# Patient Record
Sex: FEMALE | Race: WHITE | NOT HISPANIC OR LATINO | URBAN - METROPOLITAN AREA
[De-identification: names, ages, dates, MRNs, and addresses within clinical notes are randomized per-mention and may not be internally consistent; named-entity substitution may affect disease eponyms.]

---

## 2017-06-15 ENCOUNTER — INPATIENT HOSPITAL (AMBULATORY)
Dept: URBAN - METROPOLITAN AREA HOSPITAL 76 | Facility: HOSPITAL | Age: 47
End: 2017-06-15
Payer: OTHER GOVERNMENT

## 2017-06-15 DIAGNOSIS — K31.7 POLYP OF STOMACH AND DUODENUM: ICD-10-CM

## 2017-06-15 DIAGNOSIS — K62.5 HEMORRHAGE OF ANUS AND RECTUM: ICD-10-CM

## 2017-06-15 DIAGNOSIS — K64.0 FIRST DEGREE HEMORRHOIDS: ICD-10-CM

## 2017-06-15 DIAGNOSIS — R11.2 NAUSEA WITH VOMITING, UNSPECIFIED: ICD-10-CM

## 2017-06-15 DIAGNOSIS — R19.7 DIARRHEA, UNSPECIFIED: ICD-10-CM

## 2017-06-15 DIAGNOSIS — D12.2 BENIGN NEOPLASM OF ASCENDING COLON: ICD-10-CM

## 2017-06-15 DIAGNOSIS — K29.60 OTHER GASTRITIS WITHOUT BLEEDING: ICD-10-CM

## 2017-06-15 DIAGNOSIS — R10.31 RIGHT LOWER QUADRANT PAIN: ICD-10-CM

## 2017-06-15 PROCEDURE — 45380 COLONOSCOPY AND BIOPSY: CPT | Performed by: INTERNAL MEDICINE

## 2017-06-15 PROCEDURE — 43239 EGD BIOPSY SINGLE/MULTIPLE: CPT | Performed by: INTERNAL MEDICINE

## 2017-12-07 ENCOUNTER — HOSPITAL ENCOUNTER (OUTPATIENT)
Dept: OTHER | Facility: HOSPITAL | Age: 47
Discharge: HOME | End: 2017-12-07
Attending: ORTHOPAEDIC SURGERY | Admitting: ORTHOPAEDIC SURGERY

## 2017-12-07 LAB
ANION GAP SERPL CALC-SCNC: 15 MMOL/L (ref 10–20)
BASOPHILS # BLD AUTO: 0.1 10*3/UL (ref 0–0.2)
BASOPHILS NFR BLD AUTO: 1 % (ref 0–2)
BUN SERPL-MCNC: 31 MG/DL (ref 8–20)
BUN/CREAT SERPL: 15.5 (ref 5.4–26.2)
CALCIUM SERPL-MCNC: 9.9 MG/DL (ref 8.9–10.3)
CHLORIDE SERPL-SCNC: 109 MMOL/L (ref 101–111)
CONV CO2: 18 MMOL/L (ref 22–32)
CREAT UR-MCNC: 2 MG/DL (ref 0.4–1)
DIFFERENTIAL METHOD BLD: (no result)
EOSINOPHIL # BLD AUTO: 0.4 10*3/UL (ref 0–0.3)
EOSINOPHIL # BLD AUTO: 5 % (ref 0–3)
ERYTHROCYTE [DISTWIDTH] IN BLOOD BY AUTOMATED COUNT: 15.4 % (ref 11.5–14.5)
GLUCOSE SERPL-MCNC: 185 MG/DL (ref 65–99)
HCT VFR BLD AUTO: 37.6 % (ref 35–49)
HGB BLD-MCNC: 12.4 G/DL (ref 12–15)
LYMPHOCYTES # BLD AUTO: 2.6 10*3/UL (ref 0.8–4.8)
LYMPHOCYTES NFR BLD AUTO: 30 % (ref 18–42)
MCH RBC QN AUTO: 28.8 PG (ref 26–32)
MCHC RBC AUTO-ENTMCNC: 33 G/DL (ref 32–36)
MCV RBC AUTO: 87.5 FL (ref 80–94)
MONOCYTES # BLD AUTO: 0.7 10*3/UL (ref 0.1–1.3)
MONOCYTES NFR BLD AUTO: 8 % (ref 2–11)
NEUTROPHILS # BLD AUTO: 4.9 10*3/UL (ref 2.3–8.6)
NEUTROPHILS NFR BLD AUTO: 56 % (ref 50–75)
NRBC BLD AUTO-RTO: 0 /100{WBCS}
NRBC/RBC NFR BLD MANUAL: 0 10*3/UL
PLATELET # BLD AUTO: 151 10*3/UL (ref 150–450)
PMV BLD AUTO: 7.7 FL (ref 7.4–10.4)
POTASSIUM SERPL-SCNC: 5 MMOL/L (ref 3.6–5.1)
RBC # BLD AUTO: 4.3 10*6/UL (ref 4–5.4)
SODIUM SERPL-SCNC: 137 MMOL/L (ref 136–144)
WBC # BLD AUTO: 8.6 10*3/UL (ref 4.5–11.5)

## 2017-12-13 ENCOUNTER — OFFICE (AMBULATORY)
Dept: URBAN - METROPOLITAN AREA CLINIC 64 | Facility: CLINIC | Age: 47
End: 2017-12-13
Payer: OTHER GOVERNMENT

## 2017-12-13 VITALS
WEIGHT: 238 LBS | SYSTOLIC BLOOD PRESSURE: 109 MMHG | HEART RATE: 117 BPM | HEIGHT: 62 IN | DIASTOLIC BLOOD PRESSURE: 62 MMHG

## 2017-12-13 DIAGNOSIS — R10.84 GENERALIZED ABDOMINAL PAIN: ICD-10-CM

## 2017-12-13 DIAGNOSIS — K58.0 IRRITABLE BOWEL SYNDROME WITH DIARRHEA: ICD-10-CM

## 2017-12-13 PROCEDURE — 99214 OFFICE O/P EST MOD 30 MIN: CPT | Performed by: INTERNAL MEDICINE

## 2017-12-13 RX ORDER — COLESTIPOL HYDROCHLORIDE 1 G/1
TABLET, FILM COATED ORAL
Qty: 60 | Refills: 11 | Status: COMPLETED
Start: 2017-12-13 | End: 2019-04-23

## 2017-12-13 RX ORDER — DICYCLOMINE HYDROCHLORIDE 20 MG/1
TABLET ORAL
Qty: 120 | Refills: 12 | Status: COMPLETED
Start: 2017-12-13 | End: 2019-04-23

## 2017-12-29 ENCOUNTER — HOSPITAL ENCOUNTER (OUTPATIENT)
Dept: CARDIOLOGY | Facility: HOSPITAL | Age: 47
Discharge: HOME | End: 2017-12-29
Attending: ORTHOPAEDIC SURGERY | Admitting: ORTHOPAEDIC SURGERY

## 2018-04-06 ENCOUNTER — OFFICE (AMBULATORY)
Dept: URBAN - METROPOLITAN AREA CLINIC 64 | Facility: CLINIC | Age: 48
End: 2018-04-06
Payer: COMMERCIAL

## 2018-04-06 VITALS
SYSTOLIC BLOOD PRESSURE: 123 MMHG | WEIGHT: 228 LBS | HEART RATE: 90 BPM | DIASTOLIC BLOOD PRESSURE: 71 MMHG | HEIGHT: 62 IN

## 2018-04-06 DIAGNOSIS — K58.0 IRRITABLE BOWEL SYNDROME WITH DIARRHEA: ICD-10-CM

## 2018-04-06 DIAGNOSIS — R11.2 NAUSEA WITH VOMITING, UNSPECIFIED: ICD-10-CM

## 2018-04-06 PROCEDURE — 99212 OFFICE O/P EST SF 10 MIN: CPT | Performed by: INTERNAL MEDICINE

## 2018-11-26 ENCOUNTER — HOSPITAL ENCOUNTER (OUTPATIENT)
Dept: CARDIOLOGY | Facility: HOSPITAL | Age: 48
Discharge: HOME | End: 2018-11-26
Attending: INTERNAL MEDICINE | Admitting: INTERNAL MEDICINE

## 2018-12-11 ENCOUNTER — HOSPITAL ENCOUNTER (OUTPATIENT)
Dept: PREADMISSION TESTING | Facility: HOSPITAL | Age: 48
Discharge: HOME | End: 2018-12-11
Attending: INTERNAL MEDICINE | Admitting: INTERNAL MEDICINE

## 2018-12-11 LAB
ANION GAP SERPL CALC-SCNC: 13.7 MMOL/L (ref 10–20)
BASOPHILS # BLD AUTO: 0 10*3/UL (ref 0–0.2)
BASOPHILS NFR BLD AUTO: 1 % (ref 0–2)
BUN SERPL-MCNC: 20 MG/DL (ref 8–20)
BUN/CREAT SERPL: 13.3 (ref 5.4–26.2)
CALCIUM SERPL-MCNC: 10 MG/DL (ref 8.9–10.3)
CHLORIDE SERPL-SCNC: 104 MMOL/L (ref 101–111)
CONV CO2: 24 MMOL/L (ref 22–32)
CREAT UR-MCNC: 1.5 MG/DL (ref 0.4–1)
DIFFERENTIAL METHOD BLD: (no result)
EOSINOPHIL # BLD AUTO: 0.1 10*3/UL (ref 0–0.3)
EOSINOPHIL # BLD AUTO: 2 % (ref 0–3)
ERYTHROCYTE [DISTWIDTH] IN BLOOD BY AUTOMATED COUNT: 14.4 % (ref 11.5–14.5)
GLUCOSE SERPL-MCNC: 274 MG/DL (ref 65–99)
HCT VFR BLD AUTO: 39.3 % (ref 35–49)
HGB BLD-MCNC: 12.9 G/DL (ref 12–15)
INR PPP: 1
LYMPHOCYTES # BLD AUTO: 2.2 10*3/UL (ref 0.8–4.8)
LYMPHOCYTES NFR BLD AUTO: 34 % (ref 18–42)
MCH RBC QN AUTO: 27.5 PG (ref 26–32)
MCHC RBC AUTO-ENTMCNC: 32.9 G/DL (ref 32–36)
MCV RBC AUTO: 83.7 FL (ref 80–94)
MONOCYTES # BLD AUTO: 0.5 10*3/UL (ref 0.1–1.3)
MONOCYTES NFR BLD AUTO: 7 % (ref 2–11)
NEUTROPHILS # BLD AUTO: 3.6 10*3/UL (ref 2.3–8.6)
NEUTROPHILS NFR BLD AUTO: 56 % (ref 50–75)
NRBC BLD AUTO-RTO: 0 /100{WBCS}
NRBC/RBC NFR BLD MANUAL: 0 10*3/UL
PLATELET # BLD AUTO: 139 10*3/UL (ref 150–450)
PMV BLD AUTO: 7.7 FL (ref 7.4–10.4)
POTASSIUM SERPL-SCNC: 4.7 MMOL/L (ref 3.6–5.1)
PROTHROMBIN TIME: 9.9 SEC (ref 9.6–11.7)
RBC # BLD AUTO: 4.69 10*6/UL (ref 4–5.4)
SODIUM SERPL-SCNC: 137 MMOL/L (ref 136–144)
WBC # BLD AUTO: 6.5 10*3/UL (ref 4.5–11.5)

## 2019-04-23 ENCOUNTER — OFFICE (AMBULATORY)
Dept: URBAN - METROPOLITAN AREA CLINIC 64 | Facility: CLINIC | Age: 49
End: 2019-04-23
Payer: COMMERCIAL

## 2019-04-23 VITALS
WEIGHT: 233 LBS | DIASTOLIC BLOOD PRESSURE: 71 MMHG | HEART RATE: 81 BPM | HEIGHT: 62 IN | SYSTOLIC BLOOD PRESSURE: 135 MMHG

## 2019-04-23 DIAGNOSIS — E66.9 OBESITY, UNSPECIFIED: ICD-10-CM

## 2019-04-23 DIAGNOSIS — R07.89 OTHER CHEST PAIN: ICD-10-CM

## 2019-04-23 DIAGNOSIS — R13.10 DYSPHAGIA, UNSPECIFIED: ICD-10-CM

## 2019-04-23 PROCEDURE — 99214 OFFICE O/P EST MOD 30 MIN: CPT | Performed by: INTERNAL MEDICINE

## 2019-05-06 ENCOUNTER — OFFICE (AMBULATORY)
Dept: URBAN - METROPOLITAN AREA CLINIC 64 | Facility: CLINIC | Age: 49
End: 2019-05-06

## 2019-05-15 ENCOUNTER — HOSPITAL ENCOUNTER (OUTPATIENT)
Dept: CARDIOLOGY | Facility: HOSPITAL | Age: 49
Discharge: HOME | End: 2019-05-15
Attending: INTERNAL MEDICINE | Admitting: INTERNAL MEDICINE

## 2019-05-24 ENCOUNTER — OFFICE (AMBULATORY)
Dept: URBAN - METROPOLITAN AREA PATHOLOGY 4 | Facility: PATHOLOGY | Age: 49
End: 2019-05-24
Payer: COMMERCIAL

## 2019-05-24 ENCOUNTER — ON CAMPUS - OUTPATIENT (AMBULATORY)
Dept: URBAN - METROPOLITAN AREA HOSPITAL 2 | Facility: HOSPITAL | Age: 49
End: 2019-05-24
Payer: COMMERCIAL

## 2019-05-24 VITALS
OXYGEN SATURATION: 90 % | DIASTOLIC BLOOD PRESSURE: 59 MMHG | OXYGEN SATURATION: 97 % | SYSTOLIC BLOOD PRESSURE: 106 MMHG | HEART RATE: 87 BPM | HEART RATE: 77 BPM | RESPIRATION RATE: 20 BRPM | SYSTOLIC BLOOD PRESSURE: 116 MMHG | DIASTOLIC BLOOD PRESSURE: 61 MMHG | HEART RATE: 85 BPM | DIASTOLIC BLOOD PRESSURE: 44 MMHG | SYSTOLIC BLOOD PRESSURE: 122 MMHG | RESPIRATION RATE: 21 BRPM | DIASTOLIC BLOOD PRESSURE: 69 MMHG | DIASTOLIC BLOOD PRESSURE: 68 MMHG | DIASTOLIC BLOOD PRESSURE: 78 MMHG | SYSTOLIC BLOOD PRESSURE: 109 MMHG | HEIGHT: 62 IN | SYSTOLIC BLOOD PRESSURE: 119 MMHG | OXYGEN SATURATION: 93 % | RESPIRATION RATE: 17 BRPM | RESPIRATION RATE: 18 BRPM | RESPIRATION RATE: 16 BRPM | DIASTOLIC BLOOD PRESSURE: 67 MMHG | SYSTOLIC BLOOD PRESSURE: 112 MMHG | HEART RATE: 86 BPM | WEIGHT: 234 LBS | SYSTOLIC BLOOD PRESSURE: 90 MMHG | HEART RATE: 80 BPM | OXYGEN SATURATION: 95 % | HEART RATE: 79 BPM | TEMPERATURE: 97.7 F | HEART RATE: 98 BPM | HEART RATE: 97 BPM | OXYGEN SATURATION: 96 %

## 2019-05-24 DIAGNOSIS — R13.10 DYSPHAGIA, UNSPECIFIED: ICD-10-CM

## 2019-05-24 DIAGNOSIS — K31.7 POLYP OF STOMACH AND DUODENUM: ICD-10-CM

## 2019-05-24 DIAGNOSIS — T18.2XXA FOREIGN BODY IN STOMACH, INITIAL ENCOUNTER: ICD-10-CM

## 2019-05-24 DIAGNOSIS — R07.89 OTHER CHEST PAIN: ICD-10-CM

## 2019-05-24 LAB
GI HISTOLOGY: A. ANTRUM: (no result)
GI HISTOLOGY: PDF REPORT: (no result)

## 2019-05-24 PROCEDURE — 88305 TISSUE EXAM BY PATHOLOGIST: CPT | Performed by: INTERNAL MEDICINE

## 2019-05-24 PROCEDURE — 43450 DILATE ESOPHAGUS 1/MULT PASS: CPT | Performed by: INTERNAL MEDICINE

## 2019-05-24 PROCEDURE — 43251 EGD REMOVE LESION SNARE: CPT | Performed by: INTERNAL MEDICINE

## 2019-10-21 RX ORDER — ASPIRIN 81 MG/1
TABLET ORAL EVERY 24 HOURS
COMMUNITY
Start: 2018-10-23 | End: 2020-11-19 | Stop reason: ALTCHOICE

## 2019-10-21 RX ORDER — PROPRANOLOL HYDROCHLORIDE 60 MG/1
80 TABLET ORAL EVERY 24 HOURS
COMMUNITY
Start: 2018-10-23 | End: 2022-09-30

## 2019-10-21 RX ORDER — TOPIRAMATE 50 MG/1
TABLET, FILM COATED ORAL EVERY 12 HOURS
COMMUNITY
Start: 2018-10-23 | End: 2021-08-23 | Stop reason: ALTCHOICE

## 2019-10-21 RX ORDER — TIZANIDINE HYDROCHLORIDE 4 MG/1
4 CAPSULE, GELATIN COATED ORAL 3 TIMES DAILY PRN
COMMUNITY
Start: 2018-10-23

## 2019-10-21 RX ORDER — SERTRALINE HYDROCHLORIDE 100 MG/1
100 TABLET, FILM COATED ORAL 2 TIMES DAILY
COMMUNITY
Start: 2012-10-15

## 2019-10-21 RX ORDER — SIMVASTATIN 40 MG
80 TABLET ORAL NIGHTLY
COMMUNITY
Start: 2016-04-29

## 2019-10-21 RX ORDER — ALBUTEROL SULFATE 90 UG/1
2 AEROSOL, METERED RESPIRATORY (INHALATION) EVERY 4 HOURS PRN
COMMUNITY
Start: 2012-10-15

## 2019-10-21 RX ORDER — LISINOPRIL AND HYDROCHLOROTHIAZIDE 25; 20 MG/1; MG/1
TABLET ORAL EVERY 24 HOURS
COMMUNITY
Start: 2018-10-23 | End: 2020-10-01 | Stop reason: ALTCHOICE

## 2019-10-21 RX ORDER — BUSPIRONE HYDROCHLORIDE 10 MG/1
TABLET ORAL EVERY 12 HOURS
COMMUNITY
Start: 2018-10-23 | End: 2019-10-24

## 2019-10-24 ENCOUNTER — OFFICE VISIT (OUTPATIENT)
Dept: CARDIOLOGY | Facility: CLINIC | Age: 49
End: 2019-10-24

## 2019-10-24 VITALS
HEART RATE: 82 BPM | BODY MASS INDEX: 41.77 KG/M2 | DIASTOLIC BLOOD PRESSURE: 75 MMHG | HEIGHT: 62 IN | OXYGEN SATURATION: 97 % | WEIGHT: 227 LBS | SYSTOLIC BLOOD PRESSURE: 119 MMHG

## 2019-10-24 DIAGNOSIS — I10 ESSENTIAL HYPERTENSION: ICD-10-CM

## 2019-10-24 DIAGNOSIS — I25.10 CORONARY ARTERY DISEASE INVOLVING NATIVE CORONARY ARTERY OF NATIVE HEART WITHOUT ANGINA PECTORIS: ICD-10-CM

## 2019-10-24 DIAGNOSIS — E11.65 TYPE 2 DIABETES MELLITUS WITH HYPERGLYCEMIA, WITHOUT LONG-TERM CURRENT USE OF INSULIN (HCC): ICD-10-CM

## 2019-10-24 DIAGNOSIS — I73.9 PVD (PERIPHERAL VASCULAR DISEASE) WITH CLAUDICATION (HCC): Primary | ICD-10-CM

## 2019-10-24 DIAGNOSIS — E78.5 DYSLIPIDEMIA: ICD-10-CM

## 2019-10-24 PROCEDURE — 93000 ELECTROCARDIOGRAM COMPLETE: CPT | Performed by: INTERNAL MEDICINE

## 2019-10-24 PROCEDURE — 99214 OFFICE O/P EST MOD 30 MIN: CPT | Performed by: INTERNAL MEDICINE

## 2019-10-24 RX ORDER — PANTOPRAZOLE SODIUM 40 MG/1
40 TABLET, DELAYED RELEASE ORAL DAILY
Refills: 5 | COMMUNITY
Start: 2019-10-18 | End: 2023-01-16

## 2019-10-24 RX ORDER — HYDROCODONE BITARTRATE AND ACETAMINOPHEN 10; 325 MG/1; MG/1
1 TABLET ORAL EVERY 8 HOURS PRN
COMMUNITY

## 2019-10-24 RX ORDER — GABAPENTIN 300 MG/1
600 CAPSULE ORAL 3 TIMES DAILY
Refills: 5 | COMMUNITY
Start: 2019-08-10 | End: 2022-05-31

## 2019-10-24 RX ORDER — SUCRALFATE 1 G/1
TABLET ORAL
Refills: 0 | COMMUNITY
Start: 2019-07-22 | End: 2021-08-23 | Stop reason: ALTCHOICE

## 2019-10-24 RX ORDER — DULAGLUTIDE 1.5 MG/.5ML
INJECTION, SOLUTION SUBCUTANEOUS
Refills: 11 | COMMUNITY
Start: 2019-10-14 | End: 2021-08-23 | Stop reason: ALTCHOICE

## 2019-10-24 RX ORDER — VENLAFAXINE HYDROCHLORIDE 75 MG/1
CAPSULE, EXTENDED RELEASE ORAL
COMMUNITY
End: 2021-08-23 | Stop reason: ALTCHOICE

## 2019-10-24 RX ORDER — FENOFIBRATE 145 MG/1
145 TABLET, COATED ORAL DAILY
COMMUNITY

## 2019-10-24 NOTE — PROGRESS NOTES
Subjective:     Encounter Date:10/24/2019      Patient ID: Gissel Amaro is a 49 y.o. female.    Chief Complaint: Follow-up for hypertensive cardiovascular disease with CHF, hyperlipidemia, and diabetes  History of Present Illness See below      Past Medical History:  Past Medical History:   Diagnosis Date   • Asthma    • CAD (coronary artery disease)    • Depression    • Diabetes (CMS/HCC)    • MAX (dyspnea on exertion)    • HTN (hypertension)    • Hyperlipemia      Past Surgical History:  Past Surgical History:   Procedure Laterality Date   • CARDIAC CATHETERIZATION     • GALLBLADDER SURGERY     • HYSTERECTOMY     • KNEE SURGERY        Allergies:  Allergies   Allergen Reactions   • Penicillins Unknown (See Comments)     Home Meds:  Current Meds:     Current Outpatient Medications:   •  albuterol sulfate HFA (VENTOLIN HFA) 108 (90 Base) MCG/ACT inhaler, VENTOLIN  (90 Base) MCG/ACT AERS, Disp: , Rfl:   •  aspirin (ASPIR-LOW) 81 MG EC tablet, Daily., Disp: , Rfl:   •  fenofibrate (TRICOR) 145 MG tablet, fenofibrate nanocrystallized 145 mg tablet  Take 1 tablet every day by oral route., Disp: , Rfl:   •  gabapentin (NEURONTIN) 300 MG capsule, TK 1 C PO TID, Disp: , Rfl: 5  •  HYDROcodone-acetaminophen (NORCO)  MG per tablet, hydrocodone 10 mg-acetaminophen 325 mg tablet, Disp: , Rfl:   •  insulin lispro (HUMALOG) 100 UNIT/ML injection, HUMALOG 100 UNIT/ML SOLN, Disp: , Rfl:   •  lisinopril-hydrochlorothiazide (PRINZIDE,ZESTORETIC) 20-25 MG per tablet, Daily., Disp: , Rfl:   •  Magnesium Oxide -Mg Supplement 400 MG capsule, MAGNESIUM 400 MG CAPS, Disp: , Rfl:   •  pantoprazole (PROTONIX) 40 MG EC tablet, Take  by mouth Daily., Disp: , Rfl: 5  •  propranolol (INDERAL) 60 MG tablet, Daily., Disp: , Rfl:   •  sertraline (ZOLOFT) 100 MG tablet, SERTRALINE  MG TABS, Disp: , Rfl:   •  simvastatin (ZOCOR) 40 MG tablet, SIMVASTATIN 40 MG TABS, Disp: , Rfl:   •  TiZANidine (ZANAFLEX) 4 MG capsule,  "TIZANIDINE HCL 4 MG CAPS, Disp: , Rfl:   •  topiramate (TOPAMAX) 50 MG tablet, Every 12 (Twelve) Hours., Disp: , Rfl:   •  TRULICITY 1.5 MG/0.5ML solution pen-injector, INJ 0.5 ML SC Q WEEK, Disp: , Rfl: 11  •  venlafaxine XR (EFFEXOR-XR) 75 MG 24 hr capsule, venlafaxine ER 75 mg capsule,extended release 24 hr  Take 1 capsule every day by oral route., Disp: , Rfl:   •  sucralfate (CARAFATE) 1 g tablet, DISSOLVE 1 TABLET IN WATER AND DRINK QID UTD, Disp: , Rfl: 0  Social History:   Social History     Tobacco Use   • Smoking status: Never Smoker   • Smokeless tobacco: Never Used   Substance Use Topics   • Alcohol use: Not on file      Family History:  Family History   Problem Relation Age of Onset   • Heart disease Father         The following portions of the patient's history were reviewed and updated as appropriate: allergies, current medications, past family history, past medical history, past social history, past surgical history and problem list.    Review of Systems   Cardiovascular: Positive for palpitations. Negative for chest pain and leg swelling.   Respiratory: Negative for shortness of breath.    Neurological: Negative for dizziness and numbness.         ECG 12 Lead  Date/Time: 10/24/2019 1:47 PM  Performed by: Chris Ng MD  Authorized by: Chris Ng MD   Comparison: not compared with previous ECG   Comments: EKG done today reviewed by me shows sinus rhythm at the rate of 79 bpm with low QRS voltage in precordial leads and small inferior Q waves, no comparison EKG available               Objective:     Physical Exam  /75 (BP Location: Left arm, Patient Position: Sitting, Cuff Size: Large Adult)   Pulse 82   Ht 157.5 cm (62\")   Wt 103 kg (227 lb)   SpO2 97%   BMI 41.52 kg/m²   General:  Appears in no acute distress  Eyes: Sclera is anicteric,  conjunctiva is clear   HEENT:  No JVD. Thyroid not visibly enlarged. No mucosal pallor or cyanosis  Respiratory: " Respirations regular and unlabored at rest.  Bilaterally good breath sounds, with good air entry in all fields. No crackles, rubs or wheezes auscultated  Cardiovascular: S1,S2 Regular rate and rhythm. No murmur, rub or gallop auscultated. No pretibial pitting edema  Gastrointestinal: Abdomen soft, flat, non tender. Bowel sounds present.   Musculoskeletal:  No abnormal movements  Extremities: No digital clubbing or cyanosis  Skin: Color pink. Skin warm and dry to touch. No rashes  No xanthoma  Neuro: Alert and awake, no lateralizing deficits appreciated    Lab Review:       Assessment:         No diagnosis found.    CC:  followup for CAD, hypertensive cardiovascular disease with elevated LVEDP, hyperlipidemia, diabetes    History of Present Illness:     This is a 49-year-old with PMH of  # CAD, cath 12/12/18 small-vessel disease in distal LAD with elevated LVEDP:  LVEF of 50%  #  type 2 diabetes  #  hypertension, hyperlipidemia  #  history of DVT was on anticoagulation till 06/2012  #  history of pneumonia,  asthma,MRSA, migraines, depression  #  cholecystectomy, hysterectomy, MRSA on the back  #  right knee surgery  #  allergy/intolerance to penicillin     here for f/u.  Patient is complaining of intermittent claudication most of the time at nighttime while lying down.  Denies any chest pain shortness of breath palpitations lightheadedness dizziness loss of consciousness.  Patient reportedly had labs with PMD last month and her sugars were out of control with A1c of 12 which she is working on right now.   review of workup reveals normal EKG, negative venous Dopplers 12/29/2017, chest x-ray from 12/07/2017 revealed minimal scarring left base. last labs from 2016 in April reveal a hemoglobin A1c of 10.4 cholesterol 327 triglycerides 780 HDL low at 38    patient's arterial blood pressure is 119/75, heart rate 82, O2 sat of 97% on room air  .     ASSESSMENT:  #Claudication  #  hypertensive cardiovascular disease  with elevated LVEDP  # CAD  #  diabetes, hyperlipidemia, positive family history, obesity    PLAN:  We will check DAPHNIE  We will check electrolytes to make sure patient is not getting the cramps due to electrolyte abnormalities.  Patient said she had electrolytes done with PMD will get the results   Will continue medical management with aspirin lisinopril hydrochlorothiazide and propranolol risk benefits alternatives except   counseled on weight loss diet and exercise   Will get labs done in PMD is office   advice patient to call if  symptoms r worsening  Reviewed EKG results with patient         Plan:

## 2019-10-31 ENCOUNTER — HOSPITAL ENCOUNTER (OUTPATIENT)
Dept: CARDIOLOGY | Facility: HOSPITAL | Age: 49
Discharge: HOME | End: 2019-10-31
Admitting: INTERNAL MEDICINE

## 2019-10-31 ENCOUNTER — LAB (OUTPATIENT)
Dept: LAB | Facility: HOSPITAL | Age: 49
End: 2019-10-31

## 2019-10-31 ENCOUNTER — TELEPHONE (OUTPATIENT)
Dept: CARDIOLOGY | Facility: CLINIC | Age: 49
End: 2019-10-31

## 2019-10-31 ENCOUNTER — TRANSCRIBE ORDERS (OUTPATIENT)
Dept: ADMINISTRATIVE | Facility: HOSPITAL | Age: 49
End: 2019-10-31

## 2019-10-31 DIAGNOSIS — I10 ESSENTIAL HYPERTENSION: ICD-10-CM

## 2019-10-31 DIAGNOSIS — I73.9 PVD (PERIPHERAL VASCULAR DISEASE) WITH CLAUDICATION (HCC): ICD-10-CM

## 2019-10-31 DIAGNOSIS — B35.1 NAIL FUNGAL INFECTION: ICD-10-CM

## 2019-10-31 DIAGNOSIS — B35.1 NAIL FUNGAL INFECTION: Primary | ICD-10-CM

## 2019-10-31 DIAGNOSIS — E78.5 DYSLIPIDEMIA: ICD-10-CM

## 2019-10-31 DIAGNOSIS — I25.10 CORONARY ARTERY DISEASE INVOLVING NATIVE CORONARY ARTERY OF NATIVE HEART WITHOUT ANGINA PECTORIS: ICD-10-CM

## 2019-10-31 DIAGNOSIS — E11.65 TYPE 2 DIABETES MELLITUS WITH HYPERGLYCEMIA, WITHOUT LONG-TERM CURRENT USE OF INSULIN (HCC): ICD-10-CM

## 2019-10-31 LAB
BH CV LOWER ARTERIAL LEFT ABI RATIO: 1.24
BH CV LOWER ARTERIAL LEFT DORSALIS PEDIS SYS MAX: 105 MMHG
BH CV LOWER ARTERIAL LEFT GREAT TOE SYS MAX: 90 MMHG
BH CV LOWER ARTERIAL LEFT POST TIBIAL SYS MAX: 109 MMHG
BH CV LOWER ARTERIAL LEFT TBI RATIO: 1.02
BH CV LOWER ARTERIAL RIGHT ABI RATIO: 1.19
BH CV LOWER ARTERIAL RIGHT DORSALIS PEDIS SYS MAX: 105 MMHG
BH CV LOWER ARTERIAL RIGHT GREAT TOE SYS MAX: 95 MMHG
BH CV LOWER ARTERIAL RIGHT POST TIBIAL SYS MAX: 105 MMHG
BH CV LOWER ARTERIAL RIGHT TBI RATIO: 1.08
UPPER ARTERIAL LEFT ARM BRACHIAL SYS MAX: 88 MMHG
UPPER ARTERIAL RIGHT ARM BRACHIAL SYS MAX: 80 MMHG

## 2019-10-31 PROCEDURE — 93922 UPR/L XTREMITY ART 2 LEVELS: CPT

## 2019-12-23 ENCOUNTER — OFFICE (AMBULATORY)
Dept: URBAN - METROPOLITAN AREA CLINIC 64 | Facility: CLINIC | Age: 49
End: 2019-12-23
Payer: OTHER GOVERNMENT

## 2019-12-23 VITALS
HEIGHT: 62 IN | WEIGHT: 224 LBS | DIASTOLIC BLOOD PRESSURE: 81 MMHG | SYSTOLIC BLOOD PRESSURE: 132 MMHG | HEART RATE: 87 BPM

## 2019-12-23 DIAGNOSIS — I10 ESSENTIAL (PRIMARY) HYPERTENSION: ICD-10-CM

## 2019-12-23 DIAGNOSIS — Z90.49 ACQUIRED ABSENCE OF OTHER SPECIFIED PARTS OF DIGESTIVE TRACT: ICD-10-CM

## 2019-12-23 DIAGNOSIS — R11.2 NAUSEA WITH VOMITING, UNSPECIFIED: ICD-10-CM

## 2019-12-23 DIAGNOSIS — K85.90 ACUTE PANCREATITIS WITHOUT NECROSIS OR INFECTION, UNSPECIFIE: ICD-10-CM

## 2019-12-23 PROCEDURE — 99214 OFFICE O/P EST MOD 30 MIN: CPT | Performed by: NURSE PRACTITIONER

## 2020-02-04 ENCOUNTER — ON CAMPUS - OUTPATIENT (AMBULATORY)
Dept: URBAN - METROPOLITAN AREA HOSPITAL 77 | Facility: HOSPITAL | Age: 50
End: 2020-02-04
Payer: COMMERCIAL

## 2020-02-04 DIAGNOSIS — K29.60 OTHER GASTRITIS WITHOUT BLEEDING: ICD-10-CM

## 2020-02-04 DIAGNOSIS — T18.2XXA FOREIGN BODY IN STOMACH, INITIAL ENCOUNTER: ICD-10-CM

## 2020-02-04 DIAGNOSIS — K44.9 DIAPHRAGMATIC HERNIA WITHOUT OBSTRUCTION OR GANGRENE: ICD-10-CM

## 2020-02-04 DIAGNOSIS — K85.90 ACUTE PANCREATITIS WITHOUT NECROSIS OR INFECTION, UNSPECIFIE: ICD-10-CM

## 2020-02-04 PROCEDURE — 43238 EGD US FINE NEEDLE BX/ASPIR: CPT | Performed by: INTERNAL MEDICINE

## 2020-02-13 ENCOUNTER — OFFICE (AMBULATORY)
Dept: URBAN - METROPOLITAN AREA CLINIC 64 | Facility: CLINIC | Age: 50
End: 2020-02-13

## 2020-02-13 VITALS
DIASTOLIC BLOOD PRESSURE: 74 MMHG | HEART RATE: 70 BPM | SYSTOLIC BLOOD PRESSURE: 128 MMHG | WEIGHT: 234 LBS | HEIGHT: 62 IN

## 2020-02-13 DIAGNOSIS — K21.9 GASTRO-ESOPHAGEAL REFLUX DISEASE WITHOUT ESOPHAGITIS: ICD-10-CM

## 2020-02-13 DIAGNOSIS — E66.9 OBESITY, UNSPECIFIED: ICD-10-CM

## 2020-02-13 DIAGNOSIS — R10.13 EPIGASTRIC PAIN: ICD-10-CM

## 2020-02-13 DIAGNOSIS — E11.9 TYPE 2 DIABETES MELLITUS WITHOUT COMPLICATIONS: ICD-10-CM

## 2020-02-13 DIAGNOSIS — K85.90 ACUTE PANCREATITIS WITHOUT NECROSIS OR INFECTION, UNSPECIFIE: ICD-10-CM

## 2020-02-13 PROCEDURE — 99214 OFFICE O/P EST MOD 30 MIN: CPT | Performed by: INTERNAL MEDICINE

## 2020-02-13 RX ORDER — METOCLOPRAMIDE 10 MG/1
TABLET ORAL
Qty: 60 | Refills: 1 | Status: COMPLETED
Start: 2020-02-04 | End: 2020-12-03

## 2020-06-10 ENCOUNTER — OFFICE (AMBULATORY)
Dept: URBAN - METROPOLITAN AREA CLINIC 64 | Facility: CLINIC | Age: 50
End: 2020-06-10
Payer: COMMERCIAL

## 2020-06-10 VITALS
DIASTOLIC BLOOD PRESSURE: 84 MMHG | HEART RATE: 97 BPM | SYSTOLIC BLOOD PRESSURE: 154 MMHG | HEIGHT: 62 IN | WEIGHT: 246 LBS

## 2020-06-10 DIAGNOSIS — R13.10 DYSPHAGIA, UNSPECIFIED: ICD-10-CM

## 2020-06-10 DIAGNOSIS — R10.13 EPIGASTRIC PAIN: ICD-10-CM

## 2020-06-10 DIAGNOSIS — R11.2 NAUSEA WITH VOMITING, UNSPECIFIED: ICD-10-CM

## 2020-06-10 DIAGNOSIS — Z86.010 PERSONAL HISTORY OF COLONIC POLYPS: ICD-10-CM

## 2020-06-10 DIAGNOSIS — K86.1 OTHER CHRONIC PANCREATITIS: ICD-10-CM

## 2020-06-10 PROCEDURE — 99214 OFFICE O/P EST MOD 30 MIN: CPT | Performed by: NURSE PRACTITIONER

## 2020-07-01 ENCOUNTER — ON CAMPUS - OUTPATIENT (AMBULATORY)
Dept: URBAN - METROPOLITAN AREA HOSPITAL 77 | Facility: HOSPITAL | Age: 50
End: 2020-07-01
Payer: COMMERCIAL

## 2020-07-01 DIAGNOSIS — R10.13 EPIGASTRIC PAIN: ICD-10-CM

## 2020-07-01 DIAGNOSIS — K31.7 POLYP OF STOMACH AND DUODENUM: ICD-10-CM

## 2020-07-01 DIAGNOSIS — R11.2 NAUSEA WITH VOMITING, UNSPECIFIED: ICD-10-CM

## 2020-07-01 DIAGNOSIS — R13.10 DYSPHAGIA, UNSPECIFIED: ICD-10-CM

## 2020-07-01 PROCEDURE — 43450 DILATE ESOPHAGUS 1/MULT PASS: CPT | Performed by: INTERNAL MEDICINE

## 2020-07-01 PROCEDURE — 43239 EGD BIOPSY SINGLE/MULTIPLE: CPT | Performed by: INTERNAL MEDICINE

## 2020-09-08 ENCOUNTER — OUTSIDE FACILITY SERVICE (OUTPATIENT)
Dept: CARDIOLOGY | Facility: CLINIC | Age: 50
End: 2020-09-08

## 2020-09-08 PROCEDURE — 99222 1ST HOSP IP/OBS MODERATE 55: CPT | Performed by: INTERNAL MEDICINE

## 2020-09-09 PROCEDURE — 99232 SBSQ HOSP IP/OBS MODERATE 35: CPT | Performed by: INTERNAL MEDICINE

## 2020-09-10 ENCOUNTER — OUTSIDE FACILITY SERVICE (OUTPATIENT)
Dept: CARDIOLOGY | Facility: CLINIC | Age: 50
End: 2020-09-10

## 2020-09-10 PROCEDURE — 99232 SBSQ HOSP IP/OBS MODERATE 35: CPT | Performed by: INTERNAL MEDICINE

## 2020-10-01 ENCOUNTER — OFFICE VISIT (OUTPATIENT)
Dept: CARDIOLOGY | Facility: CLINIC | Age: 50
End: 2020-10-01

## 2020-10-01 VITALS
OXYGEN SATURATION: 96 % | WEIGHT: 252 LBS | HEART RATE: 97 BPM | BODY MASS INDEX: 46.09 KG/M2 | SYSTOLIC BLOOD PRESSURE: 144 MMHG | DIASTOLIC BLOOD PRESSURE: 82 MMHG

## 2020-10-01 DIAGNOSIS — I50.30 DIASTOLIC CONGESTIVE HEART FAILURE, UNSPECIFIED HF CHRONICITY (HCC): ICD-10-CM

## 2020-10-01 DIAGNOSIS — I25.10 CHRONIC CORONARY ARTERY DISEASE: ICD-10-CM

## 2020-10-01 DIAGNOSIS — E66.01 CLASS 3 SEVERE OBESITY DUE TO EXCESS CALORIES WITH SERIOUS COMORBIDITY AND BODY MASS INDEX (BMI) OF 45.0 TO 49.9 IN ADULT (HCC): ICD-10-CM

## 2020-10-01 DIAGNOSIS — E78.2 MIXED HYPERLIPIDEMIA: ICD-10-CM

## 2020-10-01 DIAGNOSIS — G47.33 OBSTRUCTIVE SLEEP APNEA SYNDROME: ICD-10-CM

## 2020-10-01 DIAGNOSIS — I10 BENIGN HYPERTENSION: Primary | ICD-10-CM

## 2020-10-01 DIAGNOSIS — R60.0 LOCALIZED EDEMA: ICD-10-CM

## 2020-10-01 PROBLEM — R60.9 EDEMA: Status: ACTIVE | Noted: 2019-05-02

## 2020-10-01 PROCEDURE — 99214 OFFICE O/P EST MOD 30 MIN: CPT | Performed by: NURSE PRACTITIONER

## 2020-10-01 RX ORDER — POTASSIUM CHLORIDE 750 MG/1
10 CAPSULE, EXTENDED RELEASE ORAL DAILY PRN
COMMUNITY
End: 2022-11-21

## 2020-10-01 RX ORDER — INSULIN HUMAN 500 [IU]/ML
120 INJECTION, SOLUTION SUBCUTANEOUS
COMMUNITY
End: 2022-10-20 | Stop reason: HOSPADM

## 2020-10-01 RX ORDER — FUROSEMIDE 20 MG/1
40 TABLET ORAL DAILY
COMMUNITY
End: 2022-09-30

## 2020-10-01 RX ORDER — ROPINIROLE 2 MG/1
2 TABLET, FILM COATED ORAL NIGHTLY
COMMUNITY

## 2020-10-08 ENCOUNTER — TELEPHONE (OUTPATIENT)
Dept: CARDIOLOGY | Facility: CLINIC | Age: 50
End: 2020-10-08

## 2020-11-19 ENCOUNTER — OFFICE VISIT (OUTPATIENT)
Dept: CARDIOLOGY | Facility: CLINIC | Age: 50
End: 2020-11-19

## 2020-11-19 VITALS
HEIGHT: 62 IN | SYSTOLIC BLOOD PRESSURE: 114 MMHG | HEART RATE: 77 BPM | WEIGHT: 245 LBS | BODY MASS INDEX: 45.08 KG/M2 | DIASTOLIC BLOOD PRESSURE: 74 MMHG

## 2020-11-19 DIAGNOSIS — E78.2 MIXED HYPERLIPIDEMIA: ICD-10-CM

## 2020-11-19 DIAGNOSIS — I25.10 CHRONIC CORONARY ARTERY DISEASE: ICD-10-CM

## 2020-11-19 DIAGNOSIS — E11.9 TYPE 2 DIABETES MELLITUS WITHOUT COMPLICATION, WITHOUT LONG-TERM CURRENT USE OF INSULIN (HCC): ICD-10-CM

## 2020-11-19 DIAGNOSIS — R60.0 LOCALIZED EDEMA: ICD-10-CM

## 2020-11-19 DIAGNOSIS — G47.33 OBSTRUCTIVE SLEEP APNEA SYNDROME: ICD-10-CM

## 2020-11-19 DIAGNOSIS — I10 BENIGN HYPERTENSION: Primary | ICD-10-CM

## 2020-11-19 PROCEDURE — 99442 PR PHYS/QHP TELEPHONE EVALUATION 11-20 MIN: CPT | Performed by: NURSE PRACTITIONER

## 2020-11-19 NOTE — PROGRESS NOTES
"  Encounter Date:  11/19/2020    Patient ID:   Gissel Amaro is a 50 y.o. female.    Reason For Followup:  Small vessel coronary artery disease  Mild LV dysfunction  Hypertension with hypertensive cardiovascular disease    Brief Clinical History:  Dear Dr. Cronin, John Tomlin MD (Inactive)    I had the pleasure of performing a telephone visit with Gissel Amaro today. As you are well aware, this is a 50 y.o. female with known history of small vessel coronary artery disease per heart catheterization performed 12/12/2018 in which she was found to have small vessel disease in the distal LAD with elevated LVEDP.  Left ventricular ejection fraction low normal at 50%.  She has additional history that includes diabetes mellitus 2, hypertension with hypertensive cardiovascular disease, dyslipidemia, history of lower extremity DVT on anticoagulation prior, history of heart failure with preserved ejection fraction.  History of asthma and pneumonia.  The patient was admitted to [facility] recently for 5 days for shortness of breath.  2D echocardiogram was performed that showed normal LV systolic function with EF 60-65%.  Trace TR/AR.  Normal pulmonary artery pressure.  Lisinopril was discontinued secondary to kidney disease.  Recent ABIs normal, chest x-ray at 9/12/2020 with atelectasis.  VQ scan 9/8/2020 with no evidence of PE.       Interval History:   Patient reports she did contract COVID-19 but fortunately was able to remain at home.  She still feels somewhat tired and weak from that illness.  She does check her pressures at home which she states are running 120 systolic and 70-80 diastolic.  Her respiratory status is \"okay\".  She denies any lower extremity edema.  She is monitoring her fluids much more closely with improvement in edema.          Assessment & Plan    Impressions:  Coronary artery disease-nonocclusive  History of heart failure with preserved ejection fraction  Hypertension with " "hypertensive cardiovascular disease  Dyslipidemia  History of lower extremity DVT  Morbid obesity  Trivial valvular heart disease  Recent COVID-19 illness    Recommendations:  Continue current medical regimen  Continue to monitor fluid/sodium intake  Follow-up in 3 months or sooner if needed    Vitals:  Vitals:    11/19/20 1450   BP: 114/74   Pulse: 77   Weight: 111 kg (245 lb)   Height: 157.5 cm (62\")       Allergies:  Allergies   Allergen Reactions   • Penicillins Unknown (See Comments)       Medication Review:     Current Outpatient Medications:   •  albuterol sulfate HFA (VENTOLIN HFA) 108 (90 Base) MCG/ACT inhaler, VENTOLIN  (90 Base) MCG/ACT AERS, Disp: , Rfl:   •  Cariprazine HCl (Vraylar) 1.5 MG capsule capsule, Take 1.5 mg by mouth Daily., Disp: , Rfl:   •  fenofibrate (TRICOR) 145 MG tablet, fenofibrate nanocrystallized 145 mg tablet  Take 1 tablet every day by oral route., Disp: , Rfl:   •  furosemide (LASIX) 20 MG tablet, Take 20 mg by mouth Daily., Disp: , Rfl:   •  gabapentin (NEURONTIN) 300 MG capsule, TK 1 C PO TID, Disp: , Rfl: 5  •  HYDROcodone-acetaminophen (NORCO)  MG per tablet, hydrocodone 10 mg-acetaminophen 325 mg tablet, Disp: , Rfl:   •  insulin lispro (HUMALOG) 100 UNIT/ML injection, HUMALOG 100 UNIT/ML SOLN, Disp: , Rfl:   •  insulin regular (HUMULIN R) 500 UNIT/ML CONCENTRATED injection, Inject 150 Units under the skin into the appropriate area as directed 2 (two) times a day., Disp: , Rfl:   •  Magnesium Oxide -Mg Supplement 400 MG capsule, MAGNESIUM 400 MG CAPS, Disp: , Rfl:   •  pantoprazole (PROTONIX) 40 MG EC tablet, Take  by mouth Daily., Disp: , Rfl: 5  •  potassium chloride (MICRO-K) 10 MEQ CR capsule, Take 10 mEq by mouth Daily., Disp: , Rfl:   •  propranolol (INDERAL) 60 MG tablet, Daily., Disp: , Rfl:   •  rOPINIRole (REQUIP) 2 MG tablet, Take 2 mg by mouth Every Night., Disp: , Rfl:   •  sertraline (ZOLOFT) 100 MG tablet, SERTRALINE  MG TABS, Disp: , Rfl: "   •  simvastatin (ZOCOR) 40 MG tablet, SIMVASTATIN 40 MG TABS, Disp: , Rfl:   •  sucralfate (CARAFATE) 1 g tablet, DISSOLVE 1 TABLET IN WATER AND DRINK QID UTD, Disp: , Rfl: 0  •  TiZANidine (ZANAFLEX) 4 MG capsule, TIZANIDINE HCL 4 MG CAPS, Disp: , Rfl:   •  topiramate (TOPAMAX) 50 MG tablet, Every 12 (Twelve) Hours., Disp: , Rfl:   •  TRULICITY 1.5 MG/0.5ML solution pen-injector, INJ 0.5 ML SC Q WEEK, Disp: , Rfl: 11  •  venlafaxine XR (EFFEXOR-XR) 75 MG 24 hr capsule, venlafaxine ER 75 mg capsule,extended release 24 hr  Take 1 capsule every day by oral route., Disp: , Rfl:     Family History:  Family History   Problem Relation Age of Onset   • Heart disease Father        Past Medical History:  Past Medical History:   Diagnosis Date   • Asthma    • CAD (coronary artery disease)    • Depression    • Diabetes (CMS/HCC)    • MAX (dyspnea on exertion)    • HTN (hypertension)    • Hyperlipemia        Past surgical History:  Past Surgical History:   Procedure Laterality Date   • CARDIAC CATHETERIZATION     • GALLBLADDER SURGERY     • HYSTERECTOMY     • KNEE SURGERY         Social History:  Social History     Socioeconomic History   • Marital status:      Spouse name: Not on file   • Number of children: Not on file   • Years of education: Not on file   • Highest education level: Not on file   Tobacco Use   • Smoking status: Never Smoker   • Smokeless tobacco: Never Used       Review of Systems:  The following systems were reviewed as they relate to the cardiovascular system: Constitutional, Eyes, ENT, Cardiovascular, Respiratory, Gastrointestinal, Integumentary, Neurological, Psychiatric, Hematologic, Endocrine, Musculoskeletal, and Genitourinary. The pertinent cardiovascular findings are reported above with all other cardiovascular points within those systems being negative.    Diagnostic Study Review:     Current Electrocardiogram:  Procedures      NOTE: The following portions of the patient's history were  "reviewed and updated this visit as appropriate: allergies, current medications, past family history, past medical history, past social history, past surgical history and problem list.    A total of 12 minutes of medical discussion occurred during this encounter.      Novel Coronavirus (COVID-19) Disclaimer:     A proclamation declaring a national emergency concerning the Novel Coronavirus Disease (COVID-19) Outbreak was issued on March 13, 2020 at the direction of the .    This virtual visit was performed with the patient's consent in lieu of the patient's regularly scheduled appointment in order to provide the patient with the opportunity to maintain contact with their healthcare provider while also adhering to social distancing guidelines set forth by the CDC to reduce exposure to the Novel Coronavirus (COVID-19).  Any vital signs obtained during this visit were provided by the patient.    EMR Dragon/Transcription:   \"Dictated utilizing Dragon dictation\".   "

## 2020-12-03 ENCOUNTER — OFFICE (AMBULATORY)
Dept: URBAN - METROPOLITAN AREA CLINIC 64 | Facility: CLINIC | Age: 50
End: 2020-12-03
Payer: OTHER GOVERNMENT

## 2020-12-03 VITALS
HEART RATE: 81 BPM | HEIGHT: 62 IN | DIASTOLIC BLOOD PRESSURE: 73 MMHG | SYSTOLIC BLOOD PRESSURE: 129 MMHG | WEIGHT: 246 LBS

## 2020-12-03 DIAGNOSIS — R10.13 EPIGASTRIC PAIN: ICD-10-CM

## 2020-12-03 PROCEDURE — 99213 OFFICE O/P EST LOW 20 MIN: CPT | Performed by: NURSE PRACTITIONER

## 2020-12-03 RX ORDER — AMITRIPTYLINE HYDROCHLORIDE 50 MG/1
TABLET, FILM COATED ORAL
Qty: 90 | Refills: 3 | Status: ACTIVE

## 2021-02-17 ENCOUNTER — OFFICE (AMBULATORY)
Dept: URBAN - METROPOLITAN AREA CLINIC 64 | Facility: CLINIC | Age: 51
End: 2021-02-17

## 2021-02-17 VITALS
SYSTOLIC BLOOD PRESSURE: 128 MMHG | WEIGHT: 259 LBS | HEIGHT: 62 IN | HEART RATE: 92 BPM | DIASTOLIC BLOOD PRESSURE: 74 MMHG

## 2021-02-17 DIAGNOSIS — K21.9 GASTRO-ESOPHAGEAL REFLUX DISEASE WITHOUT ESOPHAGITIS: ICD-10-CM

## 2021-02-17 DIAGNOSIS — R13.10 DYSPHAGIA, UNSPECIFIED: ICD-10-CM

## 2021-02-17 DIAGNOSIS — E66.9 OBESITY, UNSPECIFIED: ICD-10-CM

## 2021-02-17 DIAGNOSIS — K59.00 CONSTIPATION, UNSPECIFIED: ICD-10-CM

## 2021-02-17 DIAGNOSIS — K31.7 POLYP OF STOMACH AND DUODENUM: ICD-10-CM

## 2021-02-17 DIAGNOSIS — R10.13 EPIGASTRIC PAIN: ICD-10-CM

## 2021-02-17 PROCEDURE — 99214 OFFICE O/P EST MOD 30 MIN: CPT | Performed by: INTERNAL MEDICINE

## 2021-02-22 ENCOUNTER — OFFICE VISIT (OUTPATIENT)
Dept: CARDIOLOGY | Facility: CLINIC | Age: 51
End: 2021-02-22

## 2021-02-22 VITALS
WEIGHT: 259 LBS | HEART RATE: 86 BPM | BODY MASS INDEX: 47.37 KG/M2 | OXYGEN SATURATION: 98 % | DIASTOLIC BLOOD PRESSURE: 78 MMHG | SYSTOLIC BLOOD PRESSURE: 115 MMHG

## 2021-02-22 DIAGNOSIS — E78.2 MIXED HYPERLIPIDEMIA: ICD-10-CM

## 2021-02-22 DIAGNOSIS — G47.33 OBSTRUCTIVE SLEEP APNEA SYNDROME: ICD-10-CM

## 2021-02-22 DIAGNOSIS — I50.30 DIASTOLIC CONGESTIVE HEART FAILURE, UNSPECIFIED HF CHRONICITY (HCC): ICD-10-CM

## 2021-02-22 DIAGNOSIS — I25.10 CHRONIC CORONARY ARTERY DISEASE: Primary | ICD-10-CM

## 2021-02-22 DIAGNOSIS — E11.9 TYPE 2 DIABETES MELLITUS WITHOUT COMPLICATION, WITHOUT LONG-TERM CURRENT USE OF INSULIN (HCC): ICD-10-CM

## 2021-02-22 DIAGNOSIS — I10 BENIGN HYPERTENSION: ICD-10-CM

## 2021-02-22 PROCEDURE — 93000 ELECTROCARDIOGRAM COMPLETE: CPT | Performed by: NURSE PRACTITIONER

## 2021-02-22 PROCEDURE — 99213 OFFICE O/P EST LOW 20 MIN: CPT | Performed by: NURSE PRACTITIONER

## 2021-02-22 RX ORDER — AMITRIPTYLINE HYDROCHLORIDE 25 MG/1
25 TABLET, FILM COATED ORAL NIGHTLY
COMMUNITY

## 2021-02-22 NOTE — PROGRESS NOTES
[facility] CARDIOLOGY      REASON FOR FOLLOW-UP:  Coronary artery disease-small vessel  Hypertension with hypertensive cardiovascular disease  History of heart failure with preserved EF    You have chosen to receive care through a telephone visit. Do you consent to use a telephone visit for your medical care today? Yes    This visit has been rescheduled as a phone visit to comply with patient safety concerns in accordance with CDC recommendations. Total time of discussion was 15 minutes.        Chief Complaint   Patient presents with   • Coronary Artery Disease     3 month f/u   • Hypertension         Dear Dr. Cronin,    History of Present Illness     I had the pleasure of performing a telephone visit with Gissel Amaro today. As you are well aware, this is a 50 y.o. female with known history of small vessel coronary artery disease per heart catheterization performed 12/12/2018 in which she was found to have small vessel disease in the distal LAD with elevated LVEDP.  Left ventricular ejection fraction low normal at 50%.  She has additional history that includes diabetes mellitus 2, hypertension with hypertensive cardiovascular disease, dyslipidemia, history of lower extremity DVT on anticoagulation prior, history of heart failure with preserved ejection fraction.  History of asthma and pneumonia.  The patient was admitted to [facility] for 5 days for shortness of breath.  2D echocardiogram was performed that showed normal LV systolic function with EF 60-65%.  Trace TR/AR.  Normal pulmonary artery pressure.  Lisinopril was discontinued secondary to kidney disease.  Recent ABIs normal, chest x-ray at 9/12/2020 with atelectasis.  VQ scan 9/8/2020 with no evidence of PE.     She presents today in follow-up for the above-mentioned diagnoses.    Today, patient reports that she feels well.  She denies any complaints of chest pains, pressure, tightness or palpitations.   She denies any shortness of breath at rest, but does have some dyspnea with exertion.  She denies any dizziness or lightheadedness.  She has no lower extremity edema today.  Patient reports having labs December 2020.  We will request copy of that report.  EKG in the office today shows normal sinus rhythm.  Blood pressure under excellent control 115/78      Assessment:  Coronary artery disease-nonocclusive  History of heart failure with preserved ejection fraction  Hypertension with hypertensive cardiovascular disease  Dyslipidemia  History of lower extremity DVT  Morbid obesity  Trivial valvular heart disease  Recent COVID-19 illness    Plan:  Continue current medical plan of care  Lipids per your office  Follow-up in 6 months or sooner if needed.        The following portions of the patient's history were reviewed and updated as appropriate: allergies, current medications, past family history, past medical history, past social history, past surgical history and problem list.    REVIEW OF SYSTEMS:    Review of Systems   Constitution: Positive for malaise/fatigue.   Cardiovascular: Positive for dyspnea on exertion.   All other systems reviewed and are negative.      Vitals:    02/22/21 1500   BP: 115/78   Pulse: 86   SpO2: 98%         PHYSICAL EXAM:    General: Well-developed, morbidly obese 50-year-old  female with BMI 47.37 who is alert, cooperative, no distress, appears stated age  Head:  Normocephalic, atraumatic, mucous membranes moist  Eyes:  Conjunctiva/corneas clear, EOM's intact     Neck:  Supple,  no JVD or bruit     Lungs: Clear to auscultation bilaterally, no wheezes rhonchi rales are noted  Chest wall: No tenderness  Musculoskeletal:   Ambulates freely without assistance  Heart::  Regular rate and rhythm, S1 and S2 normal, no murmur, rub or gallop  Abdomen: Soft, non-tender, nondistended, bowel sounds active, no abdominal bruit  Extremities: No cyanosis, clubbing, or edema   Pulses: 2+ and  symmetric all extremities  Skin:  No rashes or lesions  Neuro/psych: A&O x3. CN II through XII are grossly intact with appropriate affect        Past Medical History:   Diagnosis Date   • Asthma    • CAD (coronary artery disease)    • Depression    • Diabetes (CMS/HCC)    • MAX (dyspnea on exertion)    • HTN (hypertension)    • Hyperlipemia        Past Surgical History:   Procedure Laterality Date   • CARDIAC CATHETERIZATION     • GALLBLADDER SURGERY     • HYSTERECTOMY     • KNEE SURGERY           Current Outpatient Medications:   •  albuterol sulfate HFA (VENTOLIN HFA) 108 (90 Base) MCG/ACT inhaler, VENTOLIN  (90 Base) MCG/ACT AERS, Disp: , Rfl:   •  amitriptyline (ELAVIL) 25 MG tablet, Take 25 mg by mouth Every Night., Disp: , Rfl:   •  Cariprazine HCl (Vraylar) 1.5 MG capsule capsule, Take 1.5 mg by mouth Daily., Disp: , Rfl:   •  fenofibrate (TRICOR) 145 MG tablet, fenofibrate nanocrystallized 145 mg tablet  Take 1 tablet every day by oral route., Disp: , Rfl:   •  furosemide (LASIX) 20 MG tablet, Take 20 mg by mouth Daily., Disp: , Rfl:   •  gabapentin (NEURONTIN) 300 MG capsule, TK 1 C PO TID, Disp: , Rfl: 5  •  HYDROcodone-acetaminophen (NORCO)  MG per tablet, hydrocodone 10 mg-acetaminophen 325 mg tablet, Disp: , Rfl:   •  insulin lispro (HUMALOG) 100 UNIT/ML injection, HUMALOG 100 UNIT/ML SOLN, Disp: , Rfl:   •  insulin regular (HUMULIN R) 500 UNIT/ML CONCENTRATED injection, Inject 150 Units under the skin into the appropriate area as directed 2 (two) times a day., Disp: , Rfl:   •  Magnesium Oxide -Mg Supplement 400 MG capsule, MAGNESIUM 400 MG CAPS, Disp: , Rfl:   •  pantoprazole (PROTONIX) 40 MG EC tablet, Take  by mouth Daily., Disp: , Rfl: 5  •  potassium chloride (MICRO-K) 10 MEQ CR capsule, Take 10 mEq by mouth Daily., Disp: , Rfl:   •  propranolol (INDERAL) 60 MG tablet, Daily., Disp: , Rfl:   •  rOPINIRole (REQUIP) 2 MG tablet, Take 2 mg by mouth Every Night., Disp: , Rfl:   •   "sertraline (ZOLOFT) 100 MG tablet, SERTRALINE  MG TABS, Disp: , Rfl:   •  simvastatin (ZOCOR) 40 MG tablet, SIMVASTATIN 40 MG TABS, Disp: , Rfl:   •  sucralfate (CARAFATE) 1 g tablet, DISSOLVE 1 TABLET IN WATER AND DRINK QID UTD, Disp: , Rfl: 0  •  TiZANidine (ZANAFLEX) 4 MG capsule, TIZANIDINE HCL 4 MG CAPS, Disp: , Rfl:   •  topiramate (TOPAMAX) 50 MG tablet, Every 12 (Twelve) Hours., Disp: , Rfl:   •  TRULICITY 1.5 MG/0.5ML solution pen-injector, INJ 0.5 ML SC Q WEEK, Disp: , Rfl: 11  •  venlafaxine XR (EFFEXOR-XR) 75 MG 24 hr capsule, venlafaxine ER 75 mg capsule,extended release 24 hr  Take 1 capsule every day by oral route., Disp: , Rfl:     Allergies   Allergen Reactions   • Penicillins Unknown (See Comments)       Family History   Problem Relation Age of Onset   • Heart disease Father        Social History     Tobacco Use   • Smoking status: Never Smoker   • Smokeless tobacco: Never Used   Substance Use Topics   • Alcohol use: Not on file           Current Electrocardiogram:    ECG 12 Lead    Date/Time: 2/22/2021 3:35 PM  Performed by: Fidelina Cronin APRN  Authorized by: Fidelina Cronin APRN   Comparison: not compared with previous ECG   Rhythm: sinus rhythm  BPM: 83                  EMR Dragon/Transcription:   \"Dictated utilizing Dragon dictation\".     Disclaimer: Please note that areas of this note were completed with computer voice recognition software.  Quite often unanticipated grammatical, syntax, homophones, and other interpretive errors are inadvertently transcribed by the computer software. Please excuse any errors that have escaped final proofreading    "

## 2021-02-25 ENCOUNTER — ON CAMPUS - OUTPATIENT (AMBULATORY)
Dept: URBAN - METROPOLITAN AREA HOSPITAL 77 | Facility: HOSPITAL | Age: 51
End: 2021-02-25

## 2021-02-25 DIAGNOSIS — K31.7 POLYP OF STOMACH AND DUODENUM: ICD-10-CM

## 2021-02-25 DIAGNOSIS — R13.10 DYSPHAGIA, UNSPECIFIED: ICD-10-CM

## 2021-02-25 PROCEDURE — 43450 DILATE ESOPHAGUS 1/MULT PASS: CPT | Performed by: INTERNAL MEDICINE

## 2021-02-25 PROCEDURE — 43251 EGD REMOVE LESION SNARE: CPT | Performed by: INTERNAL MEDICINE

## 2021-05-14 ENCOUNTER — OFFICE (AMBULATORY)
Dept: URBAN - METROPOLITAN AREA CLINIC 64 | Facility: CLINIC | Age: 51
End: 2021-05-14

## 2021-05-19 ENCOUNTER — OFFICE (AMBULATORY)
Dept: URBAN - METROPOLITAN AREA CLINIC 64 | Facility: CLINIC | Age: 51
End: 2021-05-19

## 2021-05-19 VITALS
SYSTOLIC BLOOD PRESSURE: 129 MMHG | HEART RATE: 92 BPM | HEIGHT: 62 IN | WEIGHT: 259 LBS | DIASTOLIC BLOOD PRESSURE: 75 MMHG

## 2021-05-19 DIAGNOSIS — R13.10 DYSPHAGIA, UNSPECIFIED: ICD-10-CM

## 2021-05-19 DIAGNOSIS — R07.89 OTHER CHEST PAIN: ICD-10-CM

## 2021-05-19 DIAGNOSIS — R10.13 EPIGASTRIC PAIN: ICD-10-CM

## 2021-05-19 PROCEDURE — 99212 OFFICE O/P EST SF 10 MIN: CPT | Performed by: NURSE PRACTITIONER

## 2021-05-26 ENCOUNTER — ON CAMPUS - OUTPATIENT (AMBULATORY)
Dept: URBAN - METROPOLITAN AREA HOSPITAL 77 | Facility: HOSPITAL | Age: 51
End: 2021-05-26

## 2021-05-26 DIAGNOSIS — K31.7 POLYP OF STOMACH AND DUODENUM: ICD-10-CM

## 2021-05-26 DIAGNOSIS — K29.50 UNSPECIFIED CHRONIC GASTRITIS WITHOUT BLEEDING: ICD-10-CM

## 2021-05-26 DIAGNOSIS — R13.10 DYSPHAGIA, UNSPECIFIED: ICD-10-CM

## 2021-05-26 PROCEDURE — 43450 DILATE ESOPHAGUS 1/MULT PASS: CPT | Performed by: INTERNAL MEDICINE

## 2021-05-26 PROCEDURE — 43239 EGD BIOPSY SINGLE/MULTIPLE: CPT | Performed by: INTERNAL MEDICINE

## 2021-07-15 ENCOUNTER — TRANSCRIBE ORDERS (OUTPATIENT)
Dept: ADMINISTRATIVE | Facility: HOSPITAL | Age: 51
End: 2021-07-15

## 2021-07-15 ENCOUNTER — HOSPITAL ENCOUNTER (OUTPATIENT)
Dept: GENERAL RADIOLOGY | Facility: HOSPITAL | Age: 51
Discharge: HOME | End: 2021-07-15

## 2021-07-15 DIAGNOSIS — Z02.71 ENCOUNTER FOR DISABILITY DETERMINATION: ICD-10-CM

## 2021-07-15 DIAGNOSIS — Z02.71 ENCOUNTER FOR DISABILITY DETERMINATION: Primary | ICD-10-CM

## 2021-07-15 PROCEDURE — 73030 X-RAY EXAM OF SHOULDER: CPT

## 2021-07-15 PROCEDURE — 73560 X-RAY EXAM OF KNEE 1 OR 2: CPT

## 2021-07-15 PROCEDURE — 72100 X-RAY EXAM L-S SPINE 2/3 VWS: CPT

## 2021-08-23 ENCOUNTER — OFFICE VISIT (OUTPATIENT)
Dept: CARDIOLOGY | Facility: CLINIC | Age: 51
End: 2021-08-23

## 2021-08-23 VITALS
DIASTOLIC BLOOD PRESSURE: 90 MMHG | SYSTOLIC BLOOD PRESSURE: 153 MMHG | WEIGHT: 270 LBS | OXYGEN SATURATION: 96 % | HEART RATE: 85 BPM | BODY MASS INDEX: 49.38 KG/M2

## 2021-08-23 DIAGNOSIS — E78.2 MIXED HYPERLIPIDEMIA: ICD-10-CM

## 2021-08-23 DIAGNOSIS — R60.0 LOCALIZED EDEMA: ICD-10-CM

## 2021-08-23 DIAGNOSIS — I25.10 CHRONIC CORONARY ARTERY DISEASE: Primary | ICD-10-CM

## 2021-08-23 DIAGNOSIS — I10 BENIGN HYPERTENSION: ICD-10-CM

## 2021-08-23 DIAGNOSIS — E66.01 CLASS 3 SEVERE OBESITY DUE TO EXCESS CALORIES WITH SERIOUS COMORBIDITY AND BODY MASS INDEX (BMI) OF 45.0 TO 49.9 IN ADULT (HCC): ICD-10-CM

## 2021-08-23 PROCEDURE — 99213 OFFICE O/P EST LOW 20 MIN: CPT | Performed by: NURSE PRACTITIONER

## 2021-08-23 PROCEDURE — 93000 ELECTROCARDIOGRAM COMPLETE: CPT | Performed by: NURSE PRACTITIONER

## 2021-08-23 RX ORDER — ERGOCALCIFEROL 1.25 MG/1
50000 CAPSULE ORAL WEEKLY
COMMUNITY

## 2021-08-23 NOTE — PROGRESS NOTES
[facility] CARDIOLOGY      REASON FOR FOLLOW-UP:  Small vessel coronary artery disease  Mild LV dysfunction  Hypertension with hypertensive cardiovascular disease          Chief Complaint   Patient presents with   • Hypertension     f/u    • Hyperlipidemia         Dear Sony Wong MD        History of Present Illness     I had the pleasure of performing a telephone visit with Gissel Pandyaron today. As you are well aware, this is a 51y.o. female with known history of coronary artery disease per heart catheterization performed 12/12/2018 in which she was found to have small vessel disease in the distal LAD with elevated LVEDP.  Left ventricular ejection fraction low normal at 50%.  She has additional history that includes diabetes mellitus 2, hypertension with hypertensive cardiovascular disease, dyslipidemia, history of lower extremity DVT on anticoagulation prior, history of heart failure with preserved ejection fraction.  History of asthma and pneumonia.  The patient was admitted to [facility] recently for 5 days for shortness of breath.  2D echocardiogram was performed that showed normal LV systolic function with EF 60-65%.  Trace TR/AR.  Normal pulmonary artery pressure.  Lisinopril was discontinued secondary to kidney disease.  Recent ABIs normal, chest x-ray at 9/12/2020 with atelectasis.  VQ scan 9/8/2020 with no evidence of PE.     She presents today in follow-up for these diagnoses.    Today, patient reports no symptoms of chest pain, pressure, tightness or palpitations.  She denies any shortness of breath.  Patient stated she has experienced increase edema of lower extremities and 30 pound weight gain since January of this year.  She denies orthopnea, PND or dizziness.  December 2020 A1c 8.8, triglycerides 406, .    ASSESSMENT:  Coronary artery disease-nonocclusive  History of heart failure with preserved ejection fraction  Hypertension with  hypertensive cardiovascular disease  Dyslipidemia  Chronic kidney disease  History of lower extremity DVT  Morbid obesity  Trivial valvular heart disease  Recent COVID-19 illness    PLAN:  Patient denies increased fluid intake or salt consumption.  Patient may take extra 40 mg Lasix daily tomorrow.  She has follow-up with her nephrologist and endocrinologist this week.  Patient encouraged to monitor fluid and salt intake.  Follow-up our office 6 months or sooner if needed      The following portions of the patient's history were reviewed and updated as appropriate: allergies, current medications, past family history, past medical history, past social history, past surgical history and problem list.    REVIEW OF SYSTEMS:    Review of Systems   Constitutional: Positive for weight gain.   Cardiovascular: Positive for leg swelling.   All other systems reviewed and are negative.      Vitals:    08/23/21 1320   BP: 153/90   Pulse: 85   SpO2: 96%         PHYSICAL EXAM:    General: Alert, cooperative, no distress, appears stated age  Head:  Normocephalic, atraumatic, mucous membranes moist  Eyes:  Conjunctiva/corneas clear, EOM's intact     Neck:  Supple,  no JVD or bruit     Lungs: Clear to auscultation bilaterally, no wheezes rhonchi rales are noted  Chest wall: No tenderness  Musculoskeletal:   Ambulates freely without assistance  Heart::  Regular rate and rhythm, S1 and S2 normal, no murmur, rub or gallop  Abdomen: Soft, non-tender, nondistended, bowel sounds active, no abdominal bruit  Extremities: No cyanosis, clubbing, or edema   Pulses: 2+ and symmetric all extremities  Skin:  No rashes or lesions  Neuro/psych: A&O x3. CN II through XII are grossly intact with appropriate affect        Past Medical History:   Diagnosis Date   • Asthma    • CAD (coronary artery disease)    • Depression    • Diabetes (CMS/HCC)    • MAX (dyspnea on exertion)    • HTN (hypertension)    • Hyperlipemia        Past Surgical History:    Procedure Laterality Date   • CARDIAC CATHETERIZATION     • GALLBLADDER SURGERY     • HYSTERECTOMY     • KNEE SURGERY           Current Outpatient Medications:   •  albuterol sulfate HFA (VENTOLIN HFA) 108 (90 Base) MCG/ACT inhaler, VENTOLIN  (90 Base) MCG/ACT AERS, Disp: , Rfl:   •  amitriptyline (ELAVIL) 25 MG tablet, Take 25 mg by mouth Every Night., Disp: , Rfl:   •  Cariprazine HCl (Vraylar) 1.5 MG capsule capsule, Take 1.5 mg by mouth Daily., Disp: , Rfl:   •  fenofibrate (TRICOR) 145 MG tablet, fenofibrate nanocrystallized 145 mg tablet  Take 1 tablet every day by oral route., Disp: , Rfl:   •  furosemide (LASIX) 20 MG tablet, Take 40 mg by mouth Daily., Disp: , Rfl:   •  gabapentin (NEURONTIN) 300 MG capsule, TK 1 C PO TID, Disp: , Rfl: 5  •  HYDROcodone-acetaminophen (NORCO)  MG per tablet, hydrocodone 10 mg-acetaminophen 325 mg tablet, Disp: , Rfl:   •  insulin lispro (HUMALOG) 100 UNIT/ML injection, HUMALOG 100 UNIT/ML SOLN, Disp: , Rfl:   •  insulin regular (HUMULIN R) 500 UNIT/ML CONCENTRATED injection, Inject 150 Units under the skin into the appropriate area as directed 2 (two) times a day., Disp: , Rfl:   •  pantoprazole (PROTONIX) 40 MG EC tablet, Take  by mouth Daily., Disp: , Rfl: 5  •  potassium chloride (MICRO-K) 10 MEQ CR capsule, Take 10 mEq by mouth Daily., Disp: , Rfl:   •  propranolol (INDERAL) 60 MG tablet, 80 mg Daily., Disp: , Rfl:   •  rOPINIRole (REQUIP) 2 MG tablet, Take 2 mg by mouth Every Night., Disp: , Rfl:   •  sertraline (ZOLOFT) 100 MG tablet, SERTRALINE  MG TABS, Disp: , Rfl:   •  simvastatin (ZOCOR) 40 MG tablet, SIMVASTATIN 40 MG TABS, Disp: , Rfl:   •  sucralfate (CARAFATE) 1 g tablet, DISSOLVE 1 TABLET IN WATER AND DRINK QID UTD, Disp: , Rfl: 0  •  TiZANidine (ZANAFLEX) 4 MG capsule, TIZANIDINE HCL 4 MG CAPS, Disp: , Rfl:   •  vitamin D (ERGOCALCIFEROL) 1.25 MG (85431 UT) capsule capsule, Take 50,000 Units by mouth 1 (One) Time Per Week., Disp: , Rfl:  "  •  Magnesium Oxide -Mg Supplement 400 MG capsule, MAGNESIUM 400 MG CAPS, Disp: , Rfl:   •  topiramate (TOPAMAX) 50 MG tablet, Every 12 (Twelve) Hours., Disp: , Rfl:   •  TRULICITY 1.5 MG/0.5ML solution pen-injector, INJ 0.5 ML SC Q WEEK, Disp: , Rfl: 11  •  venlafaxine XR (EFFEXOR-XR) 75 MG 24 hr capsule, venlafaxine ER 75 mg capsule,extended release 24 hr  Take 1 capsule every day by oral route., Disp: , Rfl:     Allergies   Allergen Reactions   • Penicillins Unknown (See Comments)       Family History   Problem Relation Age of Onset   • Heart disease Father        Social History     Tobacco Use   • Smoking status: Never Smoker   • Smokeless tobacco: Never Used   Substance Use Topics   • Alcohol use: Not on file           Current Electrocardiogram:    ECG 12 Lead    Date/Time: 8/23/2021 4:01 PM  Performed by: Fidelina Cronin APRN  Authorized by: Fidelina Cronin APRN   Comparison: not compared with previous ECG   Rhythm: sinus rhythm  BPM: 85  Other findings: non-specific ST-T wave changes                EMR Dragon/Transcription:   \"Dictated utilizing Dragon dictation\".       "

## 2021-10-13 ENCOUNTER — OFFICE (AMBULATORY)
Dept: URBAN - METROPOLITAN AREA CLINIC 64 | Facility: CLINIC | Age: 51
End: 2021-10-13
Payer: OTHER GOVERNMENT

## 2021-10-13 VITALS
SYSTOLIC BLOOD PRESSURE: 186 MMHG | HEART RATE: 97 BPM | DIASTOLIC BLOOD PRESSURE: 93 MMHG | HEIGHT: 62 IN | WEIGHT: 275 LBS

## 2021-10-13 DIAGNOSIS — R13.10 DYSPHAGIA, UNSPECIFIED: ICD-10-CM

## 2021-10-13 PROCEDURE — 99214 OFFICE O/P EST MOD 30 MIN: CPT | Performed by: NURSE PRACTITIONER

## 2021-10-13 RX ORDER — AMITRIPTYLINE HYDROCHLORIDE 50 MG/1
TABLET, FILM COATED ORAL
Qty: 90 | Refills: 3 | Status: ACTIVE

## 2021-11-17 ENCOUNTER — ON CAMPUS - OUTPATIENT (AMBULATORY)
Dept: URBAN - METROPOLITAN AREA HOSPITAL 77 | Facility: HOSPITAL | Age: 51
End: 2021-11-17
Payer: OTHER GOVERNMENT

## 2021-11-17 DIAGNOSIS — K31.7 POLYP OF STOMACH AND DUODENUM: ICD-10-CM

## 2021-11-17 DIAGNOSIS — R13.10 DYSPHAGIA, UNSPECIFIED: ICD-10-CM

## 2021-11-17 DIAGNOSIS — K29.50 UNSPECIFIED CHRONIC GASTRITIS WITHOUT BLEEDING: ICD-10-CM

## 2021-11-17 PROCEDURE — 43450 DILATE ESOPHAGUS 1/MULT PASS: CPT | Performed by: INTERNAL MEDICINE

## 2021-11-17 PROCEDURE — 43239 EGD BIOPSY SINGLE/MULTIPLE: CPT | Performed by: INTERNAL MEDICINE

## 2021-11-29 ENCOUNTER — OFFICE VISIT (OUTPATIENT)
Dept: CARDIOLOGY | Facility: CLINIC | Age: 51
End: 2021-11-29

## 2021-11-29 VITALS
WEIGHT: 283 LBS | OXYGEN SATURATION: 94 % | HEIGHT: 62 IN | BODY MASS INDEX: 52.08 KG/M2 | SYSTOLIC BLOOD PRESSURE: 150 MMHG | DIASTOLIC BLOOD PRESSURE: 90 MMHG | HEART RATE: 88 BPM

## 2021-11-29 DIAGNOSIS — I25.10 CHRONIC CORONARY ARTERY DISEASE: ICD-10-CM

## 2021-11-29 DIAGNOSIS — I10 BENIGN HYPERTENSION: Primary | ICD-10-CM

## 2021-11-29 DIAGNOSIS — G47.33 OBSTRUCTIVE SLEEP APNEA SYNDROME: ICD-10-CM

## 2021-11-29 DIAGNOSIS — E78.2 MIXED HYPERLIPIDEMIA: ICD-10-CM

## 2021-11-29 PROCEDURE — 99213 OFFICE O/P EST LOW 20 MIN: CPT | Performed by: NURSE PRACTITIONER

## 2021-11-29 NOTE — PROGRESS NOTES
[facility] CARDIOLOGY      REASON FOR FOLLOW-UP:  Small vessel coronary artery disease  Mild LV dysfunction  Hypertension with hypertensive cardiovascular disease          Chief Complaint   Patient presents with   • Coronary Artery Disease     3 mo f/u    • Hypertension   • Hyperlipidemia         Dear Sony Wong MD        History of Present Illness   It was my pleasure to see Gissel in the office today.  She is a 51 y.o. female with known history of coronary artery disease per heart catheterization performed 12/12/2018 in which she was found to have small vessel disease in the distal LAD with elevated LVEDP.  Left ventricular ejection fraction low normal at 50%.  She has additional history that includes diabetes mellitus 2, hypertension with hypertensive cardiovascular disease, dyslipidemia, history of lower extremity DVT on anticoagulation prior, history of heart failure with preserved ejection fraction.  2D echo performed 9/9/2020 with normal LV systolic function with EF 60-65%.  Trace TR/AR.  Normal pulmonary artery pressure.  She presents today in follow-up for the above-mentioned diagnoses.    Today, the patient reports that she feels well from a CV standpoint.  She denies any chest pain, pressure, tightness or palpitations.  She reports no shortness of breath at rest but does have dyspnea with exertion that is not out of character for her.  Her lower extremity edema has improved significantly.  She denies orthopnea or PND.  No dizziness, lightheadedness, near syncopal or syncopal episodes.    Patient reports having labs recently at [place].  We will try to obtain copies of those.  She is taking Lasix as needed with supplemental potassium.  Her only complaint today is back pain from recent diagnosis of cervical stenosis.      ASSESSMENT:  Coronary artery disease-nonocclusive  History of heart failure with preserved ejection fraction  Hypertension with hypertensive  cardiovascular disease  Dyslipidemia  Chronic kidney disease  History of lower extremity DVT  Morbid obesity with BMI 51.76  Trivial valvular heart disease        PLAN:  Continue current CV plan of care    Follow-up in 6 months or sooner if needed        The following portions of the patient's history were reviewed and updated as appropriate: allergies, current medications, past family history, past medical history, past social history, past surgical history and problem list.    REVIEW OF SYSTEMS:    Review of Systems   Musculoskeletal: Positive for back pain.   All other systems reviewed and are negative.      Vitals:    11/29/21 1342   BP: 150/90   Pulse:    SpO2:          PHYSICAL EXAM:    General: Alert, cooperative, no distress, appears stated age  Head:  Normocephalic, atraumatic, mucous membranes moist  Eyes:  Conjunctiva/corneas clear, EOM's intact     Neck:  Supple,  no JVD or bruit     Lungs: Clear to auscultation bilaterally, no wheezes rhonchi rales are noted  Chest wall: No tenderness  Musculoskeletal:   Ambulates freely without assistance  Heart::  Regular rate and rhythm, S1 and S2 normal, no murmur, rub or gallop  Abdomen: Soft, non-tender, nondistended, bowel sounds active, no abdominal bruit  Extremities: No cyanosis, clubbing, or edema   Pulses: 2+ and symmetric all extremities  Skin:  No rashes or lesions  Neuro/psych: A&O x3. CN II through XII are grossly intact with appropriate affect        Past Medical History:   Diagnosis Date   • Asthma    • CAD (coronary artery disease)    • Depression    • Diabetes (HCC)    • MAX (dyspnea on exertion)    • HTN (hypertension)    • Hyperlipemia        Past Surgical History:   Procedure Laterality Date   • CARDIAC CATHETERIZATION     • GALLBLADDER SURGERY     • HYSTERECTOMY     • KNEE SURGERY           Current Outpatient Medications:   •  albuterol sulfate HFA (VENTOLIN HFA) 108 (90 Base) MCG/ACT inhaler, VENTOLIN  (90 Base) MCG/ACT AERS, Disp: , Rfl:  "  •  amitriptyline (ELAVIL) 25 MG tablet, Take 25 mg by mouth Every Night., Disp: , Rfl:   •  Cariprazine HCl (Vraylar) 1.5 MG capsule capsule, Take 1.5 mg by mouth Daily., Disp: , Rfl:   •  fenofibrate (TRICOR) 145 MG tablet, fenofibrate nanocrystallized 145 mg tablet  Take 1 tablet every day by oral route., Disp: , Rfl:   •  furosemide (LASIX) 20 MG tablet, Take 20 mg by mouth Daily., Disp: , Rfl:   •  gabapentin (NEURONTIN) 300 MG capsule, TK 1 C PO TID, Disp: , Rfl: 5  •  HYDROcodone-acetaminophen (NORCO)  MG per tablet, hydrocodone 10 mg-acetaminophen 325 mg tablet, Disp: , Rfl:   •  insulin regular (HUMULIN R) 500 UNIT/ML CONCENTRATED injection, Inject 150 Units under the skin into the appropriate area as directed 2 (two) times a day., Disp: , Rfl:   •  pantoprazole (PROTONIX) 40 MG EC tablet, Take  by mouth Daily., Disp: , Rfl: 5  •  potassium chloride (MICRO-K) 10 MEQ CR capsule, Take 10 mEq by mouth Daily., Disp: , Rfl:   •  propranolol (INDERAL) 60 MG tablet, 80 mg Daily., Disp: , Rfl:   •  rOPINIRole (REQUIP) 2 MG tablet, Take 2 mg by mouth Every Night., Disp: , Rfl:   •  sertraline (ZOLOFT) 100 MG tablet, SERTRALINE  MG TABS, Disp: , Rfl:   •  simvastatin (ZOCOR) 40 MG tablet, SIMVASTATIN 40 MG TABS, Disp: , Rfl:   •  TiZANidine (ZANAFLEX) 4 MG capsule, TIZANIDINE HCL 4 MG CAPS, Disp: , Rfl:   •  vitamin D (ERGOCALCIFEROL) 1.25 MG (18193 UT) capsule capsule, Take 50,000 Units by mouth 1 (One) Time Per Week., Disp: , Rfl:     Allergies   Allergen Reactions   • Penicillins Unknown (See Comments)       Family History   Problem Relation Age of Onset   • Heart disease Father        Social History     Tobacco Use   • Smoking status: Never Smoker   • Smokeless tobacco: Never Used   Substance Use Topics   • Alcohol use: Not on file           Current Electrocardiogram:  Procedures        EMR Dragon/Transcription:   \"Dictated utilizing Dragon dictation\".       "

## 2022-01-12 ENCOUNTER — OFFICE (AMBULATORY)
Dept: URBAN - METROPOLITAN AREA CLINIC 64 | Facility: CLINIC | Age: 52
End: 2022-01-12
Payer: COMMERCIAL

## 2022-01-12 VITALS
DIASTOLIC BLOOD PRESSURE: 75 MMHG | WEIGHT: 278 LBS | SYSTOLIC BLOOD PRESSURE: 153 MMHG | HEIGHT: 62 IN | HEART RATE: 100 BPM

## 2022-01-12 DIAGNOSIS — R10.13 EPIGASTRIC PAIN: ICD-10-CM

## 2022-01-12 DIAGNOSIS — R13.10 DYSPHAGIA, UNSPECIFIED: ICD-10-CM

## 2022-01-12 PROCEDURE — 99213 OFFICE O/P EST LOW 20 MIN: CPT | Performed by: NURSE PRACTITIONER

## 2022-01-12 RX ORDER — DICYCLOMINE HYDROCHLORIDE 20 MG/1
80 TABLET ORAL
Qty: 120 | Refills: 12 | Status: COMPLETED
Start: 2022-01-12 | End: 2022-10-31

## 2022-04-26 ENCOUNTER — OFFICE VISIT (OUTPATIENT)
Dept: PSYCHIATRY | Facility: CLINIC | Age: 52
End: 2022-04-26

## 2022-04-26 ENCOUNTER — CONSULT (OUTPATIENT)
Dept: BARIATRICS/WEIGHT MGMT | Facility: CLINIC | Age: 52
End: 2022-04-26

## 2022-04-26 ENCOUNTER — OFFICE VISIT (OUTPATIENT)
Dept: BARIATRICS/WEIGHT MGMT | Facility: CLINIC | Age: 52
End: 2022-04-26

## 2022-04-26 VITALS
DIASTOLIC BLOOD PRESSURE: 80 MMHG | HEIGHT: 62 IN | RESPIRATION RATE: 16 BRPM | BODY MASS INDEX: 50.16 KG/M2 | HEART RATE: 80 BPM | WEIGHT: 272.6 LBS | SYSTOLIC BLOOD PRESSURE: 144 MMHG | OXYGEN SATURATION: 94 %

## 2022-04-26 DIAGNOSIS — E66.01 MORBID OBESITY: Primary | Chronic | ICD-10-CM

## 2022-04-26 DIAGNOSIS — E66.01 OBESITY, CLASS III, BMI 40-49.9 (MORBID OBESITY): Primary | ICD-10-CM

## 2022-04-26 DIAGNOSIS — Z01.818 PRE-OPERATIVE EXAMINATION: ICD-10-CM

## 2022-04-26 PROCEDURE — 99205 OFFICE O/P NEW HI 60 MIN: CPT | Performed by: NURSE PRACTITIONER

## 2022-04-26 PROCEDURE — 90791 PSYCH DIAGNOSTIC EVALUATION: CPT | Performed by: PSYCHIATRY & NEUROLOGY

## 2022-04-26 RX ORDER — DAPAGLIFLOZIN 10 MG/1
10 TABLET, FILM COATED ORAL NIGHTLY
COMMUNITY

## 2022-04-26 RX ORDER — ONDANSETRON HYDROCHLORIDE 8 MG/1
8 TABLET, FILM COATED ORAL EVERY 8 HOURS PRN
COMMUNITY

## 2022-04-26 RX ORDER — TEMAZEPAM 30 MG/1
30 CAPSULE ORAL NIGHTLY PRN
COMMUNITY
End: 2022-09-30

## 2022-04-26 RX ORDER — SUMATRIPTAN 100 MG/1
100 TABLET, FILM COATED ORAL
COMMUNITY

## 2022-04-26 RX ORDER — LOSARTAN POTASSIUM 25 MG/1
100 TABLET ORAL DAILY
COMMUNITY

## 2022-04-26 RX ORDER — INSULIN LISPRO 100 [IU]/ML
INJECTION, SOLUTION INTRAVENOUS; SUBCUTANEOUS AS NEEDED
COMMUNITY
End: 2022-09-30

## 2022-04-26 RX ORDER — DICYCLOMINE HCL 20 MG
20 TABLET ORAL 3 TIMES DAILY
COMMUNITY
End: 2022-09-30

## 2022-04-26 NOTE — PROGRESS NOTES
[facility] SURGERY  [address] [place] [address]  [address] [place] [address]  Dept: [phone]  4/26/2022      Gissel Amaro.  [id number]  [id number]  1970  female      Chief Complaint of weight gain; unable to maintain weight loss    History of Present Illness:   Gissel is a 51 y.o. female who presents today for evaluation, education and consultation regarding weight loss surgery. The patient is interested in the sleeve gastrectomy.      Diet History:See dietician/RN/MA documentation for complete history of weight and diet.     Bariatric Surgery Evaluation: The patient is being seen for an initial visit for bariatric surgery evaluation.       Patient Active Problem List   Diagnosis   • Benign hypertension   • Small vessel coronary artery disease   • Diabetes mellitus (HCC)   • Edema   • Mixed hyperlipidemia   • CHF (congestive heart failure) (HCC)   • Morbid obesity (HCC)   • Obstructive sleep apnea syndrome   • BMI 45.0-49.9, adult (HCC)   • Pre-operative examination   CHF -  Clara Cronin NP  ( with Dr. White office)   Sleep apnea - PCP 2 years ago - home - Dr. Wong office   On a CPAP- sleep apnea     Past Medical History:   Diagnosis Date   • Anxiety    • Asthma    • CAD (coronary artery disease)    • Depression    • Diabetes (HCC)    • MAX (dyspnea on exertion)    • Fibromyalgia    • GERD (gastroesophageal reflux disease)    • HTN (hypertension)    • Hyperlipemia    • IBS (irritable bowel syndrome)    • Plantar fasciitis    Blood clot 2012 after surgery     Past Surgical History:   Procedure Laterality Date   • BREAST SURGERY Bilateral     2015, 2020   • CARDIAC CATHETERIZATION     • COLONOSCOPY  2017   • ENDOSCOPY  2021   • GALLBLADDER SURGERY  2010   • HYSTERECTOMY  2013   • KNEE SURGERY Right 2017   • LAPAROSCOPIC TUBAL LIGATION  2000   • TONSILLECTOMY  1976   EGD in November - [facility]  polyp removal in stomach   4 foot surgeries tarsal tunnel     Allergies   Allergen Reactions   • Penicillins Unknown (See Comments)   • Trulicity [Dulaglutide] Other (See Comments)     Caused pancreatitis         Current Outpatient Medications:   •  albuterol sulfate  (90 Base) MCG/ACT inhaler, VENTOLIN  (90 Base) MCG/ACT AERS, Disp: , Rfl:   •  amitriptyline (ELAVIL) 25 MG tablet, Take 25 mg by mouth Every Night., Disp: , Rfl:   •  Cariprazine HCl (VRAYLAR) 1.5 MG capsule capsule, Take 1.5 mg by mouth Daily., Disp: , Rfl:   •  Dapagliflozin Propanediol (Farxiga) 10 MG tablet, Take 10 mg by mouth Daily., Disp: , Rfl:   •  dicyclomine (BENTYL) 20 MG tablet, Take 20 mg by mouth 3 (Three) Times a Day., Disp: , Rfl:   •  fenofibrate (TRICOR) 145 MG tablet, fenofibrate nanocrystallized 145 mg tablet  Take 1 tablet every day by oral route., Disp: , Rfl:   •  Fluticasone-Umeclidin-Vilant (Trelegy Ellipta) 100-62.5-25 MCG/INH inhaler, Trelegy Ellipta 100 mcg-62.5 mcg-25 mcg powder for inhalation  INHALE 1 PUFF BY MOUTH EVERY DAY, Disp: , Rfl:   •  furosemide (LASIX) 20 MG tablet, Take 40 mg by mouth Daily., Disp: , Rfl:   •  gabapentin (NEURONTIN) 300 MG capsule, Take 600 mg by mouth 3 (Three) Times a Day., Disp: , Rfl: 5  •  HYDROcodone-acetaminophen (NORCO)  MG per tablet, Take 1 tablet by mouth Every 8 (Eight) Hours As Needed., Disp: , Rfl:   •  Insulin Lispro (Admelog) 100 UNIT/ML injection, Inject  under the skin into the appropriate area as directed As Needed for High Blood Sugar. Sliding scale - Maximum 20 units daily, Disp: , Rfl:   •  insulin regular (HUMULIN R) 500 UNIT/ML CONCENTRATED injection, Inject 150 Units under the skin into the appropriate area as directed 2 (two) times a day. 150 units every am, 70 units every noon, 110 units qhs, Disp: , Rfl:   •  losartan (COZAAR) 25 MG tablet, Take 25 mg by mouth Daily., Disp: , Rfl:   •  ondansetron (ZOFRAN) 8 MG tablet, Take 8 mg by mouth Every 8  "(Eight) Hours As Needed for Nausea or Vomiting., Disp: , Rfl:   •  pantoprazole (PROTONIX) 40 MG EC tablet, Take  by mouth Daily., Disp: , Rfl: 5  •  potassium chloride (MICRO-K) 10 MEQ CR capsule, Take 10 mEq by mouth Daily., Disp: , Rfl:   •  propranolol (INDERAL) 60 MG tablet, 80 mg Daily., Disp: , Rfl:   •  rOPINIRole (REQUIP) 2 MG tablet, Take 2 mg by mouth Every Night., Disp: , Rfl:   •  sertraline (ZOLOFT) 100 MG tablet, Take 100 mg by mouth 2 (Two) Times a Day., Disp: , Rfl:   •  simvastatin (ZOCOR) 40 MG tablet, Take 80 mg by mouth Every Night., Disp: , Rfl:   •  SUMAtriptan (IMITREX) 100 MG tablet, Take 100 mg by mouth Every 2 (Two) Hours As Needed for Migraine. Take one tablet at onset of headache. May repeat dose one time in 2 hours if headache not relieved., Disp: , Rfl:   •  temazepam (RESTORIL) 30 MG capsule, Take 30 mg by mouth At Night As Needed for Sleep., Disp: , Rfl:   •  TiZANidine (ZANAFLEX) 4 MG capsule, Take 4 mg by mouth 3 (Three) Times a Day As Needed., Disp: , Rfl:   •  vitamin D (ERGOCALCIFEROL) 1.25 MG (32654 UT) capsule capsule, Take 50,000 Units by mouth 1 (One) Time Per Week., Disp: , Rfl:   •  glucose blood test strip, Accu-Chek Guide test strips, Disp: , Rfl:   •  Insulin Lispro, 0.5 Unit Dial, (HUMALOG) 100 UNIT/ML solution pen-injector, Admelog SoloStar U-100 Insulin lispro 100 unit/mL subcutaneous pen  INJECT PER SLIDING SCALE WITH A MAXIMUM OF 20 UNITS PER DAY, Disp: , Rfl:   •  Insulin Pen Needle 32G X 6 MM misc, BD Ultra-Fine Mini Pen Needle 31 gauge x 3/16\"  USE TWICE DAILY FOR INSULIN AS DIRECTED BY YOUR DOCTOR, Disp: , Rfl:     Social History     Socioeconomic History   • Marital status:    Tobacco Use   • Smoking status: Never Smoker   • Smokeless tobacco: Never Used   Vaping Use   • Vaping Use: Never used   Substance and Sexual Activity   • Alcohol use: Never   • Drug use: Never   • Sexual activity: Defer       Family History   Problem Relation Age of Onset   • " Cancer Mother 60        Breast   • COPD Father 64   • Heart disease Father    • Obesity Maternal Grandmother          Review of Systems:  Review of Systems   Constitutional:        Insomnia, fatigue   HENT:        Wears contacts/ glasses, dentures, allergies, hearing problems   Respiratory:        Asthma, CPAP, PE, sleep apnea, on CPAP, snoring   Cardiovascular:        CHF, HLD, HTN   Gastrointestinal:        GERD, IBS   Endocrine:        DMII,    Genitourinary:        Renal insufficiency    Musculoskeletal:        Plantar fasciitis, fibromyalgia, neck pain, back pain   Skin: Negative.    Neurological:        Restless leg syndrome   Hematological: Negative.    Psychiatric/Behavioral:        Depression and anxiety, currently taking medications for psychiatric problems or for depression        Physical Exam:  Vital Signs:  Weight: 124 kg (272 lb 9.6 oz)   Body mass index is 50.67 kg/m².      Heart Rate: 80   BP: 144/80     Physical Exam  Constitutional:       Appearance: Normal appearance. She is obese.   Cardiovascular:      Pulses: Normal pulses.   Pulmonary:      Effort: Pulmonary effort is normal.      Breath sounds: Normal breath sounds.   Abdominal:      General: Abdomen is flat. Bowel sounds are normal.      Palpations: Abdomen is soft.   Skin:     General: Skin is warm and dry.   Neurological:      General: No focal deficit present.      Mental Status: She is alert and oriented to person, place, and time.   Psychiatric:         Mood and Affect: Mood normal.         Behavior: Behavior normal.         Thought Content: Thought content normal.         Judgment: Judgment normal.            Assessment:         Gissel Amaro is a 51 y.o. year old female with medically complicated severe obesity. Weight: 124 kg (272 lb 9.6 oz), Body mass index is 50.67 kg/m². and weight related problems.    I explained in detail the procedures that we are performing.  All of those procedures can be performed laparoscopically but  there is a chance to convert to open if any technical challenges or complications do occur.  Bariatric surgery is not cosmetic surgery but rather a tool to help a patient make a life-long commitment lifestyle changes including diet, exercise, behavior changes, and taking supplemental vitamins and minerals.    Due to the patient's BMI and co-morbidities they are at a high risk for surgery and will obtain the following:  The patient has been advised that a letter of medical support and a history and physical must be obtained from her primary care physician. A psychological evaluation will be arranged for this patient. CBC, CMP, FLP, TSH and HgbA1C will be drawn. Gissel Pandyaron will obtain a pre-operative CXR and EKG. Gissel Pandyaron will obtain clearance from a cardiologist prior to surgery. Pt will also need pulmonary clearances due to insurance.     Gissel [facility] [place] will be set up for a pre-operative diagnostic esophagogastroduodenoscopy with biopsy for evaluation. The risks and benefits of the procedure were discussed with the patient in detail and all questions were answered.  Possibility of perforation, bleeding, aspiration, anoxic brain injury, respiratory and/or cardiac arrest and death were discussed.   She received handouts regarding, all questions were answered and informed consent was obtained.     The risks, benefits, alternatives, and potential complications of all of the procedures were explained in detail including, but not limited to death, anesthesia and medication adverse effect/DVT, pulmonary embolism, trocar site/incisional hernia, wound infection, abdominal infection, bleeding, failure to lose weight or gain weight and change in body image, metabolic complications with calcium, thiamine, vitamin B12, folate, iron, and anemia.    The patient was advised to start a high protein, low fat and low carbohydrate diet. The patient was given individualized information by our dietician along with general group  information and handouts.       The patient was encouraged to start routine exercise including but not limited to 150 minutes per week. The patient received a resistance band along with a handout of exercises.     The consultation plan was reviewed with the patient.    The patient understands the surgical procedures and the different surgical options that are available.  She understands the lifestyle changes that would be required after surgery and has agreed to participate in a pre-operative and postoperative weight management program.  She also expressed understanding of possible risks, had several questions answered and desires to proceed.    I think she is a good candidate for this surgery, and is interested in a sleeve gastrectomy.    Encounter Diagnosis   Name Primary?   • Obesity, Class III, BMI 40-49.9 (morbid obesity) (HCC) Yes       Plan:    Patient will have evaluations and follow up with bariatric dieticians and a psychologist before undergoing a multidisciplinary review of her candidacy.  We also discussed the weight loss requirement and rationale, and other program requirements.    Pt will need cardiac and pulmonary clearances prior to bariatric surgery. Pt underwent and EGD in Nov 2021 [facility] with GSI due to dysphagia. Will obtain op note for Dr. Marinelli to review. She had a sleep study ( home around 2 years ago). Plan to follow up in 1 month for next SWL visit.     Total time spent with pt 60 minutes of which 45 minutes were spent on education.       Carol Porter, APRN  4/26/2022

## 2022-04-26 NOTE — PROGRESS NOTES
Chief Complaint   Patient presents with   • Obesity   • Nutrition Counseling       The patient is here for first month of supervised weight loss visit.     24 hour recall:  Breakfast: eggs with cheese  Lunch: skips  Supper: chicken/green beans/potatoes  Snacks: doesn't snack  Beverages: water     PA: walking 15-20 minutes daily    Vitals:        BP:  144/80   Pulse:  80      Patient Active Problem List   Diagnosis   • Obesity          Body mass index is  kg/m².  Vitals       Documented weight        Weight:  272 lb        Discussion/Plan:  Obesity/Morbid Obesity: I reviewed the appropriate dietary choices with the patient and encouraged the necessary changes. Recommended at least 60 grams of protein per day, around 33 grams of fats and less than 100 grams of carbohydrates. Reviewed calorie intake if patient wanted to calorie count and/or had BMR. Instructed patient to drink 64 ounces of water per day and exercise a minimum of 150 minutes per week including both cardio and strength training.  Discussed with pt also eating and drinking separately stopping 30 minutes before meals and 45 minutes after meals. Discussed the option of keeping a food journal which will help patient become more aware of the nutritional value of foods so they are more prepared after surgery.     The patient was given written materials from our office for education.     I answered all of the patients questions regarding dietary changes, exercise or surgical options.     PES: Food and nutrition related knowledge deficit RT uncertainty how to apply nutrition information AEB needed education for guidelines for bariatric surgery.       The total time spent face to face was 15 minutes with 15 minutes spent counseling.           GOALS:   Eat something high in protien 3 x daily      Eat/drink seperately with 1 meal per day

## 2022-04-26 NOTE — PROGRESS NOTES
Subjective   Gissel Amaro is a 51 y.o.y.o. female who presents today for psych eval for bariatric procedure     Chief Complaint:    Pre OP Evaluation     History of Present Illness:   The pt has a hx of depression since she was 14 yo , currently  on meds, mood is stable now, depression is rated as 4/10    Anxiety is manageable now     No hx of  Eating d/o, no binge eating,   Night eating - sometimes snacking        The pt suffered from excessive weight for the last 15 years , she was on steroids       This pt had appropriate reasons for seeking bariatric surgery including health issues   The pt also hopes to increase activity level with significant weight loss     The pt reported multiple weight loss attempts including  Atkins, slim fast  , calorie counts, no diet pills     The most successful attempt was losing 15 lbs and all past weight loss attempts have only provided temporary relief   The pt denied difficulties perceiving weight loss in the past     Healthy eating habits include 2 full  meals per day, eggs , yogurt, chicken, lean protein  , vegetables       Maladaptive eating habits include  occasional fast food ( but tried to make heathy choices ) , snacking when tired, or stressed     Currently 270 lbs ,      highest weight  279   lbs      BMI  49.4      The pt wants to get sleeve     The following portions of the patient's history were reviewed and updated as appropriate: allergies, current medications, past family history, past medical history, past social history, past surgical history and problem list.    Past Medical History:   Diagnosis Date   • Anxiety    • Asthma    • CAD (coronary artery disease)    • Depression    • Diabetes (HCC)    • MAX (dyspnea on exertion)    • Fibromyalgia    • GERD (gastroesophageal reflux disease)    • HTN (hypertension)    • Hyperlipemia    • IBS (irritable bowel syndrome)    • Plantar fasciitis          Social History     Socioeconomic History   • Marital status:     Tobacco Use   • Smoking status: Never Smoker   • Smokeless tobacco: Never Used   Vaping Use   • Vaping Use: Never used   Substance and Sexual Activity   • Alcohol use: Never   • Drug use: Never   • Sexual activity: Defer   the pt lives with maira and her daughter , who are supportive   Her maira is also overweight   No hx of abuse   Retired     Family History   Problem Relation Age of Onset   • Cancer Mother 60        Breast   • COPD Father 64   • Heart disease Father    • Obesity Maternal Grandmother        Past Surgical History:   Procedure Laterality Date   • BREAST SURGERY Bilateral     2015, 2020   • CARDIAC CATHETERIZATION     • COLONOSCOPY  2017   • ENDOSCOPY  2021   • GALLBLADDER SURGERY  2010   • HYSTERECTOMY  2013   • KNEE SURGERY Right 2017   • LAPAROSCOPIC TUBAL LIGATION  2000   • TONSILLECTOMY  1976       Patient Active Problem List   Diagnosis   • Benign hypertension   • Small vessel coronary artery disease   • Diabetes mellitus (HCC)   • Edema   • Mixed hyperlipidemia   • CHF (congestive heart failure) (HCC)   • Morbid obesity (HCC)   • Obstructive sleep apnea syndrome   • BMI 45.0-49.9, adult (HCC)   • Pre-operative examination         Allergies   Allergen Reactions   • Penicillins Unknown (See Comments)   • Trulicity [Dulaglutide] Other (See Comments)     Caused pancreatitis         Current Outpatient Medications:   •  albuterol sulfate HFA (VENTOLIN HFA) 108 (90 Base) MCG/ACT inhaler, VENTOLIN  (90 Base) MCG/ACT AERS, Disp: , Rfl:   •  amitriptyline (ELAVIL) 25 MG tablet, Take 25 mg by mouth Every Night., Disp: , Rfl:   •  Cariprazine HCl (Vraylar) 1.5 MG capsule capsule, Take 1.5 mg by mouth Daily., Disp: , Rfl:   •  fenofibrate (TRICOR) 145 MG tablet, fenofibrate nanocrystallized 145 mg tablet  Take 1 tablet every day by oral route., Disp: , Rfl:   •  furosemide (LASIX) 20 MG tablet, Take 20 mg by mouth Daily., Disp: , Rfl:   •  gabapentin (NEURONTIN) 300 MG capsule, TK 1 C PO TID,  Disp: , Rfl: 5  •  HYDROcodone-acetaminophen (NORCO)  MG per tablet, hydrocodone 10 mg-acetaminophen 325 mg tablet, Disp: , Rfl:   •  insulin regular (HUMULIN R) 500 UNIT/ML CONCENTRATED injection, Inject 150 Units under the skin into the appropriate area as directed 2 (two) times a day., Disp: , Rfl:   •  pantoprazole (PROTONIX) 40 MG EC tablet, Take  by mouth Daily., Disp: , Rfl: 5  •  potassium chloride (MICRO-K) 10 MEQ CR capsule, Take 10 mEq by mouth Daily., Disp: , Rfl:   •  propranolol (INDERAL) 60 MG tablet, 80 mg Daily., Disp: , Rfl:   •  rOPINIRole (REQUIP) 2 MG tablet, Take 2 mg by mouth Every Night., Disp: , Rfl:   •  sertraline (ZOLOFT) 100 MG tablet, SERTRALINE  MG TABS, Disp: , Rfl:   •  simvastatin (ZOCOR) 40 MG tablet, SIMVASTATIN 40 MG TABS, Disp: , Rfl:   •  TiZANidine (ZANAFLEX) 4 MG capsule, TIZANIDINE HCL 4 MG CAPS, Disp: , Rfl:   •  vitamin D (ERGOCALCIFEROL) 1.25 MG (45715 UT) capsule capsule, Take 50,000 Units by mouth 1 (One) Time Per Week., Disp: , Rfl:     PAST PSYCHIATRIC HISTORY  No inpt , no SI/SA     PAST OUTPATIENT TREATMENT  Diagnosis treated:  Depression , treated by PCP     Treatment Type:  meds   Prior Psychiatric Medications:  Sertraline and vraylar   Support Groups:  None   Sequelae Of Mental Disorder:  Emotional distress      Psychological ROS: positive for - anxiety  negative for - hallucinations, hostility, irritability, memory difficulties, mood swings, obsessive thoughts, physical abuse, sexual abuse, sleep disturbances or suicidal ideation     Mental Status Exam:    Hygiene:   good  Cooperation:  Cooperative  Eye Contact:  Good  Psychomotor Behavior:  Appropriate  Affect:  Full range and Appropriate  Hopelessness: Denies  Speech:  Normal  Goal directed and Linear  Thought Content:  Mood congruent  Suicidal:  None  Homicidal:  None  Hallucinations:  None  Delusion:  None  Memory:  Intact  Orientation:  Person, Place, Time and Situation  Reliability:   good  Insight:  Good  Judgement:  Good  Impulse Control:  Fair        Never smoker      Diagnoses and all orders for this visit:    1. Morbid obesity (HCC) (Primary)    2. BMI 45.0-49.9, adult (HCC)    3. Pre-operative examination         Diagnosis Plan   1. Morbid obesity (HCC)     2. BMI 45.0-49.9, adult (HCC)     3. Pre-operative examination           TREATMENT PLAN/GOALS:   No contraindications for bariatric procedure   No hx of schizophrenia. No schizoaffective d/o. No hx of SAs  Depression is Stable on sertraline and vraylar     Continue supportive psychotherapy efforts and medications as indicated. Treatment and medication options discussed during today's visit. Patient ackowledged and verbally consented to continue with current treatment plan and was educated on the importance of compliance with treatment and follow-up appointments.    MEDICATION ISSUES: meds were not prescribed during this visit     No f/u planned   PHQ-9 Depression Screening  Little interest or pleasure in doing things? 2-->more than half the days   Feeling down, depressed, or hopeless? 1-->several days   Trouble falling or staying asleep, or sleeping too much? 0-->not at all   Feeling tired or having little energy? 1-->several days   Poor appetite or overeating? 1-->several days   Feeling bad about yourself - or that you are a failure or have let yourself or your family down? 1-->several days   Trouble concentrating on things, such as reading the newspaper or watching television? 0-->not at all   Moving or speaking so slowly that other people could have noticed? Or the opposite - being so fidgety or restless that you have been moving around a lot more than usual? 0-->not at all   Thoughts that you would be better off dead, or of hurting yourself in some way? 0-->not at all   PHQ-9 Total Score 6   If you checked off any problems, how difficult have these problems made it for you to do your work, take care of things at home, or get along  with other people? somewhat difficult     TANIKA 7 scored 6          This document has been electronically signed by Angelica Jefferson MD  04/26/2022

## 2022-04-27 ENCOUNTER — PREP FOR SURGERY (OUTPATIENT)
Dept: OTHER | Facility: HOSPITAL | Age: 52
End: 2022-04-27

## 2022-04-28 ENCOUNTER — PATIENT ROUNDING (BHMG ONLY) (OUTPATIENT)
Dept: BARIATRICS/WEIGHT MGMT | Facility: CLINIC | Age: 52
End: 2022-04-28

## 2022-04-28 NOTE — PROGRESS NOTES
April 28, 2022    Hello, may I speak with Gissel Amaro?    My name isAMY    I am  with [facility]  [address] [place] [address].    Before we get started may I verify your date of birth? 1970    I am calling to officially welcome you to our practice and ask about your recent visit. Is this a good time to talk? yes    Tell me about your visit with us. What things went well?  Everything went very well. I have been reading the binder for references. Its great!~        We're always looking for ways to make our patients' experiences even better. Do you have recommendations on ways we may improve?  no    Overall were you satisfied with your first visit to our practice? yes       I appreciate you taking the time to speak with me today. Is there anything else I can do for you? no      Thank you, and have a great day.

## 2022-04-29 ENCOUNTER — OFFICE (AMBULATORY)
Dept: URBAN - METROPOLITAN AREA CLINIC 64 | Facility: CLINIC | Age: 52
End: 2022-04-29

## 2022-04-29 VITALS
WEIGHT: 275 LBS | HEIGHT: 62 IN | HEART RATE: 93 BPM | SYSTOLIC BLOOD PRESSURE: 145 MMHG | DIASTOLIC BLOOD PRESSURE: 94 MMHG

## 2022-04-29 DIAGNOSIS — R10.84 GENERALIZED ABDOMINAL PAIN: ICD-10-CM

## 2022-04-29 PROCEDURE — 99213 OFFICE O/P EST LOW 20 MIN: CPT | Performed by: NURSE PRACTITIONER

## 2022-05-18 ENCOUNTER — OFFICE VISIT (OUTPATIENT)
Dept: BARIATRICS/WEIGHT MGMT | Facility: CLINIC | Age: 52
End: 2022-05-18

## 2022-05-18 VITALS
OXYGEN SATURATION: 97 % | DIASTOLIC BLOOD PRESSURE: 81 MMHG | HEART RATE: 79 BPM | BODY MASS INDEX: 49.5 KG/M2 | RESPIRATION RATE: 16 BRPM | HEIGHT: 62 IN | WEIGHT: 269 LBS | SYSTOLIC BLOOD PRESSURE: 178 MMHG

## 2022-05-18 DIAGNOSIS — Z71.3 NUTRITIONAL COUNSELING: ICD-10-CM

## 2022-05-18 DIAGNOSIS — E66.01 OBESITY, CLASS III, BMI 40-49.9 (MORBID OBESITY): Primary | ICD-10-CM

## 2022-05-18 PROCEDURE — 99213 OFFICE O/P EST LOW 20 MIN: CPT | Performed by: NURSE PRACTITIONER

## 2022-05-18 RX ORDER — GABAPENTIN 600 MG/1
600 TABLET ORAL 3 TIMES DAILY PRN
COMMUNITY
Start: 2022-05-10

## 2022-05-18 RX ORDER — PROPRANOLOL HYDROCHLORIDE 80 MG/1
80 TABLET ORAL DAILY
COMMUNITY

## 2022-05-18 NOTE — PROGRESS NOTES
MGK BAR SURG [facility] GROUP BARIATRIC SURGERY  [address] [place] [address]  [address] [place] [address]  Dept: [phone]  5/18/2022      Gissel Amaro.  [id number]  [id number]  1970  female      Chief Complaint   Patient presents with   • Nutrition Counseling     SWL #2/6       The patient is here for month 2 of their pre-operative physician supervised diet. She had a loss of 3 lbs. The patient states that she is following the recommendations given by our office and dietician including a high lean protein, low carb and low fat diet. We recommended adequate fruits and vegetable intake along with limited portion sizes. Patient is working on eliminating fast foods, fried foods, sweets and soda. Gissel Amaro has been increasing her daily water intake. She has been exercising: just got 1st steroid shot in back, getting a bowflex machine.    Patient states they have made positive changes including no carbonation,   The patient admits to be struggling with eating something high protein 3 times a day,     Breakfast: none usually   Lunch: chicken , veggies   Dinner: similar to lunch , baked veggies   Snacks: none usually, peanut butter or juice at night   Drinks: no carbonation, juice, soda 1 time a week   Exercise: just got 1st steroid shot in back, just got bowflex machine     Past goals:  Eat something high in protien 3 x daily- not met       Eat/drink seperately with 1 meal per day- partially met       Review of Systems   Constitutional: Negative.    Respiratory: Negative.    Cardiovascular: Negative.    Gastrointestinal: Negative.    Musculoskeletal: Positive for back pain.     Vitals:    05/18/22 1417   BP: 178/81   Pulse: 79   Resp: 16   SpO2: 97%     Patient Active Problem List   Diagnosis   • Benign hypertension   • Small vessel coronary artery disease   • Diabetes mellitus (HCC)   • Edema   • Mixed hyperlipidemia   • CHF (congestive heart  failure) (Formerly [facility])   • Morbid obesity (HCC)   • Obstructive sleep apnea syndrome   • BMI 45.0-49.9, adult (Formerly [facility])   • Pre-operative examination     Body mass index is 50 kg/m².    The following portions of the patient's history were reviewed and updated as appropriate: active problem list, medication list, allergies, social history, notes from last encounter    Physical Exam  Constitutional:       Appearance: Normal appearance. She is obese.   Cardiovascular:      Rate and Rhythm: Normal rate and regular rhythm.   Pulmonary:      Effort: Pulmonary effort is normal.      Breath sounds: Normal breath sounds.   Abdominal:      General: Abdomen is flat. Bowel sounds are normal.      Palpations: Abdomen is soft.   Skin:     General: Skin is warm and dry.   Neurological:      General: No focal deficit present.      Mental Status: She is alert and oriented to person, place, and time.   Psychiatric:         Mood and Affect: Mood normal.         Behavior: Behavior normal.         Thought Content: Thought content normal.         Judgment: Judgment normal.         Discussion/Plan:    New goals:  Eating and drinking separately with 1 meal a day  Try protein shake for breakfast, before bed to help with hypoglycemia during night  Food logs     Obesity/Morbid Obesity: Currently the patient's weight is decreased. There are no medications prescribed.Treatment plan includes prescribed diet, prescribed exercise regimen and behavior modification.    I reviewed the appropriate dietary choices with the patient and encouraged the necessary changes. Recommended at least 70 grams of protein per day, around 35 grams of fats and less than 100 grams of carbohydrates. Reviewed calorie intake if patient wanted to calorie count and/or had BMR. Instructed patient to drink half of body weight in ounces per day and exercise a minimum of 150 minutes per week including both cardio and strength training. Discussed the option of keeping a food journal which  will help patient become more aware of the nutritional value of foods so they are more prepared after surgery.    The patient was given written materials from our office for education.   I answered all of the patients questions regarding dietary changes, exercise or surgical options.  The patient will follow up in 1 month. The total time spent face to face was 20 minutes with 15 minutes spent counseling.    Carol Porter [facility]

## 2022-05-19 ENCOUNTER — TELEPHONE (OUTPATIENT)
Dept: BARIATRICS/WEIGHT MGMT | Facility: CLINIC | Age: 52
End: 2022-05-19

## 2022-05-19 DIAGNOSIS — R13.10 DYSPHAGIA, UNSPECIFIED TYPE: Primary | ICD-10-CM

## 2022-05-23 ENCOUNTER — TRANSCRIBE ORDERS (OUTPATIENT)
Dept: ADMINISTRATIVE | Facility: HOSPITAL | Age: 52
End: 2022-05-23

## 2022-05-23 DIAGNOSIS — R06.02 SHORTNESS OF BREATH: Primary | ICD-10-CM

## 2022-05-23 DIAGNOSIS — Z01.818 OTHER SPECIFIED PRE-OPERATIVE EXAMINATION: ICD-10-CM

## 2022-05-24 ENCOUNTER — HOSPITAL ENCOUNTER (OUTPATIENT)
Dept: GENERAL RADIOLOGY | Facility: HOSPITAL | Age: 52
Discharge: HOME | End: 2022-05-24

## 2022-05-24 ENCOUNTER — LAB (OUTPATIENT)
Dept: LAB | Facility: HOSPITAL | Age: 52
End: 2022-05-24

## 2022-05-24 DIAGNOSIS — R13.10 DYSPHAGIA, UNSPECIFIED TYPE: ICD-10-CM

## 2022-05-24 DIAGNOSIS — E66.01 OBESITY, CLASS III, BMI 40-49.9 (MORBID OBESITY): ICD-10-CM

## 2022-05-24 LAB
25(OH)D3 SERPL-MCNC: 30 NG/ML (ref 30–100)
ALBUMIN SERPL-MCNC: 4.1 G/DL (ref 3.5–5.2)
ALBUMIN/GLOB SERPL: 1.3 G/DL
ALP SERPL-CCNC: 77 U/L (ref 39–117)
ALT SERPL W P-5'-P-CCNC: 16 U/L (ref 1–33)
AMPHET+METHAMPHET UR QL: NEGATIVE
ANION GAP SERPL CALCULATED.3IONS-SCNC: 10.2 MMOL/L (ref 5–15)
ARTERIAL PATENCY WRIST A: POSITIVE
AST SERPL-CCNC: 14 U/L (ref 1–32)
ATMOSPHERIC PRESS: NORMAL MM[HG]
BARBITURATES UR QL SCN: NEGATIVE
BASE EXCESS BLDA CALC-SCNC: 0.5 MMOL/L (ref 0–3)
BASOPHILS # BLD AUTO: 0.05 10*3/MM3 (ref 0–0.2)
BASOPHILS NFR BLD AUTO: 0.7 % (ref 0–1.5)
BDY SITE: NORMAL
BENZODIAZ UR QL SCN: POSITIVE
BILIRUB SERPL-MCNC: 0.5 MG/DL (ref 0–1.2)
BUN SERPL-MCNC: 29 MG/DL (ref 6–20)
BUN/CREAT SERPL: 14.1 (ref 7–25)
CALCIUM SPEC-SCNC: 9.7 MG/DL (ref 8.6–10.5)
CANNABINOIDS SERPL QL: NEGATIVE
CHLORIDE SERPL-SCNC: 106 MMOL/L (ref 98–107)
CHOLEST SERPL-MCNC: 233 MG/DL (ref 0–200)
CO2 BLDA-SCNC: 27.6 MMOL/L (ref 22–29)
CO2 SERPL-SCNC: 24.8 MMOL/L (ref 22–29)
COCAINE UR QL: NEGATIVE
CREAT SERPL-MCNC: 2.05 MG/DL (ref 0.57–1)
DEPRECATED RDW RBC AUTO: 46.9 FL (ref 37–54)
EGFRCR SERPLBLD CKD-EPI 2021: 28.7 ML/MIN/1.73
EOSINOPHIL # BLD AUTO: 0.04 10*3/MM3 (ref 0–0.4)
EOSINOPHIL NFR BLD AUTO: 0.6 % (ref 0.3–6.2)
ERYTHROCYTE [DISTWIDTH] IN BLOOD BY AUTOMATED COUNT: 14.6 % (ref 12.3–15.4)
GLOBULIN UR ELPH-MCNC: 3.1 GM/DL
GLUCOSE SERPL-MCNC: 160 MG/DL (ref 65–99)
HBA1C MFR BLD: 7.2 % (ref 3.5–5.6)
HCO3 BLDA-SCNC: 26.2 MMOL/L (ref 21–28)
HCT VFR BLD AUTO: 44.1 % (ref 34–46.6)
HDLC SERPL-MCNC: 50 MG/DL (ref 40–60)
HEMODILUTION: NO
HGB BLD-MCNC: 13.8 G/DL (ref 12–15.9)
IMM GRANULOCYTES # BLD AUTO: 0.05 10*3/MM3 (ref 0–0.05)
IMM GRANULOCYTES NFR BLD AUTO: 0.7 % (ref 0–0.5)
INHALED O2 CONCENTRATION: 21 %
LDLC SERPL CALC-MCNC: 135 MG/DL (ref 0–100)
LDLC/HDLC SERPL: 2.58 {RATIO}
LYMPHOCYTES # BLD AUTO: 2.8 10*3/MM3 (ref 0.7–3.1)
LYMPHOCYTES NFR BLD AUTO: 39.9 % (ref 19.6–45.3)
MCH RBC QN AUTO: 27.3 PG (ref 26.6–33)
MCHC RBC AUTO-ENTMCNC: 31.3 G/DL (ref 31.5–35.7)
MCV RBC AUTO: 87.2 FL (ref 79–97)
METHADONE UR QL SCN: NEGATIVE
MODALITY: NORMAL
MONOCYTES # BLD AUTO: 0.6 10*3/MM3 (ref 0.1–0.9)
MONOCYTES NFR BLD AUTO: 8.5 % (ref 5–12)
NEUTROPHILS NFR BLD AUTO: 3.48 10*3/MM3 (ref 1.7–7)
NEUTROPHILS NFR BLD AUTO: 49.6 % (ref 42.7–76)
NRBC BLD AUTO-RTO: 0 /100 WBC (ref 0–0.2)
OPIATES UR QL: NEGATIVE
OXYCODONE UR QL SCN: NEGATIVE
PCO2 BLDA: 45.1 MM HG (ref 35–48)
PH BLDA: 7.37 PH UNITS (ref 7.35–7.45)
PLATELET # BLD AUTO: 146 10*3/MM3 (ref 140–450)
PMV BLD AUTO: 10.1 FL (ref 6–12)
PO2 BLDA: 83.2 MM HG (ref 83–108)
POTASSIUM SERPL-SCNC: 4.6 MMOL/L (ref 3.5–5.2)
PROT SERPL-MCNC: 7.2 G/DL (ref 6–8.5)
RBC # BLD AUTO: 5.06 10*6/MM3 (ref 3.77–5.28)
SAO2 % BLDCOA: 95.8 % (ref 94–98)
SODIUM SERPL-SCNC: 141 MMOL/L (ref 136–145)
TRIGL SERPL-MCNC: 271 MG/DL (ref 0–150)
TSH SERPL DL<=0.05 MIU/L-ACNC: 1.96 UIU/ML (ref 0.27–4.2)
VLDLC SERPL-MCNC: 48 MG/DL (ref 5–40)
WBC NRBC COR # BLD: 7.02 10*3/MM3 (ref 3.4–10.8)

## 2022-05-24 PROCEDURE — 80307 DRUG TEST PRSMV CHEM ANLYZR: CPT

## 2022-05-24 PROCEDURE — 84425 ASSAY OF VITAMIN B-1: CPT

## 2022-05-24 PROCEDURE — 80305 DRUG TEST PRSMV DIR OPT OBS: CPT

## 2022-05-24 PROCEDURE — 36415 COLL VENOUS BLD VENIPUNCTURE: CPT

## 2022-05-24 PROCEDURE — 82803 BLOOD GASES ANY COMBINATION: CPT

## 2022-05-24 PROCEDURE — 36600 WITHDRAWAL OF ARTERIAL BLOOD: CPT

## 2022-05-24 PROCEDURE — 82306 VITAMIN D 25 HYDROXY: CPT

## 2022-05-24 PROCEDURE — 74240 X-RAY XM UPR GI TRC 1CNTRST: CPT

## 2022-05-24 PROCEDURE — 80061 LIPID PANEL: CPT

## 2022-05-24 PROCEDURE — 83036 HEMOGLOBIN GLYCOSYLATED A1C: CPT

## 2022-05-24 PROCEDURE — 80050 GENERAL HEALTH PANEL: CPT

## 2022-05-25 LAB
COTININE UR QL SCN: NEGATIVE NG/ML
Lab: NORMAL

## 2022-05-31 ENCOUNTER — OFFICE VISIT (OUTPATIENT)
Dept: CARDIOLOGY | Facility: CLINIC | Age: 52
End: 2022-05-31

## 2022-05-31 VITALS
OXYGEN SATURATION: 96 % | SYSTOLIC BLOOD PRESSURE: 151 MMHG | BODY MASS INDEX: 50.6 KG/M2 | HEIGHT: 61 IN | WEIGHT: 268 LBS | HEART RATE: 80 BPM | DIASTOLIC BLOOD PRESSURE: 92 MMHG

## 2022-05-31 DIAGNOSIS — I25.10 SMALL VESSEL CORONARY ARTERY DISEASE: Primary | ICD-10-CM

## 2022-05-31 DIAGNOSIS — E78.2 MIXED HYPERLIPIDEMIA: ICD-10-CM

## 2022-05-31 PROCEDURE — 93000 ELECTROCARDIOGRAM COMPLETE: CPT | Performed by: NURSE PRACTITIONER

## 2022-05-31 PROCEDURE — 99213 OFFICE O/P EST LOW 20 MIN: CPT | Performed by: NURSE PRACTITIONER

## 2022-05-31 RX ORDER — ASPIRIN 81 MG/1
81 TABLET ORAL DAILY
COMMUNITY
Start: 2022-05-31 | End: 2022-10-20 | Stop reason: HOSPADM

## 2022-06-01 LAB — VIT B1 BLD-SCNC: 198.9 NMOL/L (ref 66.5–200)

## 2022-06-15 ENCOUNTER — ON CAMPUS - OUTPATIENT (AMBULATORY)
Dept: URBAN - METROPOLITAN AREA HOSPITAL 77 | Facility: HOSPITAL | Age: 52
End: 2022-06-15

## 2022-06-15 DIAGNOSIS — Z86.010 PERSONAL HISTORY OF COLONIC POLYPS: ICD-10-CM

## 2022-06-15 DIAGNOSIS — D12.3 BENIGN NEOPLASM OF TRANSVERSE COLON: ICD-10-CM

## 2022-06-15 PROCEDURE — 45385 COLONOSCOPY W/LESION REMOVAL: CPT | Performed by: INTERNAL MEDICINE

## 2022-06-17 ENCOUNTER — OFFICE VISIT (OUTPATIENT)
Dept: BARIATRICS/WEIGHT MGMT | Facility: CLINIC | Age: 52
End: 2022-06-17

## 2022-06-17 VITALS
HEIGHT: 61 IN | OXYGEN SATURATION: 95 % | RESPIRATION RATE: 16 BRPM | HEART RATE: 62 BPM | BODY MASS INDEX: 51.05 KG/M2 | WEIGHT: 270.4 LBS

## 2022-06-17 DIAGNOSIS — E66.01 CLASS 3 OBESITY: Primary | ICD-10-CM

## 2022-06-17 DIAGNOSIS — Z71.3 NUTRITIONAL COUNSELING: ICD-10-CM

## 2022-06-17 PROCEDURE — 99214 OFFICE O/P EST MOD 30 MIN: CPT | Performed by: NURSE PRACTITIONER

## 2022-06-17 RX ORDER — BUSPIRONE HYDROCHLORIDE 5 MG/1
5 TABLET ORAL DAILY
COMMUNITY
End: 2022-09-30

## 2022-06-17 NOTE — PROGRESS NOTES
[facility] BAR SURG [facility] BARIATRIC SURGERY  [address] [place] [address]  [address] [place] [address]  Dept: [phone]  6/17/2022      Gissel Amaro.  [id number]  [id number]  1970  female      SWL # 3    The patient is here for month 3 of their pre-operative physician supervised diet. She had a gain of 1 lbs. The patient states that she is following the recommendations given by our office and dietician including a high lean protein, low carb and low fat diet. We recommended adequate fruits and vegetable intake along with limited portion sizes. Patient is working on eliminating fast foods, fried foods, sweets and soda. Gissel Amaro has been increasing her daily water intake. She has been exercising: exercise bike 10 minutes a day.    Patient states they have made positive changes including increased protein, protein shake  The patient admits to be struggling with none       Breakfast: protein shake, strawberries   Lunch: chicken , chicken salad,   Dinner: grilled veggie tray with chicken or tuna , eating out prn - steak, baked potato and salad   Snacks: none usually   Drinks: protein shake, water, propel, no carbonation   Exercise: exercise bike 10 minutes a day       Past goals:  Eating and drinking separately with 1 meal a day - met   Try protein shake for breakfast, before bed to help with hypoglycemia during night- met for breakfast   Food logs - met     Review of Systems   Constitutional: Positive for fatigue.   Respiratory: Negative.    Cardiovascular: Negative.    Gastrointestinal: Negative.    Musculoskeletal: Positive for arthralgias, back pain and myalgias.     Vitals:    06/17/22 1335   Pulse: 62   Resp: 16   SpO2: 95%     Patient Active Problem List   Diagnosis   • Benign hypertension   • Small vessel coronary artery disease   • Diabetes mellitus (HCC)   • Edema   • Mixed hyperlipidemia   • CHF (congestive heart failure) (HCC)   •  Morbid obesity (HCC)   • Obstructive sleep apnea syndrome   • BMI 45.0-49.9, adult (HCC)   • Pre-operative examination     Body mass index is 51.09 kg/m².    The following portions of the patient's history were reviewed and updated as appropriate: active problem list, medication list, allergies, social history, notes from last encounter    Physical Exam  Constitutional:       Appearance: Normal appearance. She is obese.   Cardiovascular:      Pulses: Normal pulses.   Pulmonary:      Effort: Pulmonary effort is normal.   Abdominal:      General: Abdomen is flat. Bowel sounds are normal.      Palpations: Abdomen is soft.   Skin:     General: Skin is warm and dry.   Neurological:      General: No focal deficit present.      Mental Status: She is alert and oriented to person, place, and time.   Psychiatric:         Mood and Affect: Mood normal.         Behavior: Behavior normal.         Thought Content: Thought content normal.         Judgment: Judgment normal.         Discussion/Plan:    New goals:  Exercise bike 10 minutes a day  Food logs    Obesity/Morbid Obesity: Currently the patient's weight is increased. There are no medications prescribed.Treatment plan includes prescribed diet, prescribed exercise regimen and behavior modification.    I reviewed the appropriate dietary choices with the patient and encouraged the necessary changes. Recommended at least 70 grams of protein per day, around 35 grams of fats and less than 100 grams of carbohydrates. Reviewed calorie intake if patient wanted to calorie count and/or had BMR. Instructed patient to drink half of body weight in ounces per day and exercise a minimum of 150 minutes per week including both cardio and strength training. Discussed the option of keeping a food journal which will help patient become more aware of the nutritional value of foods so they are more prepared after surgery.    The patient was given written materials from our office for education.   I  answered all of the patients questions regarding dietary changes, exercise or surgical options.  The patient will follow up in 1 month. The total time spent face to face was 30 minutes with 25 minutes spent counseling.    KAYLAH Parry  [facility]

## 2022-06-20 ENCOUNTER — APPOINTMENT (OUTPATIENT)
Dept: LAB | Facility: HOSPITAL | Age: 52
End: 2022-06-20

## 2022-06-22 ENCOUNTER — APPOINTMENT (OUTPATIENT)
Dept: RESPIRATORY THERAPY | Facility: HOSPITAL | Age: 52
End: 2022-06-22

## 2022-07-14 ENCOUNTER — OFFICE VISIT (OUTPATIENT)
Dept: BARIATRICS/WEIGHT MGMT | Facility: CLINIC | Age: 52
End: 2022-07-14

## 2022-07-14 VITALS
RESPIRATION RATE: 18 BRPM | DIASTOLIC BLOOD PRESSURE: 69 MMHG | OXYGEN SATURATION: 96 % | BODY MASS INDEX: 51.43 KG/M2 | WEIGHT: 272.4 LBS | HEART RATE: 69 BPM | HEIGHT: 61 IN | SYSTOLIC BLOOD PRESSURE: 136 MMHG

## 2022-07-14 DIAGNOSIS — E66.01 CLASS 3 OBESITY: Primary | ICD-10-CM

## 2022-07-14 DIAGNOSIS — Z71.3 NUTRITIONAL COUNSELING: ICD-10-CM

## 2022-07-14 PROCEDURE — 99213 OFFICE O/P EST LOW 20 MIN: CPT | Performed by: NURSE PRACTITIONER

## 2022-07-14 RX ORDER — FUROSEMIDE 40 MG/1
40 TABLET ORAL DAILY PRN
COMMUNITY
Start: 2022-06-23 | End: 2022-10-20 | Stop reason: HOSPADM

## 2022-07-14 RX ORDER — BUSPIRONE HYDROCHLORIDE 15 MG/1
15 TABLET ORAL AS NEEDED
COMMUNITY
Start: 2022-07-07

## 2022-07-14 NOTE — PROGRESS NOTES
[facility]K BAR SURG [facility] GROUP BARIATRIC SURGERY  [address] [place] [address]  [address] [place] [address]  Dept: [phone]  7/14/2022      Gissel Amaro.  [id number]  [id number]  1970  female      Chief Complaint   Patient presents with   • Nutrition Counseling     SWL #4/6       The patient is here for month 4 of their pre-operative physician supervised diet. She had a gain of 2 lbs. The patient states that she is following the recommendations given by our office and dietician including a high lean protein, low carb and low fat diet. We recommended adequate fruits and vegetable intake along with limited portion sizes. Patient is working on eliminating fast foods, fried foods, sweets and soda. Gissel Amaro has been increasing her daily water intake. She has been exercising: walking, swimming.    Patient states they have made positive changes including increased protein  The patient admits to be struggling with none usually     Breakfast: protein shake   Lunch: chicken or tuna,   Dinner: varies- chicken / fish , veggies , with starch or potatoes , chicken and dumplings   Snacks: none usually   Drinks: protein shake, water, propel water , no carbonation, lemonade, un sweet tea   Exercise: swimming , walking     Past goals:   Exercise bike 10 minutes a day- met used pool instead   Food logs- met     Review of Systems   Constitutional: Positive for fatigue.   Respiratory: Negative.    Cardiovascular: Negative.    Gastrointestinal: Negative.    Musculoskeletal: Positive for back pain.     Vitals:    07/14/22 1317   BP: 136/69   Pulse: 69   Resp: 18   SpO2: 96%     Patient Active Problem List   Diagnosis   • Benign hypertension   • Small vessel coronary artery disease   • Diabetes mellitus (HCC)   • Edema   • Mixed hyperlipidemia   • CHF (congestive heart failure) (HCC)   • Morbid obesity (HCC)   • Obstructive sleep apnea syndrome   • BMI 45.0-49.9,  adult (HCC)   • Pre-operative examination     Body mass index is 51.47 kg/m².    The following portions of the patient's history were reviewed and updated as appropriate: active problem list, medication list, allergies, social history, notes from last encounter    Physical Exam  Constitutional:       Appearance: Normal appearance. She is obese.   Cardiovascular:      Pulses: Normal pulses.   Pulmonary:      Effort: Pulmonary effort is normal.   Abdominal:      General: Abdomen is flat.      Palpations: Abdomen is soft.   Skin:     General: Skin is warm and dry.   Neurological:      General: No focal deficit present.      Mental Status: She is alert and oriented to person, place, and time.   Psychiatric:         Mood and Affect: Mood normal.         Behavior: Behavior normal.         Thought Content: Thought content normal.         Judgment: Judgment normal.         Discussion/Plan:    New goals:   Food logs   Eat protein first     Obesity/Morbid Obesity: Currently the patient's weight is increased. There are no medications prescribed.Treatment plan includes prescribed diet, prescribed exercise regimen and behavior modification.    I reviewed the appropriate dietary choices with the patient and encouraged the necessary changes. Recommended at least 70 grams of protein per day, around 35 grams of fats and less than 100 grams of carbohydrates. Reviewed calorie intake if patient wanted to calorie count and/or had BMR. Instructed patient to drink half of body weight in ounces per day and exercise a minimum of 150 minutes per week including both cardio and strength training. Discussed the option of keeping a food journal which will help patient become more aware of the nutritional value of foods so they are more prepared after surgery.    The patient was given written materials from our office for education.   I answered all of the patients questions regarding dietary changes, exercise or surgical options.  The patient  will follow up in 1 month. The total time spent face to face was 20 minutes with 15 minutes spent counseling.    Carol Porter APRBONITA  [facility] bariatrics

## 2022-07-20 ENCOUNTER — LAB (OUTPATIENT)
Dept: LAB | Facility: HOSPITAL | Age: 52
End: 2022-07-20

## 2022-07-20 DIAGNOSIS — Z01.818 OTHER SPECIFIED PRE-OPERATIVE EXAMINATION: ICD-10-CM

## 2022-07-20 LAB — SARS-COV-2 ORF1AB RESP QL NAA+PROBE: NOT DETECTED

## 2022-07-20 PROCEDURE — U0004 COV-19 TEST NON-CDC HGH THRU: HCPCS

## 2022-07-20 PROCEDURE — C9803 HOPD COVID-19 SPEC COLLECT: HCPCS

## 2022-07-21 ENCOUNTER — OFFICE (AMBULATORY)
Dept: URBAN - METROPOLITAN AREA CLINIC 64 | Facility: CLINIC | Age: 52
End: 2022-07-21
Payer: COMMERCIAL

## 2022-07-21 VITALS
RESPIRATION RATE: 18 BRPM | HEIGHT: 62 IN | WEIGHT: 277 LBS | HEART RATE: 112 BPM | DIASTOLIC BLOOD PRESSURE: 90 MMHG | SYSTOLIC BLOOD PRESSURE: 155 MMHG

## 2022-07-21 DIAGNOSIS — R13.10 DYSPHAGIA, UNSPECIFIED: ICD-10-CM

## 2022-07-21 PROCEDURE — 99214 OFFICE O/P EST MOD 30 MIN: CPT | Performed by: NURSE PRACTITIONER

## 2022-07-22 ENCOUNTER — HOSPITAL ENCOUNTER (OUTPATIENT)
Dept: RESPIRATORY THERAPY | Facility: HOSPITAL | Age: 52
Discharge: HOME | End: 2022-07-22
Admitting: INTERNAL MEDICINE

## 2022-07-22 DIAGNOSIS — R06.02 SHORTNESS OF BREATH: ICD-10-CM

## 2022-07-22 PROCEDURE — 94726 PLETHYSMOGRAPHY LUNG VOLUMES: CPT

## 2022-07-22 PROCEDURE — 94060 EVALUATION OF WHEEZING: CPT

## 2022-07-22 PROCEDURE — 94729 DIFFUSING CAPACITY: CPT

## 2022-07-22 RX ORDER — ALBUTEROL SULFATE 90 UG/1
2 AEROSOL, METERED RESPIRATORY (INHALATION) ONCE
Status: COMPLETED | OUTPATIENT
Start: 2022-07-22 | End: 2022-07-22

## 2022-07-22 RX ADMIN — ALBUTEROL SULFATE 2 PUFF: 108 INHALANT RESPIRATORY (INHALATION) at 13:23

## 2022-08-08 ENCOUNTER — OFFICE VISIT (OUTPATIENT)
Dept: BARIATRICS/WEIGHT MGMT | Facility: CLINIC | Age: 52
End: 2022-08-08

## 2022-08-08 VITALS
HEIGHT: 61 IN | OXYGEN SATURATION: 90 % | SYSTOLIC BLOOD PRESSURE: 160 MMHG | DIASTOLIC BLOOD PRESSURE: 68 MMHG | RESPIRATION RATE: 16 BRPM | BODY MASS INDEX: 51.96 KG/M2 | HEART RATE: 106 BPM | WEIGHT: 275.2 LBS

## 2022-08-08 DIAGNOSIS — E66.01 CLASS 3 OBESITY: Primary | ICD-10-CM

## 2022-08-08 DIAGNOSIS — Z71.3 NUTRITIONAL COUNSELING: ICD-10-CM

## 2022-08-08 PROCEDURE — 99213 OFFICE O/P EST LOW 20 MIN: CPT | Performed by: NURSE PRACTITIONER

## 2022-08-08 RX ORDER — DILTIAZEM HYDROCHLORIDE 120 MG/1
120 TABLET, EXTENDED RELEASE ORAL DAILY
COMMUNITY

## 2022-08-08 NOTE — PROGRESS NOTES
[facility]K BAR SURG [facility] BARIATRIC SURGERY  [address] [place] [address]  [address] [place] [address]  Dept: [phone]  8/8/2022      Gissel Amaro.  [id number]  [id number]  1970  female      SWL #5     The patient is here for month 5 of their pre-operative physician supervised diet. She had a gain of 3 lbs. The patient states that she is following the recommendations given by our office and dietician including a high lean protein, low carb and low fat diet. We recommended adequate fruits and vegetable intake along with limited portion sizes. Patient is working on eliminating fast foods, fried foods, sweets and soda. Gissel Amaro has been increasing her daily water intake. She has been exercising: stationary bike 1 time a day, 20 minutes at a time.    Patient states they have made positive changes including increased protein , no carbonation , increased exercise   The patient admits to be struggling with none     Sept 7th EGD with Dr. Rivers  - [facility]       Breakfast: daughter moved back home and now fixing breakfast, duncan, eggs   Lunch: chicken and veggies   Dinner: similar to lunch, with rice or potato   Snacks: none usually  Drinks: water, lemonade, propel water, no carbonation   Exercise: stationary bike - 1 time a day, 20 minutes at a time     Past goals:   Food logs -  Met   Eat protein first - met          Review of Systems   Constitutional: Positive for fatigue.   Respiratory: Positive for shortness of breath.    Cardiovascular: Negative.    Gastrointestinal: Negative.    Musculoskeletal: Positive for arthralgias, back pain and myalgias.     Vitals:    08/08/22 1029   BP: 160/68   Pulse: 106   Resp: 16   SpO2: 90%     Patient Active Problem List   Diagnosis   • Benign hypertension   • Small vessel coronary artery disease   • Diabetes mellitus (HCC)   • Edema   • Mixed hyperlipidemia   • CHF (congestive heart failure) (HCC)   • Morbid  obesity (HCC)   • Obstructive sleep apnea syndrome   • BMI 45.0-49.9, adult (HCC)   • Pre-operative examination     Body mass index is 52 kg/m².    The following portions of the patient's history were reviewed and updated as appropriate: active problem list, medication list, allergies, social history, notes from last encounter    Physical Exam  Constitutional:       Appearance: Normal appearance. She is obese.   Cardiovascular:      Pulses: Normal pulses.   Pulmonary:      Effort: Pulmonary effort is normal.      Breath sounds: Normal breath sounds.   Abdominal:      General: Abdomen is flat. Bowel sounds are normal.      Palpations: Abdomen is soft.   Skin:     General: Skin is warm and dry.   Neurological:      General: No focal deficit present.      Mental Status: She is alert and oriented to person, place, and time.   Psychiatric:         Mood and Affect: Mood normal.         Behavior: Behavior normal.         Thought Content: Thought content normal.         Judgment: Judgment normal.         Discussion/Plan:    New goals:   Using stationary bike 1 time a day for 30 minutes   Food logs   EGD on 9/7      Obesity/Morbid Obesity: Currently the patient's weight is increased. There are no medications prescribed.Treatment plan includes prescribed diet, prescribed exercise regimen and behavior modification.    I reviewed the appropriate dietary choices with the patient and encouraged the necessary changes. Recommended at least 70 grams of protein per day, around 35 grams of fats and less than 100 grams of carbohydrates. Reviewed calorie intake if patient wanted to calorie count and/or had BMR. Instructed patient to drink half of body weight in ounces per day and exercise a minimum of 150 minutes per week including both cardio and strength training. Discussed the option of keeping a food journal which will help patient become more aware of the nutritional value of foods so they are more prepared after surgery.    The  patient was given written materials from our office for education.   I answered all of the patients questions regarding dietary changes, exercise or surgical options.  The patient will follow up in 1 month. The total time spent face to face was 20 minutes with 15 minutes spent counseling.    KAYLAH Parry  [facility]

## 2022-09-01 ENCOUNTER — OFFICE VISIT (OUTPATIENT)
Dept: BARIATRICS/WEIGHT MGMT | Facility: CLINIC | Age: 52
End: 2022-09-01

## 2022-09-01 VITALS
RESPIRATION RATE: 16 BRPM | SYSTOLIC BLOOD PRESSURE: 129 MMHG | DIASTOLIC BLOOD PRESSURE: 77 MMHG | HEART RATE: 71 BPM | WEIGHT: 276.6 LBS | HEIGHT: 61 IN | OXYGEN SATURATION: 97 % | BODY MASS INDEX: 52.22 KG/M2

## 2022-09-01 DIAGNOSIS — E66.01 CLASS 3 OBESITY: Primary | ICD-10-CM

## 2022-09-01 DIAGNOSIS — Z71.3 NUTRITIONAL COUNSELING: ICD-10-CM

## 2022-09-01 PROCEDURE — 99213 OFFICE O/P EST LOW 20 MIN: CPT | Performed by: NURSE PRACTITIONER

## 2022-09-01 RX ORDER — FLASH GLUCOSE SENSOR
KIT MISCELLANEOUS
COMMUNITY
Start: 2022-07-15

## 2022-09-01 RX ORDER — DOXEPIN HYDROCHLORIDE 25 MG/1
25 CAPSULE ORAL NIGHTLY
COMMUNITY

## 2022-09-01 NOTE — PROGRESS NOTES
[facility]K BAR SURG [facility] BARIATRIC SURGERY  [address] [place] [address]  [address] [place] [address]  Dept: [phone]  9/1/2022      Gissel Amaro.  [id number]  [id number]  1970  female      Chief Complaint   Patient presents with   • Nutrition Counseling     SWL #6/6       The patient is here for month 6 of their pre-operative physician supervised diet. She had a gain of 1 lbs. The patient states that she is following the recommendations given by our office and dietician including a high lean protein, low carb and low fat diet. We recommended adequate fruits and vegetable intake along with limited portion sizes. Patient is working on eliminating fast foods, fried foods, sweets and soda. Gissel Amaro has been increasing her daily water intake. She has been exercising: walking a lot, going to [facility], [place] and walking.    Patient states they have made positive changes including increased protein, food logs  The patient admits to be struggling with none    Breakfast: duncan and egg and oatmeal, protein shake   Lunch: fish, tuna, peanut or ham sandwich, turkey,   Dinner: steak and potato salad, roast beef sandwich, ham salad and potatoes and green beans, fish and fries   Snacks: popcorn prn   Drinks: protein shake, water and lemonade, no carbonation   Exercise: walking a lot, going on vacation to [facility] [place]    Past goals:  Using stationary bike 1 time a day for 30 minutes- met    Food logs - met   EGD on 9/7    Review of Systems   Constitutional: Positive for activity change.   Respiratory: Negative.    Cardiovascular: Negative.    Gastrointestinal: Negative.    Musculoskeletal: Positive for arthralgias, back pain and myalgias.     Vitals:    09/01/22 1357   BP: 129/77   Pulse: 71   Resp: 16   SpO2: 97%     Patient Active Problem List   Diagnosis   • Benign hypertension   • Small vessel coronary artery disease   • Diabetes mellitus (HCC)    • Edema   • Mixed hyperlipidemia   • CHF (congestive heart failure) (Former[facility])   • Morbid obesity (HCC)   • Obstructive sleep apnea syndrome   • BMI 45.0-49.9, adult ([facility])   • Pre-operative examination     Body mass index is 52.26 kg/m².    The following portions of the patient's history were reviewed and updated as appropriate: active problem list, medication list, allergies, social history, notes from last encounter    Physical Exam  Constitutional:       Appearance: Normal appearance. She is obese.   Cardiovascular:      Pulses: Normal pulses.   Pulmonary:      Effort: Pulmonary effort is normal.   Abdominal:      General: Abdomen is flat.      Palpations: Abdomen is soft.   Skin:     General: Skin is warm and dry.   Neurological:      General: No focal deficit present.      Mental Status: She is alert and oriented to person, place, and time.   Psychiatric:         Mood and Affect: Mood normal.         Behavior: Behavior normal.         Thought Content: Thought content normal.         Judgment: Judgment normal.         Discussion/Plan:  Obesity/Morbid Obesity: Currently the patient's weight is stable. There are no medications prescribed.Treatment plan includes prescribed diet, prescribed exercise regimen and behavior modification.    I reviewed the appropriate dietary choices with the patient and encouraged the necessary changes. Recommended at least 70 grams of protein per day, around 35 grams of fats and less than 100 grams of carbohydrates. Reviewed calorie intake if patient wanted to calorie count and/or had BMR. Instructed patient to drink half of body weight in ounces per day and exercise a minimum of 150 minutes per week including both cardio and strength training. Discussed the option of keeping a food journal which will help patient become more aware of the nutritional value of foods so they are more prepared after surgery.    The patient was given written materials from our office for education.   I  answered all of the patients questions regarding dietary changes, exercise or surgical options.  The patient will follow up for pre op. The total time spent face to face was 20 minutes with 15 minutes spent counseling.    Pt is done with SWL. Plan to send off for insurance approval now and follow up for pre op.     KAYLAH Parry  [facility]ariat[facility]

## 2022-09-02 ENCOUNTER — PREP FOR SURGERY (OUTPATIENT)
Dept: OTHER | Facility: HOSPITAL | Age: 52
End: 2022-09-02

## 2022-09-02 PROBLEM — E66.813 OBESITY, CLASS III, BMI 40-49.9 (MORBID OBESITY): Status: ACTIVE | Noted: 2022-09-02

## 2022-09-02 PROBLEM — E66.01 OBESITY, CLASS III, BMI 40-49.9 (MORBID OBESITY) (HCC): Status: ACTIVE | Noted: 2022-09-02

## 2022-09-02 RX ORDER — METOCLOPRAMIDE HYDROCHLORIDE 5 MG/ML
10 INJECTION INTRAMUSCULAR; INTRAVENOUS ONCE
Status: CANCELLED | OUTPATIENT
Start: 2022-09-02 | End: 2022-09-02

## 2022-09-02 RX ORDER — PANTOPRAZOLE SODIUM 40 MG/10ML
40 INJECTION, POWDER, LYOPHILIZED, FOR SOLUTION INTRAVENOUS ONCE
Status: CANCELLED | OUTPATIENT
Start: 2022-09-02 | End: 2022-09-02

## 2022-09-02 RX ORDER — CHLORHEXIDINE GLUCONATE 0.12 MG/ML
15 RINSE ORAL SEE ADMIN INSTRUCTIONS
Status: CANCELLED | OUTPATIENT
Start: 2022-09-02

## 2022-09-02 RX ORDER — SODIUM CHLORIDE 0.9 % (FLUSH) 0.9 %
3 SYRINGE (ML) INJECTION EVERY 12 HOURS SCHEDULED
Status: CANCELLED | OUTPATIENT
Start: 2022-09-02

## 2022-09-02 RX ORDER — SODIUM CHLORIDE 0.9 % (FLUSH) 0.9 %
3-10 SYRINGE (ML) INJECTION AS NEEDED
Status: CANCELLED | OUTPATIENT
Start: 2022-09-02

## 2022-09-02 RX ORDER — SODIUM CHLORIDE, SODIUM LACTATE, POTASSIUM CHLORIDE, CALCIUM CHLORIDE 600; 310; 30; 20 MG/100ML; MG/100ML; MG/100ML; MG/100ML
100 INJECTION, SOLUTION INTRAVENOUS CONTINUOUS
Status: CANCELLED | OUTPATIENT
Start: 2022-09-02

## 2022-09-02 RX ORDER — CEFAZOLIN SODIUM IN 0.9 % NACL 3 G/100 ML
3 INTRAVENOUS SOLUTION, PIGGYBACK (ML) INTRAVENOUS
Status: CANCELLED | OUTPATIENT
Start: 2022-09-02

## 2022-09-02 RX ORDER — SCOLOPAMINE TRANSDERMAL SYSTEM 1 MG/1
1 PATCH, EXTENDED RELEASE TRANSDERMAL ONCE
Status: CANCELLED | OUTPATIENT
Start: 2022-09-02 | End: 2022-09-02

## 2022-09-07 ENCOUNTER — ON CAMPUS - OUTPATIENT (AMBULATORY)
Dept: URBAN - METROPOLITAN AREA HOSPITAL 77 | Facility: HOSPITAL | Age: 52
End: 2022-09-07

## 2022-09-07 DIAGNOSIS — R13.10 DYSPHAGIA, UNSPECIFIED: ICD-10-CM

## 2022-09-07 DIAGNOSIS — K31.7 POLYP OF STOMACH AND DUODENUM: ICD-10-CM

## 2022-09-07 PROCEDURE — 43450 DILATE ESOPHAGUS 1/MULT PASS: CPT | Performed by: INTERNAL MEDICINE

## 2022-09-07 PROCEDURE — 43251 EGD REMOVE LESION SNARE: CPT | Performed by: INTERNAL MEDICINE

## 2022-09-22 ENCOUNTER — TELEPHONE (OUTPATIENT)
Dept: BARIATRICS/WEIGHT MGMT | Facility: CLINIC | Age: 52
End: 2022-09-22

## 2022-09-30 ENCOUNTER — CONSULT (OUTPATIENT)
Dept: BARIATRICS/WEIGHT MGMT | Facility: CLINIC | Age: 52
End: 2022-09-30

## 2022-09-30 VITALS
DIASTOLIC BLOOD PRESSURE: 87 MMHG | HEIGHT: 61 IN | OXYGEN SATURATION: 98 % | HEART RATE: 96 BPM | BODY MASS INDEX: 53.09 KG/M2 | WEIGHT: 281.2 LBS | SYSTOLIC BLOOD PRESSURE: 145 MMHG

## 2022-09-30 PROCEDURE — 99215 OFFICE O/P EST HI 40 MIN: CPT | Performed by: SURGERY

## 2022-09-30 NOTE — PROGRESS NOTES
Bariatric Consult:    Chief Complaint: Morbid Obesity  Referred by Sony Wong MD    Gissel Amaro is here today for consult on Morbid Obesity    History of Present Illness:     Gissel Amaro is a 52 y.o. female with morbid obesity with co-morbidities including  has a past medical history of Anxiety, Asthma, CAD (coronary artery disease), Depression, Diabetes (HCC), MAX (dyspnea on exertion), Fibromyalgia, GERD (gastroesophageal reflux disease), HTN (hypertension), Hyperlipemia, IBS (irritable bowel syndrome), and Plantar fasciitis.  who presents for surgical consultation for the above procedure. Gissel has completed the initial intake visit and has been examined by our nurse practitioner, dietician, psychologist and underwent the extensive educational teaching process under the guidance of our bariatric coordinator and myself. Gissel also has seen the educational video JAMAR on the surgical procedure if available. Gissel attended today more educational teaching from our bariatric coordinator and myself. Gissel has had an extensive medical workup including a visit with their primary care physician, EKG, chest radiograph, blood work, EGD or UGI and possibly further testing. These have been reviewed by me and discussed with the patient. Gissel is now ready to proceed with surgery. Gissel presently denies nausea, vomiting, fever, chills, chest pain, shortness of air, melena, hematochezia, hemetemesis, dysuria, frequency, hematuria, jaundice or abdominal pain.     Wt Readings from Last 10 Encounters:   09/30/22 128 kg (281 lb 3.2 oz)   09/01/22 125 kg (276 lb 9.6 oz)   08/08/22 125 kg (275 lb 3.2 oz)   07/14/22 124 kg (272 lb 6.4 oz)   06/17/22 123 kg (270 lb 6.4 oz)   05/31/22 122 kg (268 lb)   05/18/22 122 kg (269 lb)   04/26/22 124 kg (272 lb 9.6 oz)   11/29/21 128 kg (283 lb)   08/23/21 122 kg (270 lb)       The  pre-op EGD shows stricture of esophagus, was stretched to 60 FR, having esophageal manometry  soon.     Past Medical History:   Diagnosis Date   • Anxiety    • Asthma    • CAD (coronary artery disease)    • Depression    • Diabetes (HCC)    • MAX (dyspnea on exertion)    • Fibromyalgia    • GERD (gastroesophageal reflux disease)    • HTN (hypertension)    • Hyperlipemia    • IBS (irritable bowel syndrome)    • Plantar fasciitis        No diagnosis found.    Past Surgical History:   Procedure Laterality Date   • BREAST SURGERY Bilateral     2015, 2020   • CARDIAC CATHETERIZATION     • COLONOSCOPY  2017   • COLONOSCOPY W/ BIOPSIES AND POLYPECTOMY  06/15/2022    5 polyps removed   • ENDOSCOPY  2021   • GALLBLADDER SURGERY  2010   • HYSTERECTOMY  2013   • KNEE SURGERY Right 2017   • LAPAROSCOPIC TUBAL LIGATION  2000   • TONSILLECTOMY  1976       Patient Active Problem List   Diagnosis   • Benign hypertension   • Small vessel coronary artery disease   • Diabetes mellitus (HCC)   • Edema   • Mixed hyperlipidemia   • CHF (congestive heart failure) ([facility])   • Morbid obesity ([facility])   • Obstructive sleep apnea syndrome   • BMI 45.0-49.9, adult ([facility])   • Pre-operative examination   • Obesity, Class III, BMI 40-49.9 (morbid obesity) ([facility])       Allergies   Allergen Reactions   • Penicillins Unknown (See Comments)   • Trulicity [Dulaglutide] Other (See Comments)     Caused pancreatitis         Current Outpatient Medications:   •  albuterol sulfate  (90 Base) MCG/ACT inhaler, VENTOLIN  (90 Base) MCG/ACT AERS, Disp: , Rfl:   •  amitriptyline (ELAVIL) 25 MG tablet, Take 25 mg by mouth Every Night., Disp: , Rfl:   •  busPIRone (BUSPAR) 15 MG tablet, 15 mg As Needed., Disp: , Rfl:   •  Cariprazine HCl (VRAYLAR) 1.5 MG capsule capsule, Take 1.5 mg by mouth Daily., Disp: , Rfl:   •  Dapagliflozin Propanediol (Farxiga) 10 MG tablet, Take 10 mg by mouth Daily., Disp: , Rfl:   •  dilTIAZem LA (Cardizem LA) 120 MG 24 hr tablet, Take 120 mg by mouth Daily., Disp: , Rfl:   •  doxepin (SINEquan) 25 MG capsule, doxepin 25  mg capsule  TAKE 1 CAPSULE BY MOUTH each day AT BEDTIME, Disp: , Rfl:   •  fenofibrate (TRICOR) 145 MG tablet, fenofibrate nanocrystallized 145 mg tablet  Take 1 tablet every day by oral route., Disp: , Rfl:   •  Fluticasone-Umeclidin-Vilant (Trelegy Ellipta) 100-62.5-25 MCG/INH inhaler, Trelegy Ellipta 100 mcg-62.5 mcg-25 mcg powder for inhalation  INHALE 1 PUFF BY MOUTH EVERY DAY, Disp: , Rfl:   •  furosemide (LASIX) 40 MG tablet, Take 40 mg by mouth Daily., Disp: , Rfl:   •  gabapentin (NEURONTIN) 600 MG tablet, Take 600 mg by mouth 3 (Three) Times a Day., Disp: , Rfl:   •  HYDROcodone-acetaminophen (NORCO)  MG per tablet, Take 1 tablet by mouth Every 8 (Eight) Hours As Needed., Disp: , Rfl:   •  Insulin Lispro, 0.5 Unit Dial, (HUMALOG) 100 UNIT/ML solution pen-injector, 20 Units. PRN - Based on sliding scale, Disp: , Rfl:   •  insulin regular (HUMULIN R) 500 UNIT/ML CONCENTRATED injection, Inject 150 Units under the skin into the appropriate area as directed. 150 units every am, 90 units qhs, Disp: , Rfl:   •  losartan (COZAAR) 25 MG tablet, Take 100 mg by mouth Daily., Disp: , Rfl:   •  ondansetron (ZOFRAN) 8 MG tablet, Take 8 mg by mouth Every 8 (Eight) Hours As Needed for Nausea or Vomiting., Disp: , Rfl:   •  potassium chloride (MICRO-K) 10 MEQ CR capsule, Take 10 mEq by mouth Daily., Disp: , Rfl:   •  propranolol (INDERAL) 80 MG tablet, Take 80 mg by mouth Daily., Disp: , Rfl:   •  rOPINIRole (REQUIP) 2 MG tablet, Take 2 mg by mouth Every Night., Disp: , Rfl:   •  sertraline (ZOLOFT) 100 MG tablet, Take 100 mg by mouth 2 (Two) Times a Day., Disp: , Rfl:   •  simvastatin (ZOCOR) 40 MG tablet, Take 80 mg by mouth Every Night., Disp: , Rfl:   •  SUMAtriptan (IMITREX) 100 MG tablet, Take 100 mg by mouth Every 2 (Two) Hours As Needed for Migraine. Take one tablet at onset of headache. May repeat dose one time in 2 hours if headache not relieved., Disp: , Rfl:   •  TiZANidine (ZANAFLEX) 4 MG capsule, Take 4  "mg by mouth 3 (Three) Times a Day As Needed., Disp: , Rfl:   •  vitamin D (ERGOCALCIFEROL) 1.25 MG (95541 UT) capsule capsule, Take 50,000 Units by mouth 1 (One) Time Per Week., Disp: , Rfl:   •  aspirin (aspirin) 81 MG EC tablet, Take 1 tablet by mouth Daily., Disp: , Rfl:   •  busPIRone (BUSPAR) 5 MG tablet, Take 5 mg by mouth Daily., Disp: , Rfl:   •  Continuous Blood Gluc Sensor (FreeStyle Sushma 14 Day Sensor) misc, replace 1 sensor every 14 days, Disp: , Rfl:   •  dicyclomine (BENTYL) 20 MG tablet, Take 20 mg by mouth 3 (Three) Times a Day., Disp: , Rfl:   •  furosemide (LASIX) 20 MG tablet, Take 40 mg by mouth Daily., Disp: , Rfl:   •  glucose blood test strip, Accu-Chek Guide test strips, Disp: , Rfl:   •  Insulin Lispro (humaLOG) 100 UNIT/ML injection, Inject  under the skin into the appropriate area as directed As Needed for High Blood Sugar. Sliding scale - Maximum 20 units daily, Disp: , Rfl:   •  Insulin Pen Needle 32G X 6 MM misc, BD Ultra-Fine Mini Pen Needle 31 gauge x 3/16\"  USE TWICE DAILY FOR INSULIN AS DIRECTED BY YOUR DOCTOR, Disp: , Rfl:   •  pantoprazole (PROTONIX) 40 MG EC tablet, Take  by mouth Daily., Disp: , Rfl: 5  •  propranolol (INDERAL) 60 MG tablet, 80 mg Daily., Disp: , Rfl:   •  temazepam (RESTORIL) 30 MG capsule, Take 30 mg by mouth At Night As Needed for Sleep., Disp: , Rfl:     Current Facility-Administered Medications:   •  Evolocumab (REPATHA) injection 140 mg, 140 mg, Subcutaneous, Q14 Days, Fidelina Cronin, KAYLAH    Social History     Socioeconomic History   • Marital status:    Tobacco Use   • Smoking status: Never Smoker   • Smokeless tobacco: Never Used   Vaping Use   • Vaping Use: Never used   Substance and Sexual Activity   • Alcohol use: Never   • Drug use: Never   • Sexual activity: Defer       Family History   Problem Relation Age of Onset   • Cancer Mother 60        Breast   • COPD Father 64   • Heart disease Father    • Obesity Maternal Grandmother  "       Review of Systems:  Review of Systems    Review of Systems   Constitutional: Negative.    HENT: Negative.    Eyes: Negative.    Respiratory: Negative.    Cardiovascular: Negative.    Gastrointestinal: Negative.    Endocrine: Negative.    Genitourinary: Negative.    Musculoskeletal: Negative.    Skin: Negative.    Allergic/Immunologic: Negative.    Neurological: Negative.    Hematological: Negative.    Psychiatric/Behavioral: Negative.      Physical Exam:  Body mass index is 53.13 kg/m².   Vital Signs:  Weight: 128 kg (281 lb 3.2 oz)   Body mass index is 53.13 kg/m².      Heart Rate: 96   BP: 145/87     Awake and alert  Normal mental status  Normal pulmonary effort  Abdomen appropriate tenderness  Incisions no erythema  Extremities no tenderness or swelling      Assessment:    Gissel Amaro is a 52 y.o. year old female with medically complicated severe obesity with a BMI of Body mass index is 53.13 kg/m². and multiple co-morbidities listed in the encounter diagnosis.    I think she is an appropriate candidate for this surgery, and is ready to proceed.      The patient has returned to the office for a surgical consultation and has requested to proceed with a laparoscopic gastric sleeve.  I have had the opportunity to obtain a history, examine the patient and review the patient's chart.    The patient understands that surgery is a tool and that weight loss is not guaranteed but only seen in the context of appropriate use, regular follow up, exercise and making appropriate food choices.     I personally discussed the potential complications of the laparoscopic gastric sleeve with this patient.  The patient is well aware of potential complications of the surgery that include but not limited to bleeding, infections, deep vein thrombosis, pulmonary embolism, pulmonary complications such as pneumonia, cardiac event, hernias, small bowel obstruction, damage to the spleen or other organs, bowel injury, disfiguring  scars, failure to lose weight, need for additional surgery, conversion to an open procedure and death.  The patient is also aware of complications which apply in particular to the gastric sleeve and can include but not limited to the leakage of gastric contents at the staple line, the development of an intra-abdominal abscess, gastroesophageal reflux disease, Guthrie's esophagus, ulcers, vitamin/mineral deficiencies, strictures, and the possibility of converting this procedure to a Arlette-en-Y gastric bypass. The patient also understands the possibility of requiring an acid reducer medication for the rest of their life.    The risks, benefits, potential complications and alternative therapies were discussed at great length as outlined in our extensive consent forms, online consent and educational teaching processes.    The patient has confirmed the participation in the programs extensive educational activities.    All questions and concerns were answered to patient's satisfaction.  The patient now wishes to proceed with surgery.    Patient has [default value] the pre-operative insertion of an IVC filter.     The patient has [default value] a postoperative course of anitcoagulant therapy.        Plan/Discussion/Summary:        I instructed patient to start on a H2 blocker or proton pump inhibitor if not already on one of these medications.    I explained in detail the procedures that we perform.  All of these procedures have a chance to convert to open if any technical challenges or complications do occur.  Bariatric surgery is not cosmetic surgery but rather a tool to help a patient make a life-long commitment lifestyle change including diet, exercise, behavior changes, and taking supplemental vitamins and minerals.    Problems after surgery may require more operations to correct them.    The risks, benefits, alternatives, and potential complications of all of the procedures were explained in detail including, but not  limited to death, anesthesia and medication adverse effect, deep venous thrombosis, pulmonary embolism, trocar site/incisional hernia, wound infection, abdominal infection, bleeding, failure to lose weight, gain weight, a change in body image, metabolic complications with vitamin deficiences and anemia.    Weight loss expectations were discussed with the patient in detail. The weight loss operations most commonly performed are the sleeve gastrectomy and the Arlette-en-Y gastric bypass. These operations result in weight losses up to approximately 25-35% of initial body weight 12 to 24 months after surgery with the gastric bypass usually the higher percent of weight loss but depends on patient using the tool.    For the gastric bypass and loop duodenal switch (ANDRES-S) the risks include but not limited to the following early complications:  Anastomotic leak/peritonitis, Arlette/Alimentary/biliopancreatic limb obstruction, severe & minor wound infection/seroma, and nausea/vomiting.  Late complications can include but are not limited to malnutrition, vitamin deficiencies, frequent loose stools,  stomal stenosis, marginal ulcer, bowel obstruction, intussusception, internal, and incisional hernia.    Regarding the gastric sleeve, there is less long-term outcome data and higher risk of dysphagia and reflux compared to a gastric bypass, as well as risk of internal visceral/organ injury, splenectomy, bleeding, infection, leak (which could require further intervention possible conversion to Arlette-en-Y gastric bypass), stenosis and possibility of regaining weight.    Gissel was counseled regarding diagnostic results, instructions for management, risk factor reductions, prognosis, patient and family education, impressions, risks and benefits of treatment options and importance of compliance with treatment. Total face to face time of the encounter was over 45 minutes and over 30 minutes was spent counseling.     Gera Barrientos   As part  of this patient's treatment plan I am prescribing controlled substances. The patient has been made aware of appropriate use of such medications, including potential risk of somnolence, limited ability to drive and /or work safely, and potential for dependence or overdose. It has also been made clear that these medications are for use by this patient only, without concomitant use of alcohol or other substances unless prescribed.    Gissel has completed prescribing agreement detailing terms of continued prescribing of controlled substances, including monitoring GLORIA reports, urine drug screening, and pill counts if necessary. Gissel is aware that inappropriate use will result in cessation of prescribing such medications.    GLORIA report has been reviewed      History and physical exam exhibit continued safe and appropriate use of controlled substances.      Gissel understands the surgical procedures and the different surgical options that are available.  She understands the lifestyle changes that are required after surgery and has agreed to follow the guidelines outlined in the weight management program.  She also expressed understanding of the risks involved and had all of female questions answered and desires to proceed.      Gloria Marinelli MD  9/30/2022  Answers for HPI/ROS submitted by the patient on 9/23/2022  Please describe your symptoms.: None  Have you had these symptoms before?: No  How long have you been having these symptoms?: 1-4 days  What is the primary reason for your visit?: Other

## 2022-10-06 ENCOUNTER — LAB (OUTPATIENT)
Dept: LAB | Facility: HOSPITAL | Age: 52
End: 2022-10-06

## 2022-10-06 ENCOUNTER — HOSPITAL ENCOUNTER (OUTPATIENT)
Dept: CARDIOLOGY | Facility: HOSPITAL | Age: 52
Discharge: HOME | End: 2022-10-06

## 2022-10-06 LAB
ABO GROUP BLD: NORMAL
ANION GAP SERPL CALCULATED.3IONS-SCNC: 13.8 MMOL/L (ref 5–15)
BLD GP AB SCN SERPL QL: NEGATIVE
BUN SERPL-MCNC: 40 MG/DL (ref 6–20)
BUN/CREAT SERPL: 19.8 (ref 7–25)
CALCIUM SPEC-SCNC: 10.2 MG/DL (ref 8.6–10.5)
CHLORIDE SERPL-SCNC: 101 MMOL/L (ref 98–107)
CO2 SERPL-SCNC: 23.2 MMOL/L (ref 22–29)
CREAT SERPL-MCNC: 2.02 MG/DL (ref 0.57–1)
DEPRECATED RDW RBC AUTO: 41.7 FL (ref 37–54)
EGFRCR SERPLBLD CKD-EPI 2021: 29 ML/MIN/1.73
ERYTHROCYTE [DISTWIDTH] IN BLOOD BY AUTOMATED COUNT: 13.8 % (ref 12.3–15.4)
GLUCOSE SERPL-MCNC: 191 MG/DL (ref 65–99)
HCT VFR BLD AUTO: 42.2 % (ref 34–46.6)
HGB BLD-MCNC: 14.1 G/DL (ref 12–15.9)
MCH RBC QN AUTO: 28 PG (ref 26.6–33)
MCHC RBC AUTO-ENTMCNC: 33.4 G/DL (ref 31.5–35.7)
MCV RBC AUTO: 83.9 FL (ref 79–97)
PLATELET # BLD AUTO: 136 10*3/MM3 (ref 140–450)
PMV BLD AUTO: 9.7 FL (ref 6–12)
POTASSIUM SERPL-SCNC: 4.4 MMOL/L (ref 3.5–5.2)
QT INTERVAL: 371 MS
RBC # BLD AUTO: 5.03 10*6/MM3 (ref 3.77–5.28)
RH BLD: POSITIVE
SODIUM SERPL-SCNC: 138 MMOL/L (ref 136–145)
T&S EXPIRATION DATE: NORMAL
WBC NRBC COR # BLD: 5.86 10*3/MM3 (ref 3.4–10.8)

## 2022-10-06 PROCEDURE — 80048 BASIC METABOLIC PNL TOTAL CA: CPT

## 2022-10-06 PROCEDURE — 86900 BLOOD TYPING SEROLOGIC ABO: CPT

## 2022-10-06 PROCEDURE — 93010 ELECTROCARDIOGRAM REPORT: CPT | Performed by: INTERNAL MEDICINE

## 2022-10-06 PROCEDURE — 86901 BLOOD TYPING SEROLOGIC RH(D): CPT

## 2022-10-06 PROCEDURE — 85027 COMPLETE CBC AUTOMATED: CPT

## 2022-10-06 PROCEDURE — 86850 RBC ANTIBODY SCREEN: CPT

## 2022-10-06 PROCEDURE — 36415 COLL VENOUS BLD VENIPUNCTURE: CPT

## 2022-10-06 PROCEDURE — 93005 ELECTROCARDIOGRAM TRACING: CPT | Performed by: SURGERY

## 2022-10-07 ENCOUNTER — TELEPHONE (OUTPATIENT)
Dept: BARIATRICS/WEIGHT MGMT | Facility: CLINIC | Age: 52
End: 2022-10-07

## 2022-10-07 NOTE — TELEPHONE ENCOUNTER
Jennifer with [facility] called to let us know that Gissel had an appointment with them for the esophageal manometry and she was unable to tolerate the placement of the catheter, so they were unable to complete the testing.

## 2022-10-18 ENCOUNTER — ANESTHESIA EVENT (OUTPATIENT)
Dept: PERIOP | Facility: HOSPITAL | Age: 52
End: 2022-10-18

## 2022-10-19 ENCOUNTER — ANESTHESIA (OUTPATIENT)
Dept: PERIOP | Facility: HOSPITAL | Age: 52
End: 2022-10-19

## 2022-10-19 ENCOUNTER — HOSPITAL ENCOUNTER (INPATIENT)
Facility: HOSPITAL | Age: 52
LOS: 1 days | Discharge: HOME | End: 2022-10-20
Attending: SURGERY | Admitting: SURGERY

## 2022-10-19 DIAGNOSIS — E66.01 OBESITY, CLASS III, BMI 40-49.9 (MORBID OBESITY): Primary | ICD-10-CM

## 2022-10-19 LAB
ALBUMIN SERPL-MCNC: 4.2 G/DL (ref 3.5–5.2)
ALBUMIN/GLOB SERPL: 1.6 G/DL
ALP SERPL-CCNC: 54 U/L (ref 39–117)
ALT SERPL W P-5'-P-CCNC: 39 U/L (ref 1–33)
ANION GAP SERPL CALCULATED.3IONS-SCNC: 13 MMOL/L (ref 5–15)
ANION GAP SERPL CALCULATED.3IONS-SCNC: 14 MMOL/L (ref 5–15)
AST SERPL-CCNC: 40 U/L (ref 1–32)
BASOPHILS # BLD AUTO: 0 10*3/MM3 (ref 0–0.2)
BASOPHILS NFR BLD AUTO: 0.3 % (ref 0–1.5)
BILIRUB SERPL-MCNC: 0.8 MG/DL (ref 0–1.2)
BUN SERPL-MCNC: 28 MG/DL (ref 6–20)
BUN SERPL-MCNC: 31 MG/DL (ref 6–20)
BUN/CREAT SERPL: 15.7 (ref 7–25)
BUN/CREAT SERPL: 15.7 (ref 7–25)
CALCIUM SPEC-SCNC: 9.2 MG/DL (ref 8.6–10.5)
CALCIUM SPEC-SCNC: 9.6 MG/DL (ref 8.6–10.5)
CHLORIDE SERPL-SCNC: 104 MMOL/L (ref 98–107)
CHLORIDE SERPL-SCNC: 107 MMOL/L (ref 98–107)
CO2 SERPL-SCNC: 19 MMOL/L (ref 22–29)
CO2 SERPL-SCNC: 21 MMOL/L (ref 22–29)
CREAT SERPL-MCNC: 1.78 MG/DL (ref 0.57–1)
CREAT SERPL-MCNC: 1.97 MG/DL (ref 0.57–1)
DEPRECATED RDW RBC AUTO: 45.1 FL (ref 37–54)
EGFRCR SERPLBLD CKD-EPI 2021: 29.9 ML/MIN/1.73
EGFRCR SERPLBLD CKD-EPI 2021: 33.8 ML/MIN/1.73
EOSINOPHIL # BLD AUTO: 0 10*3/MM3 (ref 0–0.4)
EOSINOPHIL NFR BLD AUTO: 0.1 % (ref 0.3–6.2)
ERYTHROCYTE [DISTWIDTH] IN BLOOD BY AUTOMATED COUNT: 14.8 % (ref 12.3–15.4)
GLOBULIN UR ELPH-MCNC: 2.7 GM/DL
GLUCOSE BLDC GLUCOMTR-MCNC: 141 MG/DL (ref 70–105)
GLUCOSE BLDC GLUCOMTR-MCNC: 193 MG/DL (ref 70–105)
GLUCOSE BLDC GLUCOMTR-MCNC: 223 MG/DL (ref 70–105)
GLUCOSE BLDC GLUCOMTR-MCNC: 272 MG/DL (ref 70–105)
GLUCOSE BLDC GLUCOMTR-MCNC: 285 MG/DL (ref 70–105)
GLUCOSE SERPL-MCNC: 148 MG/DL (ref 65–99)
GLUCOSE SERPL-MCNC: 334 MG/DL (ref 65–99)
HCT VFR BLD AUTO: 38.2 % (ref 34–46.6)
HGB BLD-MCNC: 12.7 G/DL (ref 12–15.9)
LYMPHOCYTES # BLD AUTO: 0.9 10*3/MM3 (ref 0.7–3.1)
LYMPHOCYTES NFR BLD AUTO: 14.6 % (ref 19.6–45.3)
MAGNESIUM SERPL-MCNC: 1.9 MG/DL (ref 1.6–2.6)
MCH RBC QN AUTO: 28.8 PG (ref 26.6–33)
MCHC RBC AUTO-ENTMCNC: 33.2 G/DL (ref 31.5–35.7)
MCV RBC AUTO: 86.7 FL (ref 79–97)
MONOCYTES # BLD AUTO: 0.1 10*3/MM3 (ref 0.1–0.9)
MONOCYTES NFR BLD AUTO: 2.1 % (ref 5–12)
NEUTROPHILS NFR BLD AUTO: 5.3 10*3/MM3 (ref 1.7–7)
NEUTROPHILS NFR BLD AUTO: 82.9 % (ref 42.7–76)
NRBC BLD AUTO-RTO: 0 /100 WBC (ref 0–0.2)
PHOSPHATE SERPL-MCNC: 5.3 MG/DL (ref 2.5–4.5)
PLATELET # BLD AUTO: 123 10*3/MM3 (ref 140–450)
PMV BLD AUTO: 8 FL (ref 6–12)
POTASSIUM SERPL-SCNC: 3.8 MMOL/L (ref 3.5–5.2)
POTASSIUM SERPL-SCNC: 5.2 MMOL/L (ref 3.5–5.2)
PROT SERPL-MCNC: 6.9 G/DL (ref 6–8.5)
RBC # BLD AUTO: 4.4 10*6/MM3 (ref 3.77–5.28)
SODIUM SERPL-SCNC: 137 MMOL/L (ref 136–145)
SODIUM SERPL-SCNC: 141 MMOL/L (ref 136–145)
WBC NRBC COR # BLD: 6.4 10*3/MM3 (ref 3.4–10.8)

## 2022-10-19 PROCEDURE — 8E0W4CZ ROBOTIC ASSISTED PROCEDURE OF TRUNK REGION, PERCUTANEOUS ENDOSCOPIC APPROACH: ICD-10-PCS | Performed by: SURGERY

## 2022-10-19 PROCEDURE — 84100 ASSAY OF PHOSPHORUS: CPT | Performed by: SURGERY

## 2022-10-19 PROCEDURE — 63710000001 INSULIN LISPRO (HUMAN) PER 5 UNITS: Performed by: SURGERY

## 2022-10-19 PROCEDURE — 25010000002 DEXAMETHASONE PER 1 MG: Performed by: NURSE ANESTHETIST, CERTIFIED REGISTERED

## 2022-10-19 PROCEDURE — 85025 COMPLETE CBC W/AUTO DIFF WBC: CPT | Performed by: SURGERY

## 2022-10-19 PROCEDURE — 25010000002 HYDROMORPHONE 1 MG/ML SOLUTION: Performed by: NURSE ANESTHETIST, CERTIFIED REGISTERED

## 2022-10-19 PROCEDURE — 0DB64Z3 EXCISION OF STOMACH, PERCUTANEOUS ENDOSCOPIC APPROACH, VERTICAL: ICD-10-PCS | Performed by: SURGERY

## 2022-10-19 PROCEDURE — 43775 LAP SLEEVE GASTRECTOMY: CPT | Performed by: SURGERY

## 2022-10-19 PROCEDURE — 25010000002 METOCLOPRAMIDE PER 10 MG: Performed by: SURGERY

## 2022-10-19 PROCEDURE — 25010000002 ONDANSETRON PER 1 MG: Performed by: NURSE ANESTHETIST, CERTIFIED REGISTERED

## 2022-10-19 PROCEDURE — 25010000002 KETOROLAC TROMETHAMINE PER 15 MG: Performed by: NURSE ANESTHETIST, CERTIFIED REGISTERED

## 2022-10-19 PROCEDURE — 43775 LAP SLEEVE GASTRECTOMY: CPT | Performed by: SPECIALIST/TECHNOLOGIST, OTHER

## 2022-10-19 PROCEDURE — 25010000002 FENTANYL CITRATE (PF) 50 MCG/ML SOLUTION: Performed by: NURSE ANESTHETIST, CERTIFIED REGISTERED

## 2022-10-19 PROCEDURE — 25010000002 PROPOFOL 10 MG/ML EMULSION: Performed by: NURSE ANESTHETIST, CERTIFIED REGISTERED

## 2022-10-19 PROCEDURE — 80053 COMPREHEN METABOLIC PANEL: CPT | Performed by: SURGERY

## 2022-10-19 PROCEDURE — 0 BUPIVACAINE LIPOSOME 1.3 % SUSPENSION: Performed by: SURGERY

## 2022-10-19 PROCEDURE — 83735 ASSAY OF MAGNESIUM: CPT | Performed by: SURGERY

## 2022-10-19 PROCEDURE — C9290 INJ, BUPIVACAINE LIPOSOME: HCPCS | Performed by: SURGERY

## 2022-10-19 PROCEDURE — 82962 GLUCOSE BLOOD TEST: CPT

## 2022-10-19 PROCEDURE — S2900 ROBOTIC SURGICAL SYSTEM: HCPCS | Performed by: SURGERY

## 2022-10-19 PROCEDURE — 88307 TISSUE EXAM BY PATHOLOGIST: CPT | Performed by: SURGERY

## 2022-10-19 PROCEDURE — 25010000002 FENTANYL CITRATE (PF) 100 MCG/2ML SOLUTION: Performed by: NURSE ANESTHETIST, CERTIFIED REGISTERED

## 2022-10-19 DEVICE — STAPLER 60 RELOAD WHITE
Type: IMPLANTABLE DEVICE | Site: ABDOMEN | Status: FUNCTIONAL
Brand: SUREFORM

## 2022-10-19 DEVICE — SEALANT WND FIBRIN TISSEEL PREFIL/SYR/PRIMAFZ 4ML: Type: IMPLANTABLE DEVICE | Site: ABDOMEN | Status: FUNCTIONAL

## 2022-10-19 DEVICE — STAPLER 60 RELOAD BLUE
Type: IMPLANTABLE DEVICE | Site: ABDOMEN | Status: FUNCTIONAL
Brand: SUREFORM

## 2022-10-19 RX ORDER — HYDROCODONE BITARTRATE AND ACETAMINOPHEN 7.5; 325 MG/1; MG/1
1 TABLET ORAL ONCE AS NEEDED
Status: COMPLETED | OUTPATIENT
Start: 2022-10-19 | End: 2022-10-19

## 2022-10-19 RX ORDER — SODIUM CHLORIDE 0.9 % (FLUSH) 0.9 %
3-10 SYRINGE (ML) INJECTION AS NEEDED
Status: DISCONTINUED | OUTPATIENT
Start: 2022-10-19 | End: 2022-10-19 | Stop reason: HOSPADM

## 2022-10-19 RX ORDER — ROCURONIUM BROMIDE 10 MG/ML
INJECTION, SOLUTION INTRAVENOUS AS NEEDED
Status: DISCONTINUED | OUTPATIENT
Start: 2022-10-19 | End: 2022-10-19 | Stop reason: SURG

## 2022-10-19 RX ORDER — NALOXONE HCL 0.4 MG/ML
0.1 VIAL (ML) INJECTION
Status: DISCONTINUED | OUTPATIENT
Start: 2022-10-19 | End: 2022-10-20 | Stop reason: HOSPADM

## 2022-10-19 RX ORDER — ALBUTEROL SULFATE 2.5 MG/3ML
2.5 SOLUTION RESPIRATORY (INHALATION) EVERY 4 HOURS PRN
Status: DISCONTINUED | OUTPATIENT
Start: 2022-10-19 | End: 2022-10-20 | Stop reason: HOSPADM

## 2022-10-19 RX ORDER — SODIUM CHLORIDE, SODIUM LACTATE, POTASSIUM CHLORIDE, CALCIUM CHLORIDE 600; 310; 30; 20 MG/100ML; MG/100ML; MG/100ML; MG/100ML
100 INJECTION, SOLUTION INTRAVENOUS CONTINUOUS
Status: DISCONTINUED | OUTPATIENT
Start: 2022-10-19 | End: 2022-10-19

## 2022-10-19 RX ORDER — SCOLOPAMINE TRANSDERMAL SYSTEM 1 MG/1
1 PATCH, EXTENDED RELEASE TRANSDERMAL ONCE
Status: DISCONTINUED | OUTPATIENT
Start: 2022-10-19 | End: 2022-10-19

## 2022-10-19 RX ORDER — PANTOPRAZOLE SODIUM 40 MG/1
40 TABLET, DELAYED RELEASE ORAL DAILY
Status: DISCONTINUED | OUTPATIENT
Start: 2022-10-19 | End: 2022-10-20 | Stop reason: HOSPADM

## 2022-10-19 RX ORDER — SODIUM CHLORIDE 0.9 % (FLUSH) 0.9 %
3 SYRINGE (ML) INJECTION EVERY 12 HOURS SCHEDULED
Status: DISCONTINUED | OUTPATIENT
Start: 2022-10-19 | End: 2022-10-20 | Stop reason: HOSPADM

## 2022-10-19 RX ORDER — GABAPENTIN 600 MG/1
600 TABLET ORAL 3 TIMES DAILY PRN
Status: DISCONTINUED | OUTPATIENT
Start: 2022-10-19 | End: 2022-10-20 | Stop reason: HOSPADM

## 2022-10-19 RX ORDER — BUDESONIDE AND FORMOTEROL FUMARATE DIHYDRATE 80; 4.5 UG/1; UG/1
2 AEROSOL RESPIRATORY (INHALATION)
Status: DISCONTINUED | OUTPATIENT
Start: 2022-10-19 | End: 2022-10-20 | Stop reason: HOSPADM

## 2022-10-19 RX ORDER — SODIUM CHLORIDE, SODIUM LACTATE, POTASSIUM CHLORIDE, CALCIUM CHLORIDE 600; 310; 30; 20 MG/100ML; MG/100ML; MG/100ML; MG/100ML
INJECTION, SOLUTION INTRAVENOUS CONTINUOUS PRN
Status: DISCONTINUED | OUTPATIENT
Start: 2022-10-19 | End: 2022-10-19 | Stop reason: SURG

## 2022-10-19 RX ORDER — ONDANSETRON 2 MG/ML
8 INJECTION INTRAMUSCULAR; INTRAVENOUS EVERY 6 HOURS PRN
Status: DISCONTINUED | OUTPATIENT
Start: 2022-10-19 | End: 2022-10-20 | Stop reason: HOSPADM

## 2022-10-19 RX ORDER — KETOROLAC TROMETHAMINE 30 MG/ML
INJECTION, SOLUTION INTRAMUSCULAR; INTRAVENOUS AS NEEDED
Status: DISCONTINUED | OUTPATIENT
Start: 2022-10-19 | End: 2022-10-19 | Stop reason: SURG

## 2022-10-19 RX ORDER — LOSARTAN POTASSIUM 25 MG/1
25 TABLET ORAL DAILY
Status: DISCONTINUED | OUTPATIENT
Start: 2022-10-20 | End: 2022-10-20 | Stop reason: HOSPADM

## 2022-10-19 RX ORDER — AMITRIPTYLINE HYDROCHLORIDE 25 MG/1
25 TABLET, FILM COATED ORAL NIGHTLY
Status: DISCONTINUED | OUTPATIENT
Start: 2022-10-19 | End: 2022-10-20 | Stop reason: HOSPADM

## 2022-10-19 RX ORDER — SUMATRIPTAN 50 MG/1
100 TABLET, FILM COATED ORAL
Status: DISCONTINUED | OUTPATIENT
Start: 2022-10-19 | End: 2022-10-20 | Stop reason: HOSPADM

## 2022-10-19 RX ORDER — SODIUM CHLORIDE 0.9 % (FLUSH) 0.9 %
10 SYRINGE (ML) INJECTION EVERY 12 HOURS SCHEDULED
Status: DISCONTINUED | OUTPATIENT
Start: 2022-10-19 | End: 2022-10-19 | Stop reason: HOSPADM

## 2022-10-19 RX ORDER — PROCHLORPERAZINE EDISYLATE 5 MG/ML
10 INJECTION INTRAMUSCULAR; INTRAVENOUS EVERY 6 HOURS PRN
Status: DISCONTINUED | OUTPATIENT
Start: 2022-10-19 | End: 2022-10-20 | Stop reason: HOSPADM

## 2022-10-19 RX ORDER — DIPHENHYDRAMINE HYDROCHLORIDE 50 MG/ML
12.5 INJECTION INTRAMUSCULAR; INTRAVENOUS ONCE AS NEEDED
Status: DISCONTINUED | OUTPATIENT
Start: 2022-10-19 | End: 2022-10-19 | Stop reason: HOSPADM

## 2022-10-19 RX ORDER — ACETAMINOPHEN 160 MG/5ML
1000 SOLUTION ORAL EVERY 6 HOURS
Status: DISCONTINUED | OUTPATIENT
Start: 2022-10-19 | End: 2022-10-19

## 2022-10-19 RX ORDER — ACETAMINOPHEN 500 MG
1000 TABLET ORAL EVERY 6 HOURS
Status: DISCONTINUED | OUTPATIENT
Start: 2022-10-19 | End: 2022-10-19

## 2022-10-19 RX ORDER — METOCLOPRAMIDE HYDROCHLORIDE 5 MG/ML
10 INJECTION INTRAMUSCULAR; INTRAVENOUS ONCE
Status: COMPLETED | OUTPATIENT
Start: 2022-10-19 | End: 2022-10-19

## 2022-10-19 RX ORDER — HYDRALAZINE HYDROCHLORIDE 20 MG/ML
10 INJECTION INTRAMUSCULAR; INTRAVENOUS
Status: DISCONTINUED | OUTPATIENT
Start: 2022-10-19 | End: 2022-10-20 | Stop reason: HOSPADM

## 2022-10-19 RX ORDER — MIDAZOLAM HYDROCHLORIDE 1 MG/ML
1 INJECTION INTRAMUSCULAR; INTRAVENOUS
Status: DISCONTINUED | OUTPATIENT
Start: 2022-10-19 | End: 2022-10-19 | Stop reason: HOSPADM

## 2022-10-19 RX ORDER — ONDANSETRON 4 MG/1
8 TABLET, FILM COATED ORAL EVERY 6 HOURS PRN
Status: DISCONTINUED | OUTPATIENT
Start: 2022-10-19 | End: 2022-10-20 | Stop reason: HOSPADM

## 2022-10-19 RX ORDER — LOSARTAN POTASSIUM 50 MG/1
100 TABLET ORAL DAILY
Status: DISCONTINUED | OUTPATIENT
Start: 2022-10-19 | End: 2022-10-19

## 2022-10-19 RX ORDER — TIZANIDINE 4 MG/1
4 TABLET ORAL EVERY 6 HOURS PRN
Status: DISCONTINUED | OUTPATIENT
Start: 2022-10-19 | End: 2022-10-20 | Stop reason: HOSPADM

## 2022-10-19 RX ORDER — FLUMAZENIL 0.1 MG/ML
0.1 INJECTION INTRAVENOUS AS NEEDED
Status: DISCONTINUED | OUTPATIENT
Start: 2022-10-19 | End: 2022-10-19 | Stop reason: HOSPADM

## 2022-10-19 RX ORDER — OLANZAPINE 10 MG/2ML
1 INJECTION, POWDER, LYOPHILIZED, FOR SOLUTION INTRAMUSCULAR
Status: DISCONTINUED | OUTPATIENT
Start: 2022-10-19 | End: 2022-10-20 | Stop reason: HOSPADM

## 2022-10-19 RX ORDER — CEFAZOLIN SODIUM IN 0.9 % NACL 3 G/100 ML
3 INTRAVENOUS SOLUTION, PIGGYBACK (ML) INTRAVENOUS
Status: COMPLETED | OUTPATIENT
Start: 2022-10-19 | End: 2022-10-19

## 2022-10-19 RX ORDER — PROPRANOLOL HYDROCHLORIDE 20 MG/1
80 TABLET ORAL DAILY
Status: DISCONTINUED | OUTPATIENT
Start: 2022-10-19 | End: 2022-10-20 | Stop reason: HOSPADM

## 2022-10-19 RX ORDER — FENTANYL CITRATE 50 UG/ML
INJECTION, SOLUTION INTRAMUSCULAR; INTRAVENOUS AS NEEDED
Status: DISCONTINUED | OUTPATIENT
Start: 2022-10-19 | End: 2022-10-19 | Stop reason: SURG

## 2022-10-19 RX ORDER — SODIUM CHLORIDE, SODIUM LACTATE, POTASSIUM CHLORIDE, CALCIUM CHLORIDE 600; 310; 30; 20 MG/100ML; MG/100ML; MG/100ML; MG/100ML
150 INJECTION, SOLUTION INTRAVENOUS CONTINUOUS
Status: DISCONTINUED | OUTPATIENT
Start: 2022-10-19 | End: 2022-10-20 | Stop reason: HOSPADM

## 2022-10-19 RX ORDER — CYANOCOBALAMIN 1000 UG/ML
1000 INJECTION, SOLUTION INTRAMUSCULAR; SUBCUTANEOUS ONCE
Status: COMPLETED | OUTPATIENT
Start: 2022-10-20 | End: 2022-10-20

## 2022-10-19 RX ORDER — FENTANYL CITRATE 50 UG/ML
50 INJECTION, SOLUTION INTRAMUSCULAR; INTRAVENOUS
Status: DISCONTINUED | OUTPATIENT
Start: 2022-10-19 | End: 2022-10-19 | Stop reason: HOSPADM

## 2022-10-19 RX ORDER — DIPHENHYDRAMINE HYDROCHLORIDE 50 MG/ML
25 INJECTION INTRAMUSCULAR; INTRAVENOUS EVERY 4 HOURS PRN
Status: DISCONTINUED | OUTPATIENT
Start: 2022-10-19 | End: 2022-10-20 | Stop reason: HOSPADM

## 2022-10-19 RX ORDER — DEXTROSE MONOHYDRATE 25 G/50ML
25 INJECTION, SOLUTION INTRAVENOUS
Status: DISCONTINUED | OUTPATIENT
Start: 2022-10-19 | End: 2022-10-20 | Stop reason: HOSPADM

## 2022-10-19 RX ORDER — NALOXONE HCL 0.4 MG/ML
0.4 VIAL (ML) INJECTION AS NEEDED
Status: DISCONTINUED | OUTPATIENT
Start: 2022-10-19 | End: 2022-10-19 | Stop reason: HOSPADM

## 2022-10-19 RX ORDER — DILTIAZEM HYDROCHLORIDE 120 MG/1
120 CAPSULE, COATED, EXTENDED RELEASE ORAL
Status: DISCONTINUED | OUTPATIENT
Start: 2022-10-19 | End: 2022-10-20 | Stop reason: HOSPADM

## 2022-10-19 RX ORDER — ACETAMINOPHEN 500 MG
1000 TABLET ORAL EVERY 6 HOURS
Status: DISCONTINUED | OUTPATIENT
Start: 2022-10-19 | End: 2022-10-20 | Stop reason: HOSPADM

## 2022-10-19 RX ORDER — PROCHLORPERAZINE EDISYLATE 5 MG/ML
10 INJECTION INTRAMUSCULAR; INTRAVENOUS ONCE AS NEEDED
Status: DISCONTINUED | OUTPATIENT
Start: 2022-10-19 | End: 2022-10-19 | Stop reason: HOSPADM

## 2022-10-19 RX ORDER — PANTOPRAZOLE SODIUM 40 MG/10ML
40 INJECTION, POWDER, LYOPHILIZED, FOR SOLUTION INTRAVENOUS ONCE
Status: COMPLETED | OUTPATIENT
Start: 2022-10-19 | End: 2022-10-19

## 2022-10-19 RX ORDER — DEXAMETHASONE SODIUM PHOSPHATE 4 MG/ML
INJECTION, SOLUTION INTRA-ARTICULAR; INTRALESIONAL; INTRAMUSCULAR; INTRAVENOUS; SOFT TISSUE AS NEEDED
Status: DISCONTINUED | OUTPATIENT
Start: 2022-10-19 | End: 2022-10-19 | Stop reason: SURG

## 2022-10-19 RX ORDER — SODIUM CHLORIDE 0.9 % (FLUSH) 0.9 %
10 SYRINGE (ML) INJECTION AS NEEDED
Status: DISCONTINUED | OUTPATIENT
Start: 2022-10-19 | End: 2022-10-19 | Stop reason: HOSPADM

## 2022-10-19 RX ORDER — ONDANSETRON 2 MG/ML
INJECTION INTRAMUSCULAR; INTRAVENOUS AS NEEDED
Status: DISCONTINUED | OUTPATIENT
Start: 2022-10-19 | End: 2022-10-19 | Stop reason: SURG

## 2022-10-19 RX ORDER — HYDROMORPHONE HYDROCHLORIDE 2 MG/1
2 TABLET ORAL EVERY 4 HOURS PRN
Status: DISCONTINUED | OUTPATIENT
Start: 2022-10-19 | End: 2022-10-20 | Stop reason: HOSPADM

## 2022-10-19 RX ORDER — INSULIN LISPRO 100 [IU]/ML
3-14 INJECTION, SOLUTION INTRAVENOUS; SUBCUTANEOUS
Status: DISCONTINUED | OUTPATIENT
Start: 2022-10-19 | End: 2022-10-20 | Stop reason: HOSPADM

## 2022-10-19 RX ORDER — BUPIVACAINE HYDROCHLORIDE AND EPINEPHRINE 2.5; 5 MG/ML; UG/ML
INJECTION, SOLUTION EPIDURAL; INFILTRATION; INTRACAUDAL; PERINEURAL AS NEEDED
Status: DISCONTINUED | OUTPATIENT
Start: 2022-10-19 | End: 2022-10-19 | Stop reason: HOSPADM

## 2022-10-19 RX ORDER — SODIUM CHLORIDE, SODIUM LACTATE, POTASSIUM CHLORIDE, CALCIUM CHLORIDE 600; 310; 30; 20 MG/100ML; MG/100ML; MG/100ML; MG/100ML
9 INJECTION, SOLUTION INTRAVENOUS CONTINUOUS PRN
Status: DISCONTINUED | OUTPATIENT
Start: 2022-10-19 | End: 2022-10-19 | Stop reason: HOSPADM

## 2022-10-19 RX ORDER — IPRATROPIUM BROMIDE AND ALBUTEROL SULFATE 2.5; .5 MG/3ML; MG/3ML
3 SOLUTION RESPIRATORY (INHALATION) ONCE AS NEEDED
Status: DISCONTINUED | OUTPATIENT
Start: 2022-10-19 | End: 2022-10-19 | Stop reason: HOSPADM

## 2022-10-19 RX ORDER — BUSPIRONE HYDROCHLORIDE 5 MG/1
15 TABLET ORAL AS NEEDED
Status: DISCONTINUED | OUTPATIENT
Start: 2022-10-19 | End: 2022-10-20 | Stop reason: HOSPADM

## 2022-10-19 RX ORDER — PROPOFOL 10 MG/ML
VIAL (ML) INTRAVENOUS AS NEEDED
Status: DISCONTINUED | OUTPATIENT
Start: 2022-10-19 | End: 2022-10-19 | Stop reason: SURG

## 2022-10-19 RX ORDER — ACETAMINOPHEN 160 MG/5ML
1000 SOLUTION ORAL EVERY 6 HOURS
Status: DISCONTINUED | OUTPATIENT
Start: 2022-10-19 | End: 2022-10-20 | Stop reason: HOSPADM

## 2022-10-19 RX ORDER — NICOTINE POLACRILEX 4 MG
15 LOZENGE BUCCAL
Status: DISCONTINUED | OUTPATIENT
Start: 2022-10-19 | End: 2022-10-20 | Stop reason: HOSPADM

## 2022-10-19 RX ORDER — ALBUTEROL SULFATE 90 UG/1
2 AEROSOL, METERED RESPIRATORY (INHALATION) EVERY 4 HOURS PRN
Status: DISCONTINUED | OUTPATIENT
Start: 2022-10-19 | End: 2022-10-20 | Stop reason: HOSPADM

## 2022-10-19 RX ORDER — ROPINIROLE 1 MG/1
2 TABLET, FILM COATED ORAL NIGHTLY
Status: DISCONTINUED | OUTPATIENT
Start: 2022-10-19 | End: 2022-10-20 | Stop reason: HOSPADM

## 2022-10-19 RX ORDER — FENTANYL CITRATE 50 UG/ML
25 INJECTION, SOLUTION INTRAMUSCULAR; INTRAVENOUS
Status: DISCONTINUED | OUTPATIENT
Start: 2022-10-19 | End: 2022-10-19 | Stop reason: HOSPADM

## 2022-10-19 RX ORDER — CHLORHEXIDINE GLUCONATE 0.12 MG/ML
15 RINSE ORAL SEE ADMIN INSTRUCTIONS
Status: COMPLETED | OUTPATIENT
Start: 2022-10-19 | End: 2022-10-19

## 2022-10-19 RX ORDER — ONDANSETRON 2 MG/ML
4 INJECTION INTRAMUSCULAR; INTRAVENOUS ONCE AS NEEDED
Status: DISCONTINUED | OUTPATIENT
Start: 2022-10-19 | End: 2022-10-19 | Stop reason: HOSPADM

## 2022-10-19 RX ORDER — METOCLOPRAMIDE HYDROCHLORIDE 5 MG/ML
10 INJECTION INTRAMUSCULAR; INTRAVENOUS EVERY 6 HOURS PRN
Status: DISCONTINUED | OUTPATIENT
Start: 2022-10-19 | End: 2022-10-20 | Stop reason: HOSPADM

## 2022-10-19 RX ADMIN — SODIUM CHLORIDE, POTASSIUM CHLORIDE, SODIUM LACTATE AND CALCIUM CHLORIDE 500 ML: 600; 310; 30; 20 INJECTION, SOLUTION INTRAVENOUS at 06:51

## 2022-10-19 RX ADMIN — ROPINIROLE HYDROCHLORIDE 2 MG: 1 TABLET, FILM COATED ORAL at 21:00

## 2022-10-19 RX ADMIN — HYDROMORPHONE HYDROCHLORIDE 2 MG: 2 TABLET ORAL at 15:56

## 2022-10-19 RX ADMIN — DILTIAZEM HYDROCHLORIDE 120 MG: 120 CAPSULE, COATED, EXTENDED RELEASE ORAL at 15:59

## 2022-10-19 RX ADMIN — PROPOFOL 50 MG: 10 INJECTION, EMULSION INTRAVENOUS at 07:58

## 2022-10-19 RX ADMIN — FENTANYL CITRATE 50 MCG: 50 INJECTION, SOLUTION INTRAMUSCULAR; INTRAVENOUS at 10:27

## 2022-10-19 RX ADMIN — SUGAMMADEX 200 MG: 100 INJECTION, SOLUTION INTRAVENOUS at 09:03

## 2022-10-19 RX ADMIN — SODIUM CHLORIDE, POTASSIUM CHLORIDE, SODIUM LACTATE AND CALCIUM CHLORIDE 150 ML/HR: 600; 310; 30; 20 INJECTION, SOLUTION INTRAVENOUS at 12:28

## 2022-10-19 RX ADMIN — HYDROMORPHONE HYDROCHLORIDE 1 MG: 1 INJECTION, SOLUTION INTRAMUSCULAR; INTRAVENOUS; SUBCUTANEOUS at 09:32

## 2022-10-19 RX ADMIN — PROPOFOL 150 MG: 10 INJECTION, EMULSION INTRAVENOUS at 07:52

## 2022-10-19 RX ADMIN — HYDROMORPHONE HYDROCHLORIDE 1 MG: 1 INJECTION, SOLUTION INTRAMUSCULAR; INTRAVENOUS; SUBCUTANEOUS at 10:04

## 2022-10-19 RX ADMIN — PANTOPRAZOLE SODIUM 40 MG: 40 INJECTION, POWDER, FOR SOLUTION INTRAVENOUS at 06:52

## 2022-10-19 RX ADMIN — ROCURONIUM BROMIDE 60 MG: 10 INJECTION, SOLUTION INTRAVENOUS at 07:52

## 2022-10-19 RX ADMIN — HYDROMORPHONE HYDROCHLORIDE 2 MG: 2 TABLET ORAL at 21:00

## 2022-10-19 RX ADMIN — INSULIN LISPRO 5 UNITS: 100 INJECTION, SOLUTION INTRAVENOUS; SUBCUTANEOUS at 22:58

## 2022-10-19 RX ADMIN — DEXAMETHASONE SODIUM PHOSPHATE 8 MG: 4 INJECTION, SOLUTION INTRAMUSCULAR; INTRAVENOUS at 08:07

## 2022-10-19 RX ADMIN — ACETAMINOPHEN 1000 MG: 500 TABLET ORAL at 13:18

## 2022-10-19 RX ADMIN — ONDANSETRON 4 MG: 2 INJECTION INTRAMUSCULAR; INTRAVENOUS at 08:06

## 2022-10-19 RX ADMIN — METOCLOPRAMIDE 10 MG: 5 INJECTION, SOLUTION INTRAMUSCULAR; INTRAVENOUS at 06:57

## 2022-10-19 RX ADMIN — THIAMINE HYDROCHLORIDE 100 ML/HR: 100 INJECTION, SOLUTION INTRAMUSCULAR; INTRAVENOUS at 23:00

## 2022-10-19 RX ADMIN — PROPOFOL INJECTABLE EMULSION 150 MCG/KG/MIN: 10 INJECTION, EMULSION INTRAVENOUS at 07:55

## 2022-10-19 RX ADMIN — SUGAMMADEX 200 MG: 100 INJECTION, SOLUTION INTRAVENOUS at 08:53

## 2022-10-19 RX ADMIN — FENTANYL CITRATE 100 MCG: 50 INJECTION, SOLUTION INTRAMUSCULAR; INTRAVENOUS at 07:52

## 2022-10-19 RX ADMIN — HYDROCODONE BITARTRATE AND ACETAMINOPHEN 1 TABLET: 7.5; 325 TABLET ORAL at 10:27

## 2022-10-19 RX ADMIN — INSULIN LISPRO 8 UNITS: 100 INJECTION, SOLUTION INTRAVENOUS; SUBCUTANEOUS at 17:46

## 2022-10-19 RX ADMIN — ACETAMINOPHEN 1000 MG: 500 TABLET ORAL at 19:15

## 2022-10-19 RX ADMIN — Medication 3 ML: at 21:01

## 2022-10-19 RX ADMIN — KETOROLAC TROMETHAMINE 30 MG: 30 INJECTION, SOLUTION INTRAMUSCULAR at 09:01

## 2022-10-19 RX ADMIN — AMITRIPTYLINE HYDROCHLORIDE 25 MG: 25 TABLET, FILM COATED ORAL at 21:00

## 2022-10-19 RX ADMIN — PANTOPRAZOLE SODIUM 40 MG: 40 TABLET, DELAYED RELEASE ORAL at 13:06

## 2022-10-19 RX ADMIN — SODIUM CHLORIDE, SODIUM LACTATE, POTASSIUM CHLORIDE, AND CALCIUM CHLORIDE: .6; .31; .03; .02 INJECTION, SOLUTION INTRAVENOUS at 07:42

## 2022-10-19 RX ADMIN — SODIUM CHLORIDE, POTASSIUM CHLORIDE, SODIUM LACTATE AND CALCIUM CHLORIDE 150 ML/HR: 600; 310; 30; 20 INJECTION, SOLUTION INTRAVENOUS at 17:47

## 2022-10-19 RX ADMIN — CHLORHEXIDINE GLUCONATE 15 ML: 1.2 SOLUTION ORAL at 06:23

## 2022-10-19 RX ADMIN — Medication 3 ML: at 13:07

## 2022-10-19 RX ADMIN — INSULIN LISPRO 8 UNITS: 100 INJECTION, SOLUTION INTRAVENOUS; SUBCUTANEOUS at 14:03

## 2022-10-19 RX ADMIN — GABAPENTIN 600 MG: 600 TABLET, FILM COATED ORAL at 21:00

## 2022-10-19 RX ADMIN — LOSARTAN POTASSIUM 100 MG: 50 TABLET, FILM COATED ORAL at 13:54

## 2022-10-19 RX ADMIN — PROPRANOLOL HYDROCHLORIDE 80 MG: 20 TABLET ORAL at 13:54

## 2022-10-19 RX ADMIN — CARIPRAZINE 1.5 MG: 1.5 CAPSULE, GELATIN COATED ORAL at 21:00

## 2022-10-19 RX ADMIN — SODIUM CHLORIDE, POTASSIUM CHLORIDE, SODIUM LACTATE AND CALCIUM CHLORIDE 100 ML/HR: 600; 310; 30; 20 INJECTION, SOLUTION INTRAVENOUS at 06:37

## 2022-10-19 RX ADMIN — Medication 3 G: at 07:51

## 2022-10-19 RX ADMIN — SCOPALAMINE 1 PATCH: 1 PATCH, EXTENDED RELEASE TRANSDERMAL at 06:23

## 2022-10-19 NOTE — ANESTHESIA POSTPROCEDURE EVALUATION
Patient: Gissel Amaro    Procedure Summary     Date: 10/19/22 Room / Location: [facility] OR 09 / [facility] MAIN OR    Anesthesia Start: 0741 Anesthesia Stop: 0922    Procedure: GASTRIC SLEEVE LAPAROSCOPIC WITH DAVINCI ROBOT (Abdomen) Diagnosis:       Body mass index (BMI) of 50-59.9 in adult (HCC)      (Body mass index (BMI) of 50-59.9 in adult (HCC) [Z68.43])    Surgeons: Gloria Marinelli MD Provider: Ruben Rockwell MD    Anesthesia Type: general, ERAS Protocol ASA Status: 3          Anesthesia Type: general, ERAS Protocol    Vitals  Vitals Value Taken Time   /80 10/19/22 1005   Temp 99 °F (37.2 °C) 10/19/22 0922   Pulse 90 10/19/22 1005   Resp 12 10/19/22 0937   SpO2 97 % 10/19/22 1005   Vitals shown include unvalidated device data.        Post Anesthesia Care and Evaluation    Patient location during evaluation: PACU  Patient participation: complete - patient participated  Level of consciousness: sleepy but conscious and responsive to verbal stimuli  Pain score: 0  Pain management: adequate    Airway patency: patent  Anesthetic complications: No anesthetic complications  PONV Status: none  Cardiovascular status: acceptable  Respiratory status: acceptable  Hydration status: acceptable

## 2022-10-19 NOTE — ANESTHESIA PREPROCEDURE EVALUATION
Anesthesia Evaluation     Patient summary reviewed and Nursing notes reviewed   NPO Solid Status: > 8 hours  NPO Liquid Status: > 2 hours           Airway   Mallampati: II  TM distance: >3 FB  Neck ROM: full  Large neck circumference and Possible difficult intubation  Dental - normal exam     Comment: Many teeth missing, none loose    Pulmonary - normal exam   (+) asthma,sleep apnea on CPAP,   Cardiovascular - normal exam    ECG reviewed    (+) hypertension well controlled, CAD, MAX, hyperlipidemia,     ROS comment: 2019 TTE:  EF 55-60%, nl valves  Stress test nl per patient    Neuro/Psych  (+) psychiatric history Anxiety and Depression,    GI/Hepatic/Renal/Endo    (+) morbid obesity, GERD,  diabetes mellitus well controlled using insulin,     Musculoskeletal (-) negative ROS    Abdominal   (+) obese,     Bowel sounds: normal.   Substance History - negative use     OB/GYN negative ob/gyn ROS         Other                      Anesthesia Plan    ASA 3     general and ERAS Protocol     (Scope patch, reglan, protonix ordered per surgeon. )  intravenous induction     Anesthetic plan, risks, benefits, and alternatives have been provided, discussed and informed consent has been obtained with: patient.    Plan discussed with CRNA and CAA.        CODE STATUS:

## 2022-10-19 NOTE — ANESTHESIA PROCEDURE NOTES
Airway  Urgency: elective    Airway not difficult    General Information and Staff    Patient location during procedure: OR  CRNA/CAA: Boy Juarez CRNA    Indications and Patient Condition  Indications for airway management: airway protection and CNS depression    Preoxygenated: yes  MILS maintained throughout  Mask difficulty assessment: 1 - vent by mask    Final Airway Details  Final airway type: endotracheal airway      Successful airway: ETT  Cuffed: yes   Successful intubation technique: direct laryngoscopy  Endotracheal tube insertion site: oral  Blade: Robert  Blade size: 3  ETT size (mm): 7.0  Cormack-Lehane Classification: grade I - full view of glottis  Placement verified by: chest auscultation and capnometry   Measured from: lips  Number of attempts at approach: 1  Assessment: lips, teeth, and gum same as pre-op and atraumatic intubation

## 2022-10-20 VITALS
WEIGHT: 281.2 LBS | RESPIRATION RATE: 18 BRPM | BODY MASS INDEX: 51.75 KG/M2 | HEIGHT: 62 IN | SYSTOLIC BLOOD PRESSURE: 124 MMHG | TEMPERATURE: 97.9 F | DIASTOLIC BLOOD PRESSURE: 69 MMHG | OXYGEN SATURATION: 98 % | HEART RATE: 62 BPM

## 2022-10-20 LAB
GLUCOSE BLDC GLUCOMTR-MCNC: 175 MG/DL (ref 70–105)
LAB AP CASE REPORT: NORMAL
PATH REPORT.FINAL DX SPEC: NORMAL
PATH REPORT.GROSS SPEC: NORMAL

## 2022-10-20 PROCEDURE — 94761 N-INVAS EAR/PLS OXIMETRY MLT: CPT

## 2022-10-20 PROCEDURE — 82962 GLUCOSE BLOOD TEST: CPT

## 2022-10-20 PROCEDURE — 25010000002 THIAMINE PER 100 MG: Performed by: SURGERY

## 2022-10-20 PROCEDURE — 94799 UNLISTED PULMONARY SVC/PX: CPT

## 2022-10-20 PROCEDURE — 63710000001 INSULIN LISPRO (HUMAN) PER 5 UNITS: Performed by: SURGERY

## 2022-10-20 PROCEDURE — 25010000002 CYANOCOBALAMIN PER 1000 MCG: Performed by: SURGERY

## 2022-10-20 PROCEDURE — 99024 POSTOP FOLLOW-UP VISIT: CPT | Performed by: SURGERY

## 2022-10-20 RX ORDER — ENOXAPARIN SODIUM 100 MG/ML
40 INJECTION SUBCUTANEOUS EVERY 12 HOURS SCHEDULED
Qty: 11.2 ML | Refills: 0 | Status: SHIPPED | OUTPATIENT
Start: 2022-10-20 | End: 2022-11-03

## 2022-10-20 RX ORDER — ONDANSETRON 8 MG/1
8 TABLET, ORALLY DISINTEGRATING ORAL EVERY 8 HOURS PRN
Qty: 30 TABLET | Refills: 5 | Status: SHIPPED | OUTPATIENT
Start: 2022-10-20 | End: 2022-10-20 | Stop reason: SDUPTHER

## 2022-10-20 RX ORDER — HYDROMORPHONE HYDROCHLORIDE 2 MG/1
2 TABLET ORAL EVERY 6 HOURS PRN
Qty: 18 TABLET | Refills: 0 | Status: SHIPPED | OUTPATIENT
Start: 2022-10-20 | End: 2022-10-21

## 2022-10-20 RX ORDER — ENOXAPARIN SODIUM 100 MG/ML
40 INJECTION SUBCUTANEOUS EVERY 12 HOURS SCHEDULED
Qty: 11.2 ML | Refills: 0 | Status: SHIPPED | OUTPATIENT
Start: 2022-10-20 | End: 2022-10-20 | Stop reason: SDUPTHER

## 2022-10-20 RX ORDER — ONDANSETRON 8 MG/1
8 TABLET, ORALLY DISINTEGRATING ORAL EVERY 8 HOURS PRN
Qty: 30 TABLET | Refills: 5 | Status: SHIPPED | OUTPATIENT
Start: 2022-10-20 | End: 2023-01-16

## 2022-10-20 RX ADMIN — HYDROMORPHONE HYDROCHLORIDE 2 MG: 2 TABLET ORAL at 08:14

## 2022-10-20 RX ADMIN — SODIUM CHLORIDE, POTASSIUM CHLORIDE, SODIUM LACTATE AND CALCIUM CHLORIDE 150 ML/HR: 600; 310; 30; 20 INJECTION, SOLUTION INTRAVENOUS at 09:07

## 2022-10-20 RX ADMIN — DILTIAZEM HYDROCHLORIDE 120 MG: 120 CAPSULE, COATED, EXTENDED RELEASE ORAL at 08:15

## 2022-10-20 RX ADMIN — HYDROMORPHONE HYDROCHLORIDE 2 MG: 2 TABLET ORAL at 01:02

## 2022-10-20 RX ADMIN — CYANOCOBALAMIN 1000 MCG: 1000 INJECTION, SOLUTION INTRAMUSCULAR at 08:15

## 2022-10-20 RX ADMIN — PROPRANOLOL HYDROCHLORIDE 80 MG: 20 TABLET ORAL at 08:15

## 2022-10-20 RX ADMIN — ACETAMINOPHEN 1000 MG: 500 TABLET ORAL at 08:14

## 2022-10-20 RX ADMIN — LOSARTAN POTASSIUM 25 MG: 25 TABLET, FILM COATED ORAL at 08:16

## 2022-10-20 RX ADMIN — INSULIN LISPRO 3 UNITS: 100 INJECTION, SOLUTION INTRAVENOUS; SUBCUTANEOUS at 08:16

## 2022-10-20 RX ADMIN — ACETAMINOPHEN 1000 MG: 500 TABLET ORAL at 01:00

## 2022-10-20 RX ADMIN — PANTOPRAZOLE SODIUM 40 MG: 40 TABLET, DELAYED RELEASE ORAL at 08:14

## 2022-10-21 ENCOUNTER — READMISSION MANAGEMENT (OUTPATIENT)
Dept: CALL CENTER | Facility: HOSPITAL | Age: 52
End: 2022-10-21

## 2022-10-21 RX ORDER — HYDROMORPHONE HYDROCHLORIDE 2 MG/1
2 TABLET ORAL EVERY 4 HOURS PRN
Qty: 10 TABLET | Refills: 0 | Status: SHIPPED | OUTPATIENT
Start: 2022-10-21 | End: 2022-11-21

## 2022-10-21 NOTE — OUTREACH NOTE
Prep Survey    Flowsheet Row Responses   [facility] patient discharged from? [facility]   Is LACE score < 7 ? No   Emergency Room discharge w/ pulse ox? No   Eligibility Readm Mgmt   Discharge diagnosis s/p Robotic Laparoscopic sleeve gastrectomy   Does the patient have one of the following disease processes/diagnoses(primary or secondary)? General Surgery   Does the patient have Home health ordered? No   Is there a DME ordered? No   Prep survey completed? Yes          GERMAINE HENDRICKSON - Registered Nurse

## 2022-10-24 ENCOUNTER — READMISSION MANAGEMENT (OUTPATIENT)
Dept: CALL CENTER | Facility: HOSPITAL | Age: 52
End: 2022-10-24

## 2022-10-24 ENCOUNTER — OFFICE VISIT (OUTPATIENT)
Dept: BARIATRICS/WEIGHT MGMT | Facility: CLINIC | Age: 52
End: 2022-10-24

## 2022-10-24 VITALS
WEIGHT: 263.2 LBS | OXYGEN SATURATION: 94 % | SYSTOLIC BLOOD PRESSURE: 153 MMHG | BODY MASS INDEX: 49.69 KG/M2 | HEIGHT: 61 IN | DIASTOLIC BLOOD PRESSURE: 94 MMHG | HEART RATE: 80 BPM | TEMPERATURE: 97.1 F | RESPIRATION RATE: 15 BRPM

## 2022-10-24 PROCEDURE — 99024 POSTOP FOLLOW-UP VISIT: CPT | Performed by: SURGERY

## 2022-10-24 NOTE — OUTREACH NOTE
General Surgery Week 1 Survey    Flowsheet Row Responses   [facility] patient discharged from? [facility]   Does the patient have one of the following disease processes/diagnoses(primary or secondary)? General Surgery   Week 1 attempt successful? Yes   Call start time 1547   Call end time 1549   Person spoke with today (if not patient) and relationship patient   Meds reviewed with patient/caregiver? Yes   Is the patient having any side effects they believe may be caused by any medication additions or changes? No   Does the patient have all medications related to this admission filled (includes all antibiotics, pain medications, etc.) Yes   Is the patient taking all medications as directed (includes completed medication regime)? Yes   Does the patient have a follow up appointment scheduled with their surgeon? Yes   Has the patient kept scheduled appointments due by today? Yes   Comments Pt saw surgeon today   Psychosocial issues? No   Did the patient receive a copy of their discharge instructions? Yes   Nursing interventions Reviewed instructions with patient   What is the patient's perception of their health status since discharge? Improving   Is the patient/caregiver able to teach back signs and symptoms of incisional infection? Increased redness, swelling or pain at the incisonal site, Increased drainage or bleeding, Incisional warmth, Pus or odor from incision, Fever   Is the patient/caregiver able to teach back steps to recovery at home? Set small, achievable goals for return to baseline health, Rest and rebuild strength, gradually increase activity   If the patient is a current smoker, are they able to teach back resources for cessation? Not a smoker   Is the patient/caregiver able to teach back the hierarchy of who to call/visit for symptoms/problems? PCP, Specialist, Home health nurse, Urgent Care, ED, 911 Yes   Week 1 call completed? Yes   Wrap up additional comments Pt reports she is doing well.,  She  denies needs at this visit            STEWART WISE - Registered Nurse

## 2022-10-24 NOTE — PROGRESS NOTES
[facility]K BAR SURG [facility] BARIATRIC SURGERY  [address] [place] [address]  [address] [place] [address]  Dept: [phone]  10/24/2022      Gissel Amaro.  [id number]  [id number]  1970  female      Chief Complaint   Patient presents with   • Post-op     1 wk Gastric Sleeve     Gastric Sleeve Laparoscopic With Davinci Robot  10/19/2022    BH Post-Op Bariatric Surgery:     HPI:   Weight loss in the last week:    Wt Readings from Last 10 Encounters:   10/24/22 119 kg (263 lb 3.2 oz)   10/19/22 128 kg (281 lb 3.2 oz)   09/30/22 128 kg (281 lb 3.2 oz)   09/01/22 125 kg (276 lb 9.6 oz)   08/08/22 125 kg (275 lb 3.2 oz)   07/14/22 124 kg (272 lb 6.4 oz)   06/17/22 123 kg (270 lb 6.4 oz)   05/31/22 122 kg (268 lb)   05/18/22 122 kg (269 lb)   04/26/22 124 kg (272 lb 9.6 oz)       Review of Systems  No dysphagia no nausea no vomiting  No shortness of breath no chest pain  No abdominal pain  No extremity pain or swelling    Patient Active Problem List   Diagnosis   • Benign hypertension   • Small vessel coronary artery disease   • Diabetes mellitus (HCC)   • Edema   • Mixed hyperlipidemia   • CHF (congestive heart failure) ([facility])   • Morbid obesity ([facility])   • Obstructive sleep apnea syndrome   • BMI 45.0-49.9, adult ([facility])   • Pre-operative examination   • Obesity, Class III, BMI 40-49.9 (morbid obesity) ([facility])       The following portions of the patient's history were reviewed and updated as appropriate: allergies, current medications, past family history, past medical history, past social history, past surgical history, and problem list.    Vitals:    10/24/22 1030   BP: 153/94   Pulse: 80   Resp: 15   Temp: 97.1 °F (36.2 °C)   SpO2: 94%       Physical Exam  Awake and alert  Normal pulmonary effort  Incisions with no erythema and appropriate abdominal tenderness  Extremities with no tenderness and no swelling    Assessment:   Patient Active Problem List    Diagnosis   • Benign hypertension   • Small vessel coronary artery disease   • Diabetes mellitus (HCC)   • Edema   • Mixed hyperlipidemia   • CHF (congestive heart failure) (HCC)   • Morbid obesity (HCC)   • Obstructive sleep apnea syndrome   • BMI 45.0-49.9, adult (HCC)   • Pre-operative examination   • Obesity, Class III, BMI 40-49.9 (morbid obesity) (HCC)       Post-op, the patient is doing well.     Plan:   Reviewed with patient the importance of following the manual for diet progression. Increase activity as tolerated. Continue increasing daily intake of protein and water.   Return to work: the patient is to return to 3 weeks from their surgery date with no restrictions unless they develop medical problems in which we will see them back in the office. They received a note in our office today with their return to work date.  Activity restrictions: no lifting, pushing or pulling over 25lbs for 3 weeks.   Recommended patient be sure to get at least 70 grams of protein per day. Discussed with the patient the recommended amount of water per day to intake. Reviewed vitamin requirements. Be sure to do routine exercise and increase activity as tolerated. No asa, nsaids or steroids for 8 weeks. Patient may use miralax as needed if necessary.     Instructions / Recommendations: dietary counseling recommended, recommended a daily protein intake of  grams, vitamin supplement(s) recommended, recommended exercising at least 150 minutes per week, behavior modifications recommended and instructed to call the office for concerns, questions, or problems.     The patient was instructed to follow up at one month follow up appt.     The patient was counseled regarding post op bariatric manual  Answers for HPI/ROS submitted by the patient on 10/20/2022  Please describe your symptoms.: None  Have you had these symptoms before?: No  How long have you been having these symptoms?: 1-4 days  Please list any medications you are  currently taking for this condition.: None  Please describe any probable cause for these symptoms. : None  What is the primary reason for your visit?: Other

## 2022-10-31 ENCOUNTER — OFFICE (AMBULATORY)
Dept: URBAN - METROPOLITAN AREA CLINIC 64 | Facility: CLINIC | Age: 52
End: 2022-10-31
Payer: OTHER GOVERNMENT

## 2022-10-31 VITALS
WEIGHT: 256 LBS | HEART RATE: 76 BPM | DIASTOLIC BLOOD PRESSURE: 76 MMHG | HEIGHT: 62 IN | SYSTOLIC BLOOD PRESSURE: 126 MMHG

## 2022-10-31 DIAGNOSIS — K21.9 GASTRO-ESOPHAGEAL REFLUX DISEASE WITHOUT ESOPHAGITIS: ICD-10-CM

## 2022-10-31 PROCEDURE — 99212 OFFICE O/P EST SF 10 MIN: CPT | Performed by: NURSE PRACTITIONER

## 2022-11-01 ENCOUNTER — READMISSION MANAGEMENT (OUTPATIENT)
Dept: CALL CENTER | Facility: HOSPITAL | Age: 52
End: 2022-11-01

## 2022-11-01 NOTE — OUTREACH NOTE
General Surgery Week 2 Survey    Flowsheet Row Responses   Worship facility patient discharged from? [facility]   Does the patient have one of the following disease processes/diagnoses(primary or secondary)? General Surgery   Week 2 attempt successful? No   Unsuccessful attempts Attempt 1          SOM Loza Registered Nurse

## 2022-11-04 NOTE — PROGRESS NOTES
"Enter Query Response Below      Query Response:   Unable to determine  Electronically signed by Gloria Marinelli MD, 22, 3:45 PM EDT.             If applicable, please update the problem list.     Patient: Gissel Amaro        : 1970  Account: [id number]           Admit Date: 10/19/2022        How to Respond to this query:       a. Click New Note     b. Answer query within the yellow box.                c. Update the Problem List, if applicable.      If you have any questions about this query contact me at: [email or web address]    Dr. Marinelli    52 year old male with history of CHF per the problem list on the H&P. Anesthesia notes \"ROS comment: 2019 TTE: EF 55-60%, nl valves\". Treatment- losartan, propranolol    Please clarify the type of heart failure treated/monitored:  heart failure with preserved ejection fraction  other- specify_____  unable to determine    By submitting this query, we are merely seeking further clarification of documentation to accurately reflect all conditions that you are monitoring, evaluating, treating or that extend the hospitalization or utilize additional resources of care. Please utilize your independent clinical judgment when addressing the question(s) above.     This query and your response, once completed, will be entered into the legal medical record.    Sincerely,  Emerald Patel  Clinical Documentation Integrity Program   "

## 2022-11-21 ENCOUNTER — OFFICE VISIT (OUTPATIENT)
Dept: BARIATRICS/WEIGHT MGMT | Facility: CLINIC | Age: 52
End: 2022-11-21

## 2022-11-21 VITALS
HEIGHT: 61 IN | BODY MASS INDEX: 48.64 KG/M2 | DIASTOLIC BLOOD PRESSURE: 83 MMHG | HEART RATE: 73 BPM | WEIGHT: 257.6 LBS | SYSTOLIC BLOOD PRESSURE: 149 MMHG | OXYGEN SATURATION: 95 % | RESPIRATION RATE: 16 BRPM

## 2022-11-21 PROCEDURE — 99024 POSTOP FOLLOW-UP VISIT: CPT | Performed by: SURGERY

## 2022-11-21 NOTE — PROGRESS NOTES
[facility] BAR SURG [facility] BARIATRIC SURGERY  [address] [place] [address]  [address] [place] [address]  Dept: [phone]  11/21/2022      Gissel Amaro.  [id number]  [id number]  1970  female    Date of last surgery: 10/19/2022Gastric Sleeve Laparoscopic With Davinci Robot      Chief Complaint: BH Post-Op Bariatric Surgery:   Gissel Amaro is status post procedure listed above  HPI:     Wt Readings from Last 10 Encounters:   11/21/22 117 kg (257 lb 9.6 oz)   10/24/22 119 kg (263 lb 3.2 oz)   10/19/22 128 kg (281 lb 3.2 oz)   09/30/22 128 kg (281 lb 3.2 oz)   09/01/22 125 kg (276 lb 9.6 oz)   08/08/22 125 kg (275 lb 3.2 oz)   07/14/22 124 kg (272 lb 6.4 oz)   06/17/22 123 kg (270 lb 6.4 oz)   05/31/22 122 kg (268 lb)   05/18/22 122 kg (269 lb)        Today's weight is 117 kg (257 lb 9.6 oz) pounds,@,@ has a  loss of 6 pounds since the last visit and@ weight loss since surgery is 24 pounds. The patient reports a decreased portion size and loss of appetite.      Gissel Amaro denies reflux/heartburn     Diet and Exercise: Diet history reviewed and discussed with the patient. Weight loss/gains to date discussed with the patient.     She reports eating 3 meals per day, a typical portion size of 1 cup, eating 0 snacks per day, drinking 8 or more 8-oz. glasses of water per day, no carbonated beverage consumption and exercising regularly.       The patient states they are eating 60 grams of protein per day.           Review of Systems    Review of Systems   Constitutional: Negative.    HENT: Negative.    Eyes: Negative.    Respiratory: Negative.    Cardiovascular: Negative.    Gastrointestinal: Negative.    Endocrine: Negative.    Genitourinary: Negative.    Musculoskeletal: Negative.    Skin: Negative.    Allergic/Immunologic: Negative.    Neurological: Negative.    Hematological: Negative.    Psychiatric/Behavioral: Negative.    Patient Active Problem  List   Diagnosis   • Benign hypertension   • Small vessel coronary artery disease   • Diabetes mellitus (HCC)   • Edema   • Mixed hyperlipidemia   • CHF (congestive heart failure) (HCC)   • Morbid obesity (HCC)   • Obstructive sleep apnea syndrome   • BMI 45.0-49.9, adult (HCC)   • Pre-operative examination   • Obesity, Class III, BMI 40-49.9 (morbid obesity) (HCC)       Past Medical History:   Diagnosis Date   • Anxiety    • Asthma    • CAD (coronary artery disease)    • Depression    • Diabetes (HCC)    • MAX (dyspnea on exertion)    • Fibromyalgia    • GERD (gastroesophageal reflux disease)    • HTN (hypertension)    • Hyperlipemia    • IBS (irritable bowel syndrome)    • Plantar fasciitis    • Sleep apnea     cpap     Past Surgical History:   Procedure Laterality Date   • TONSILLECTOMY  1976   • LAPAROSCOPIC TUBAL LIGATION  2000   • GALLBLADDER SURGERY  2010   • HYSTERECTOMY  2013   • KNEE SURGERY Right 2017   • COLONOSCOPY  2017   • ENDOSCOPY  2021   • COLONOSCOPY W/ BIOPSIES AND POLYPECTOMY  06/15/2022    5 polyps removed   • GASTRIC SLEEVE LAPAROSCOPIC N/A 10/19/2022    Procedure: GASTRIC SLEEVE LAPAROSCOPIC WITH DAVINCI ROBOT;  Surgeon: Gloria Marinelli MD;  Location: [facility];  Service: Robotics - DaVinci;  Laterality: N/A;   • BREAST SURGERY Bilateral     2015, 2020   • CARDIAC CATHETERIZATION        The following portions of the patient's history were reviewed and updated as appropriate: allergies, current medications, past family history, past medical history, past social history, past surgical history and problem list.    Vitals:    11/21/22 1021   BP: 149/83   Pulse: 73   Resp: 16   SpO2: 95%       Physical Exam  Awake and alert  Normal mental status  Normal pulmonary effort  Abdomen appropriate tenderness  Incisions no erythema  Extremities no tenderness or swelling      Assessment:   Patient Active Problem List   Diagnosis   • Benign hypertension   • Small vessel coronary artery disease   •  Diabetes mellitus (HCC)   • Edema   • Mixed hyperlipidemia   • CHF (congestive heart failure) (HCC)   • Morbid obesity (HCC)   • Obstructive sleep apnea syndrome   • BMI 45.0-49.9, adult (HCC)   • Pre-operative examination   • Obesity, Class III, BMI 40-49.9 (morbid obesity) (HCC)       Post-op, the patient is doing well.     Plan:     Encouraged patient to be sure to get plenty of lean protein per day through small frequent meals all with a protein source.   Activity restrictions: none.   Recommended patient be sure to get at least 70 grams of protein per day by eating small, frequent meals all with high lean protein choices. Be sure to limit/cut back on daily carbohydrate intake. Discussed with the patient the recommended amount of water per day to intake- half of body weight in ounces. Reviewed vitamin requirements. Be sure to do routine exercise, 150 minutes per week minimum, including both cardio and strength training.     Instructions / Recommendations: dietary counseling recommended, recommended a daily protein intake of  grams, vitamin supplement(s) recommended, recommended exercising at least 150 minutes per week, behavior modifications recommended and instructed to call the office for concerns, questions, or problems.     The patient was instructed to follow up in 2 months.     The patient was counseled regarding. Total time spent face to face was 15 minutes and 15 minutes was spent counseling.

## 2022-12-06 ENCOUNTER — OFFICE VISIT (OUTPATIENT)
Dept: CARDIOLOGY | Facility: CLINIC | Age: 52
End: 2022-12-06

## 2022-12-06 VITALS
DIASTOLIC BLOOD PRESSURE: 83 MMHG | HEART RATE: 84 BPM | HEIGHT: 61 IN | BODY MASS INDEX: 48.33 KG/M2 | OXYGEN SATURATION: 93 % | SYSTOLIC BLOOD PRESSURE: 124 MMHG | WEIGHT: 256 LBS

## 2022-12-06 DIAGNOSIS — E11.9 TYPE 2 DIABETES MELLITUS WITHOUT COMPLICATION, WITHOUT LONG-TERM CURRENT USE OF INSULIN: ICD-10-CM

## 2022-12-06 DIAGNOSIS — G47.33 OBSTRUCTIVE SLEEP APNEA SYNDROME: ICD-10-CM

## 2022-12-06 DIAGNOSIS — I25.10 SMALL VESSEL CORONARY ARTERY DISEASE: ICD-10-CM

## 2022-12-06 DIAGNOSIS — E78.2 MIXED HYPERLIPIDEMIA: Primary | ICD-10-CM

## 2022-12-06 DIAGNOSIS — I10 BENIGN HYPERTENSION: ICD-10-CM

## 2022-12-06 PROCEDURE — 99214 OFFICE O/P EST MOD 30 MIN: CPT | Performed by: NURSE PRACTITIONER

## 2022-12-06 NOTE — PROGRESS NOTES
[facility] CARDIOLOGY      REASON FOR FOLLOW-UP:  Coronary artery disease  Hypertension  Hypertensive cardiovascular disease          Chief Complaint   Patient presents with   • Heart Problem         Dear Sony Wong MD        History of Present Illness   It was my pleasure to see Gissel in the office today.  She is a 52-year-old female with known history of coronary artery disease per heart catheterization performed 12/12/2018 in which she was found to have small vessel disease in the distal LAD with elevated LVEDP.  Left ventricular ejection fraction low normal at 50%.  She has additional history that includes diabetes mellitus 2, hypertension with hypertensive cardiovascular disease, dyslipidemia, history of lower extremity DVT on anticoagulation prior, history of heart failure with preserved ejection fraction.  2D echo performed 9/9/2020 with normal LV systolic function with EF 60-65%.  Trace TR/AR.  Normal pulmonary artery pressure.  She presents today in follow-up for the above-mentioned diagnoses.    Gissel underwent gastric sleeve bariatric surgery 10/19/2022 and reports that she is doing very well.  She has lost approximately 30 pounds and improved her diet.  Her Lasix has been discontinued.  She denies any actual chest pain, pressure, tightness or palpitations.  No lower extremity edema or claudication.  She stated that she just returned from [place] in which she did quite a bit of walking without any problems.  Her blood pressure is under excellent control at 124/83.      ASSESSMENT:  Coronary artery disease- small vessel  History of heart failure with preserved ejection fraction  Hypertension with hypertensive cardiovascular disease  Dyslipidemia  Chronic kidney disease  History of lower extremity DVT  Morbid obesity with BMI 51.76 status post gastric sleeve surgery 10/19/2022  Trivial valvular heart disease    PLAN:  Continue current CV plan of care  Continue  risk factor modifications including heart healthy diet, exercise and continued weight loss  I will send prescription for Repatha to [facility] specialty pharmacy.  Patient to notify our office if it is approved  Follow-up in 6 months or sooner if needed        The following portions of the patient's history were reviewed and updated as appropriate: allergies, current medications, past family history, past medical history, past social history, past surgical history and problem list.    REVIEW OF SYSTEMS:    Review of Systems   All other systems reviewed and are negative.      Vitals:    12/06/22 0950   BP: 124/83   Pulse: 84   SpO2: 93%         PHYSICAL EXAM:    General: Alert, cooperative, no distress, appears stated age  Head:  Normocephalic, atraumatic, mucous membranes moist  Eyes:  Conjunctiva/corneas clear, EOM's intact     Neck:  Supple,  no JVD or bruit     Lungs: Clear to auscultation bilaterally, no wheezes rhonchi rales are noted  Chest wall: No tenderness  Musculoskeletal:   Ambulates freely without assistance  Heart::  Regular rate and rhythm, S1 and S2 normal, no murmur, rub or gallop  Abdomen: Soft, non-tender, nondistended, bowel sounds active, no abdominal bruit  Extremities: No cyanosis, clubbing, or edema   Pulses: 2+ and symmetric all extremities  Skin:  No rashes or lesions  Neuro/psych: A&O x3. CN II through XII are grossly intact with appropriate affect        Past Medical History:   Diagnosis Date   • Anxiety    • Asthma    • CAD (coronary artery disease)    • Depression    • Diabetes (HCC)    • MAX (dyspnea on exertion)    • Fibromyalgia    • GERD (gastroesophageal reflux disease)    • HTN (hypertension)    • Hyperlipemia    • IBS (irritable bowel syndrome)    • Plantar fasciitis    • Sleep apnea     cpap       Past Surgical History:   Procedure Laterality Date   • BREAST SURGERY Bilateral     2015, 2020   • CARDIAC CATHETERIZATION     • COLONOSCOPY  2017   • COLONOSCOPY W/ BIOPSIES AND  POLYPECTOMY  06/15/2022    5 polyps removed   • ENDOSCOPY  2021   • GALLBLADDER SURGERY  2010   • GASTRIC SLEEVE LAPAROSCOPIC N/A 10/19/2022    Procedure: GASTRIC SLEEVE LAPAROSCOPIC WITH [employer] ROBOT;  Surgeon: Gloria Marinelli MD;  Location: [facility] MAIN OR;  Service: Robotics - [employer];  Laterality: N/A;   • HYSTERECTOMY  2013   • KNEE SURGERY Right 2017   • LAPAROSCOPIC TUBAL LIGATION  2000   • TONSILLECTOMY  1976         Current Outpatient Medications:   •  albuterol sulfate  (90 Base) MCG/ACT inhaler, Inhale 2 puffs Every 4 (Four) Hours As Needed., Disp: , Rfl:   •  amitriptyline (ELAVIL) 25 MG tablet, Take 25 mg by mouth Every Night., Disp: , Rfl:   •  busPIRone (BUSPAR) 15 MG tablet, Take 1 tablet by mouth As Needed (anxiety)., Disp: , Rfl:   •  Cariprazine HCl (VRAYLAR) 1.5 MG capsule capsule, Take 1.5 mg by mouth Every Night., Disp: , Rfl:   •  Continuous Blood Gluc Sensor (World BXStyle Sushma 14 Day Sensor) misc, replace 1 sensor every 14 days, Disp: , Rfl:   •  Dapagliflozin Propanediol (Farxiga) 10 MG tablet, Take 10 mg by mouth Every Night., Disp: , Rfl:   •  dilTIAZem LA (Cardizem LA) 120 MG 24 hr tablet, Take 120 mg by mouth Daily., Disp: , Rfl:   •  doxepin (SINEquan) 25 MG capsule, Take 25 mg by mouth Every Night., Disp: , Rfl:   •  fenofibrate (TRICOR) 145 MG tablet, Take 145 mg by mouth Daily., Disp: , Rfl:   •  Fluticasone-Umeclidin-Vilant (Trelegy Ellipta) 100-62.5-25 MCG/INH inhaler, Trelegy Ellipta 100 mcg-62.5 mcg-25 mcg powder for inhalation  INHALE 1 PUFF BY MOUTH EVERY DAY, Disp: , Rfl:   •  gabapentin (NEURONTIN) 600 MG tablet, Take 1 tablet by mouth 3 (Three) Times a Day As Needed., Disp: , Rfl:   •  glucose blood test strip, Accu-Chek Guide test strips, Disp: , Rfl:   •  HYDROcodone-acetaminophen (NORCO)  MG per tablet, Take 1 tablet by mouth Every 8 (Eight) Hours As Needed (chronic back pain)., Disp: , Rfl:   •  Insulin Pen Needle 32G X 6 MM misc, BD Ultra-Fine Mini Pen Needle 31  "gauge x 3/16\"  USE TWICE DAILY FOR INSULIN AS DIRECTED BY YOUR DOCTOR, Disp: , Rfl:   •  losartan (COZAAR) 25 MG tablet, Take 100 mg by mouth Daily., Disp: , Rfl:   •  ondansetron (ZOFRAN) 8 MG tablet, Take 8 mg by mouth Every 8 (Eight) Hours As Needed for Nausea or Vomiting., Disp: , Rfl:   •  ondansetron ODT (Zofran ODT) 8 MG disintegrating tablet, Place 1 tablet on the tongue Every 8 (Eight) Hours As Needed for Nausea or Vomiting., Disp: 30 tablet, Rfl: 5  •  pantoprazole (PROTONIX) 40 MG EC tablet, Take 40 mg by mouth Daily., Disp: , Rfl: 5  •  propranolol (INDERAL) 80 MG tablet, Take 80 mg by mouth Daily., Disp: , Rfl:   •  rOPINIRole (REQUIP) 2 MG tablet, Take 2 mg by mouth Every Night., Disp: , Rfl:   •  sertraline (ZOLOFT) 100 MG tablet, Take 100 mg by mouth 2 (Two) Times a Day., Disp: , Rfl:   •  simvastatin (ZOCOR) 40 MG tablet, Take 80 mg by mouth Every Night., Disp: , Rfl:   •  SUMAtriptan (IMITREX) 100 MG tablet, Take 100 mg by mouth Every 2 (Two) Hours As Needed for Migraine. Take one tablet at onset of headache. May repeat dose one time in 2 hours if headache not relieved., Disp: , Rfl:   •  TiZANidine (ZANAFLEX) 4 MG capsule, Take 1 capsule by mouth 3 (Three) Times a Day As Needed for Muscle Spasms., Disp: , Rfl:   •  vitamin D (ERGOCALCIFEROL) 1.25 MG (40826 UT) capsule capsule, Take 1 capsule by mouth 1 (One) Time Per Week. Wednesdays, Disp: , Rfl:     Allergies   Allergen Reactions   • Trulicity [Dulaglutide] Other (See Comments)     Caused pancreatitis   • Penicillins Rash     Childhood Rx       Family History   Problem Relation Age of Onset   • Cancer Mother 60        Breast   • COPD Father 64   • Heart disease Father    • Obesity Maternal Grandmother        Social History     Tobacco Use   • Smoking status: Never   • Smokeless tobacco: Never   Substance Use Topics   • Alcohol use: Never           Current Electrocardiogram:  Procedures        EMR Dragon/Transcription:   \"Dictated utilizing " "Dragon dictation\".       "

## 2022-12-26 ENCOUNTER — LAB (OUTPATIENT)
Dept: LAB | Facility: HOSPITAL | Age: 52
End: 2022-12-26

## 2022-12-26 LAB
ALBUMIN SERPL-MCNC: 4.1 G/DL (ref 3.5–5.2)
ALBUMIN/GLOB SERPL: 1.7 G/DL
ALP SERPL-CCNC: 74 U/L (ref 39–117)
ALT SERPL W P-5'-P-CCNC: 17 U/L (ref 1–33)
ANION GAP SERPL CALCULATED.3IONS-SCNC: 11 MMOL/L (ref 5–15)
AST SERPL-CCNC: 17 U/L (ref 1–32)
BASOPHILS # BLD AUTO: 0.03 10*3/MM3 (ref 0–0.2)
BASOPHILS NFR BLD AUTO: 0.6 % (ref 0–1.5)
BILIRUB SERPL-MCNC: 0.9 MG/DL (ref 0–1.2)
BUN SERPL-MCNC: 24 MG/DL (ref 6–20)
BUN/CREAT SERPL: 13.9 (ref 7–25)
CALCIUM SPEC-SCNC: 9.8 MG/DL (ref 8.6–10.5)
CHLORIDE SERPL-SCNC: 106 MMOL/L (ref 98–107)
CO2 SERPL-SCNC: 23 MMOL/L (ref 22–29)
CREAT SERPL-MCNC: 1.73 MG/DL (ref 0.57–1)
DEPRECATED RDW RBC AUTO: 42.9 FL (ref 37–54)
EGFRCR SERPLBLD CKD-EPI 2021: 35 ML/MIN/1.73
EOSINOPHIL # BLD AUTO: 0.14 10*3/MM3 (ref 0–0.4)
EOSINOPHIL NFR BLD AUTO: 3 % (ref 0.3–6.2)
ERYTHROCYTE [DISTWIDTH] IN BLOOD BY AUTOMATED COUNT: 14.3 % (ref 12.3–15.4)
GLOBULIN UR ELPH-MCNC: 2.4 GM/DL
GLUCOSE SERPL-MCNC: 230 MG/DL (ref 65–99)
HCT VFR BLD AUTO: 41.2 % (ref 34–46.6)
HGB BLD-MCNC: 13.1 G/DL (ref 12–15.9)
IRON 24H UR-MRATE: 73 MCG/DL (ref 37–145)
LYMPHOCYTES # BLD AUTO: 1.87 10*3/MM3 (ref 0.7–3.1)
LYMPHOCYTES NFR BLD AUTO: 40 % (ref 19.6–45.3)
MCH RBC QN AUTO: 26.6 PG (ref 26.6–33)
MCHC RBC AUTO-ENTMCNC: 31.8 G/DL (ref 31.5–35.7)
MCV RBC AUTO: 83.7 FL (ref 79–97)
MONOCYTES # BLD AUTO: 0.29 10*3/MM3 (ref 0.1–0.9)
MONOCYTES NFR BLD AUTO: 6.2 % (ref 5–12)
NEUTROPHILS NFR BLD AUTO: 2.32 10*3/MM3 (ref 1.7–7)
NEUTROPHILS NFR BLD AUTO: 49.8 % (ref 42.7–76)
PLATELET # BLD AUTO: 141 10*3/MM3 (ref 140–450)
PMV BLD AUTO: 10.6 FL (ref 6–12)
POTASSIUM SERPL-SCNC: 4.1 MMOL/L (ref 3.5–5.2)
PREALB SERPL-MCNC: 25.3 MG/DL (ref 20–40)
PROT SERPL-MCNC: 6.5 G/DL (ref 6–8.5)
RBC # BLD AUTO: 4.92 10*6/MM3 (ref 3.77–5.28)
SODIUM SERPL-SCNC: 140 MMOL/L (ref 136–145)
WBC NRBC COR # BLD: 4.67 10*3/MM3 (ref 3.4–10.8)

## 2022-12-26 PROCEDURE — 84134 ASSAY OF PREALBUMIN: CPT

## 2022-12-26 PROCEDURE — 83540 ASSAY OF IRON: CPT

## 2022-12-26 PROCEDURE — 83921 ORGANIC ACID SINGLE QUANT: CPT

## 2022-12-26 PROCEDURE — 84425 ASSAY OF VITAMIN B-1: CPT

## 2022-12-26 PROCEDURE — 36415 COLL VENOUS BLD VENIPUNCTURE: CPT

## 2022-12-26 PROCEDURE — 80053 COMPREHEN METABOLIC PANEL: CPT

## 2022-12-26 PROCEDURE — 85025 COMPLETE CBC W/AUTO DIFF WBC: CPT

## 2022-12-29 LAB
METHYLMALONATE SERPL-SCNC: 377 NMOL/L (ref 0–378)
VIT B1 BLD-SCNC: 223.9 NMOL/L (ref 66.5–200)

## 2023-01-16 ENCOUNTER — OFFICE VISIT (OUTPATIENT)
Dept: BARIATRICS/WEIGHT MGMT | Facility: CLINIC | Age: 53
End: 2023-01-16
Payer: OTHER GOVERNMENT

## 2023-01-16 VITALS
OXYGEN SATURATION: 93 % | BODY MASS INDEX: 48.07 KG/M2 | HEART RATE: 84 BPM | WEIGHT: 254.6 LBS | HEIGHT: 61 IN | DIASTOLIC BLOOD PRESSURE: 72 MMHG | SYSTOLIC BLOOD PRESSURE: 121 MMHG

## 2023-01-16 PROCEDURE — 99024 POSTOP FOLLOW-UP VISIT: CPT | Performed by: SURGERY

## 2023-01-16 RX ORDER — PEN NEEDLE, DIABETIC 32GX 5/32"
NEEDLE, DISPOSABLE MISCELLANEOUS
COMMUNITY

## 2023-01-16 RX ORDER — DILTIAZEM HYDROCHLORIDE 120 MG/1
CAPSULE, COATED, EXTENDED RELEASE ORAL
COMMUNITY
Start: 2022-12-24 | End: 2023-01-16

## 2023-01-16 RX ORDER — TIZANIDINE 4 MG/1
TABLET ORAL
COMMUNITY
End: 2023-01-16

## 2023-01-16 RX ORDER — FLURBIPROFEN SODIUM 0.3 MG/ML
SOLUTION/ DROPS OPHTHALMIC
COMMUNITY
Start: 2022-12-27

## 2023-01-16 NOTE — PROGRESS NOTES
[facility] BAR SURG [facility] BARIATRIC SURGERY  [address] [place] [address]  [address] [place] [address]  Dept: [phone]  1/16/2023      Gissel Amaro.  [id number]  [id number]  1970  female    Date of last surgery: 10/19/2022Gastric Sleeve Laparoscopic With Davinci Robot      Chief Complaint: BH Post-Op Bariatric Surgery:   Gissel Amaro is status post procedure listed above  HPI:     Wt Readings from Last 10 Encounters:   01/16/23 115 kg (254 lb 9.6 oz)   12/06/22 116 kg (256 lb)   11/21/22 117 kg (257 lb 9.6 oz)   10/24/22 119 kg (263 lb 3.2 oz)   10/19/22 128 kg (281 lb 3.2 oz)   09/30/22 128 kg (281 lb 3.2 oz)   09/01/22 125 kg (276 lb 9.6 oz)   08/08/22 125 kg (275 lb 3.2 oz)   07/14/22 124 kg (272 lb 6.4 oz)   06/17/22 123 kg (270 lb 6.4 oz)        Today's weight is 115 kg (254 lb 9.6 oz) pounds,@,@ has a  loss of 2 pounds since the last visit and@ weight loss since surgery is 30 pounds. The patient reports a decreased portion size and loss of appetite.      Gissel Amaro denies reflux/heartburn     Diet and Exercise: Diet history reviewed and discussed with the patient. Weight loss/gains to date discussed with the patient.     She reports eating 3 meals per day, a typical portion size of 1 cup, eating 0 snacks per day, drinking 8 or more 8-oz. glasses of water per day, no carbonated beverage consumption and exercising regularly.       The patient states they are eating 60 grams of protein per day.           Review of Systems    Review of Systems   Constitutional: Negative.    HENT: Negative.    Eyes: Negative.    Respiratory: Negative.    Cardiovascular: Negative.    Gastrointestinal: Negative.    Endocrine: Negative.    Genitourinary: Negative.    Musculoskeletal: Negative.    Skin: Negative.    Allergic/Immunologic: Negative.    Neurological: Negative.    Hematological: Negative.    Psychiatric/Behavioral: Negative.    Patient Active  Problem List   Diagnosis   • Benign hypertension   • Small vessel coronary artery disease   • Diabetes mellitus (HCC)   • Edema   • Mixed hyperlipidemia   • CHF (congestive heart failure) (HCC)   • Morbid obesity (HCC)   • Obstructive sleep apnea syndrome   • BMI 45.0-49.9, adult (HCC)   • Pre-operative examination   • Obesity, Class III, BMI 40-49.9 (morbid obesity) (HCC)       Past Medical History:   Diagnosis Date   • Anxiety    • Asthma    • CAD (coronary artery disease)    • Depression    • Diabetes (HCC)    • MAX (dyspnea on exertion)    • Fibromyalgia    • GERD (gastroesophageal reflux disease)    • HTN (hypertension)    • Hyperlipemia    • IBS (irritable bowel syndrome)    • Plantar fasciitis    • Sleep apnea     cpap     Past Surgical History:   Procedure Laterality Date   • TONSILLECTOMY  1976   • LAPAROSCOPIC TUBAL LIGATION  2000   • GALLBLADDER SURGERY  2010   • HYSTERECTOMY  2013   • KNEE SURGERY Right 2017   • COLONOSCOPY  2017   • ENDOSCOPY  2021   • COLONOSCOPY W/ BIOPSIES AND POLYPECTOMY  06/15/2022    5 polyps removed   • GASTRIC SLEEVE LAPAROSCOPIC N/A 10/19/2022    Procedure: GASTRIC SLEEVE LAPAROSCOPIC WITH DAVINCI ROBOT;  Surgeon: Gloria Marinelli MD;  Location: [facility];  Service: Robotics - DaVinci;  Laterality: N/A;   • BREAST SURGERY Bilateral     2015, 2020   • CARDIAC CATHETERIZATION        The following portions of the patient's history were reviewed and updated as appropriate: allergies, current medications, past family history, past medical history, past social history, past surgical history and problem list.    Vitals:    01/16/23 0952   BP: 121/72   Pulse: 84   SpO2: 93%       Physical Exam  Awake and alert  Normal mental status  Normal pulmonary effort  Abdomen appropriate tenderness  Incisions no erythema  Extremities no tenderness or swelling      Assessment:   Patient Active Problem List   Diagnosis   • Benign hypertension   • Small vessel coronary artery disease   • Diabetes  mellitus (HCC)   • Edema   • Mixed hyperlipidemia   • CHF (congestive heart failure) (HCC)   • Morbid obesity (HCC)   • Obstructive sleep apnea syndrome   • BMI 45.0-49.9, adult (HCC)   • Pre-operative examination   • Obesity, Class III, BMI 40-49.9 (morbid obesity) (HCC)       Post-op, the patient is doing well.     Plan:     Encouraged patient to be sure to get plenty of lean protein per day through small frequent meals all with a protein source.   Activity restrictions: none.   Recommended patient be sure to get at least 70 grams of protein per day by eating small, frequent meals all with high lean protein choices. Be sure to limit/cut back on daily carbohydrate intake. Discussed with the patient the recommended amount of water per day to intake- half of body weight in ounces. Reviewed vitamin requirements. Be sure to do routine exercise, 150 minutes per week minimum, including both cardio and strength training.     Instructions / Recommendations: dietary counseling recommended, recommended a daily protein intake of  grams, vitamin supplement(s) recommended, recommended exercising at least 150 minutes per week, behavior modifications recommended and instructed to call the office for concerns, questions, or problems.     The patient was instructed to follow up in 3 months.     The patient was counseled regarding. Total time spent face to face was 15 minutes and 15 minutes was spent counseling.

## 2023-04-19 ENCOUNTER — OFFICE VISIT (OUTPATIENT)
Dept: BARIATRICS/WEIGHT MGMT | Facility: CLINIC | Age: 53
End: 2023-04-19
Payer: OTHER GOVERNMENT

## 2023-04-19 VITALS
WEIGHT: 254.2 LBS | DIASTOLIC BLOOD PRESSURE: 78 MMHG | HEIGHT: 61 IN | BODY MASS INDEX: 47.99 KG/M2 | SYSTOLIC BLOOD PRESSURE: 147 MMHG | HEART RATE: 78 BPM | OXYGEN SATURATION: 96 %

## 2023-04-19 DIAGNOSIS — E66.01 OBESITY, CLASS III, BMI 40-49.9 (MORBID OBESITY): Primary | ICD-10-CM

## 2023-04-19 RX ORDER — PROCHLORPERAZINE 25 MG/1
SUPPOSITORY RECTAL
COMMUNITY

## 2023-04-19 RX ORDER — INSULIN HUMAN 500 [IU]/ML
INJECTION, SOLUTION SUBCUTANEOUS
COMMUNITY
Start: 2023-04-03

## 2023-04-19 NOTE — PROGRESS NOTES
[facility] BAR SURG [facility] BARIATRIC SURGERY  [address] [place] [address]  [address] [place] [address]  Dept: [phone]  4/19/2023      Gissel Amaro.  [id number]  [id number]  1970  female    Date of last surgery: 10/19/2022Gastric Sleeve Laparoscopic With Davinci Robot      Chief Complaint: BH Post-Op Bariatric Surgery:   Gissel Amaro is status post procedure listed above  HPI:     Wt Readings from Last 10 Encounters:   04/19/23 115 kg (254 lb 3.2 oz)   01/16/23 115 kg (254 lb 9.6 oz)   12/06/22 116 kg (256 lb)   11/21/22 117 kg (257 lb 9.6 oz)   10/24/22 119 kg (263 lb 3.2 oz)   10/19/22 128 kg (281 lb 3.2 oz)   09/30/22 128 kg (281 lb 3.2 oz)   09/01/22 125 kg (276 lb 9.6 oz)   08/08/22 125 kg (275 lb 3.2 oz)   07/14/22 124 kg (272 lb 6.4 oz)        Today's weight is 115 kg (254 lb 3.2 oz) pounds,@ has a  loss of 0 pounds since the last visit and@ weight loss since surgery is 27 pounds. The patient reports a decreased portion size and loss of appetite.      Gissel Amaro denies nausea and vomiting, having some GERD and reflux occasionally      Diet and Exercise: Diet history reviewed and discussed with the patient. Weight loss/gains to date discussed with the patient.     She reports eating 3 meals per day, a typical portion size of 1/2 cup, eating 0-1 snacks per day, drinking 8 or more 8-oz. glasses of water per day, no carbonated beverage consumption and exercising regularly.       The patient states they are eating 40 grams of protein per day.     90am and 90 pm units insulin currently,  last hgb a1c 7     Breakfast: Egg or  [place] / ham  Lunch: Chicken and veggie    Dinner: [facility]  Snacks: none usually , jerky sticks   Drinks:  Water, cirkul water bottle   Exercise: walking a lot , bowflex , exercise bike     Unable to tolerate protein shakes after surgery      celebrate vitamins         Review of Systems    Constitutional: Positive for activity change, appetite change and fatigue.   Respiratory: Negative.    Cardiovascular: Negative.    Gastrointestinal:        GERD   Musculoskeletal: Positive for arthralgias, back pain and myalgias.         Patient Active Problem List   Diagnosis   • Benign hypertension   • Small vessel coronary artery disease   • Diabetes mellitus   • Edema   • Mixed hyperlipidemia   • CHF (congestive heart failure)   • Morbid obesity (HCC)   • Obstructive sleep apnea syndrome   • BMI 45.0-49.9, adult   • Pre-operative examination   • Obesity, Class III, BMI 40-49.9 (morbid obesity)       Past Medical History:   Diagnosis Date   • Anxiety    • Asthma    • CAD (coronary artery disease)    • Depression    • Diabetes    • MAX (dyspnea on exertion)    • Fibromyalgia    • GERD (gastroesophageal reflux disease)    • HTN (hypertension)    • Hyperlipemia    • IBS (irritable bowel syndrome)    • Plantar fasciitis    • Sleep apnea     cpap     Past Surgical History:   Procedure Laterality Date   • TONSILLECTOMY  1976   • LAPAROSCOPIC TUBAL LIGATION  2000   • GALLBLADDER SURGERY  2010   • HYSTERECTOMY  2013   • KNEE SURGERY Right 2017   • COLONOSCOPY  2017   • ENDOSCOPY  2021   • COLONOSCOPY W/ BIOPSIES AND POLYPECTOMY  06/15/2022    5 polyps removed   • GASTRIC SLEEVE LAPAROSCOPIC N/A 10/19/2022    Procedure: GASTRIC SLEEVE LAPAROSCOPIC WITH DAVINCI ROBOT;  Surgeon: Gloria Marinelli MD;  Location: [facility];  Service: Robotics - DaVinci;  Laterality: N/A;   • BREAST SURGERY Bilateral     2015, 2020   • CARDIAC CATHETERIZATION        The following portions of the patient's history were reviewed and updated as appropriate: allergies, current medications, past medical history, past social history, past surgical history and problem list.    Vitals:    04/19/23 1336   BP: 147/78   Pulse: 78   SpO2: 96%       Physical Exam  Constitutional:       Appearance: Normal appearance. She is obese.   Pulmonary:       Effort: Pulmonary effort is normal.   Abdominal:      General: Abdomen is flat.      Palpations: Abdomen is soft.   Skin:     General: Skin is warm and dry.   Neurological:      General: No focal deficit present.      Mental Status: She is alert and oriented to person, place, and time.   Psychiatric:         Mood and Affect: Mood normal.         Behavior: Behavior normal.         Thought Content: Thought content normal.         Judgment: Judgment normal.             Assessment:   BMI 48.03, class 3 obesity, 7 m gastric sleeve , stall with weight loss     Post-op, the patient has hit a plateau with her weight loss. Pt reports being stalled with her weight loss for a few months now. Pt unfortunately states she cannot tolerate protein shakes anymore and has been struggling to get her protein in. I did encourage the pt to follow up with the dietician to discuss protein options further. Also encourage 20-30 minutes of exercise 2-3 days a week. Pt did state she has started to see a lot of nonsale victories including decreasing her insulin and going back to work and walking more there. Plan for pt to follow up with bariatric provider in 6 months and with dietician in 3 months.         Plan:     Encouraged patient to be sure to get plenty of lean protein per day through small frequent meals all with a protein source.   Activity restrictions: none.   Recommended patient be sure to get at least 70 grams of protein per day by eating small, frequent meals all with high lean protein choices. Be sure to limit/cut back on daily carbohydrate intake. Discussed with the patient the recommended amount of water per day to intake- half of body weight in ounces. Reviewed vitamin requirements. Be sure to do routine exercise, 150 minutes per week minimum, including both cardio and strength training.     Instructions / Recommendations: dietary counseling recommended, recommended a daily protein intake of  grams, vitamin supplement(s)  recommended, recommended exercising at least 150 minutes per week, behavior modifications recommended and instructed to call the office for concerns, questions, or problems.     The patient was instructed to follow up in 3 months with dietician .     The patient was counseled regarding diet, exercise, protein intake. Total time spent face to face was 30 minutes and 15 minutes was spent counseling.     Carol Porter APRBONITA  [facility]

## 2023-04-24 ENCOUNTER — OFFICE (AMBULATORY)
Dept: URBAN - METROPOLITAN AREA CLINIC 64 | Facility: CLINIC | Age: 53
End: 2023-04-24
Payer: COMMERCIAL

## 2023-04-24 VITALS
SYSTOLIC BLOOD PRESSURE: 123 MMHG | HEIGHT: 62 IN | WEIGHT: 255 LBS | HEART RATE: 79 BPM | DIASTOLIC BLOOD PRESSURE: 82 MMHG

## 2023-04-24 DIAGNOSIS — K21.9 GASTRO-ESOPHAGEAL REFLUX DISEASE WITHOUT ESOPHAGITIS: ICD-10-CM

## 2023-04-24 DIAGNOSIS — R13.10 DYSPHAGIA, UNSPECIFIED: ICD-10-CM

## 2023-04-24 PROCEDURE — 99212 OFFICE O/P EST SF 10 MIN: CPT | Performed by: NURSE PRACTITIONER

## 2023-04-24 RX ORDER — AMITRIPTYLINE HYDROCHLORIDE 50 MG/1
TABLET, FILM COATED ORAL
Qty: 90 | Refills: 3 | Status: ACTIVE

## 2023-04-24 RX ORDER — ONDANSETRON 8 MG/1
TABLET, ORALLY DISINTEGRATING ORAL
Qty: 30 TABLET | Refills: 0 | Status: SHIPPED | OUTPATIENT
Start: 2023-04-24

## 2023-10-20 ENCOUNTER — OFFICE VISIT (OUTPATIENT)
Dept: BARIATRICS/WEIGHT MGMT | Facility: CLINIC | Age: 53
End: 2023-10-20
Payer: OTHER GOVERNMENT

## 2023-10-20 VITALS
DIASTOLIC BLOOD PRESSURE: 88 MMHG | HEART RATE: 79 BPM | BODY MASS INDEX: 48.41 KG/M2 | HEIGHT: 61 IN | OXYGEN SATURATION: 96 % | WEIGHT: 256.4 LBS | SYSTOLIC BLOOD PRESSURE: 160 MMHG

## 2023-10-20 DIAGNOSIS — Z90.3 H/O GASTRIC SLEEVE: ICD-10-CM

## 2023-10-20 DIAGNOSIS — E66.01 OBESITY, CLASS III, BMI 40-49.9 (MORBID OBESITY): Primary | ICD-10-CM

## 2023-10-20 PROCEDURE — 99214 OFFICE O/P EST MOD 30 MIN: CPT | Performed by: NURSE PRACTITIONER

## 2023-10-20 RX ORDER — TIRZEPATIDE 2.5 MG/.5ML
2.5 INJECTION, SOLUTION SUBCUTANEOUS WEEKLY
COMMUNITY

## 2023-10-20 NOTE — PROGRESS NOTES
[facility] BAR SURG [facility] BARIATRIC SURGERY  [address] [place] [address]  [address] [place] [address]  Dept: [phone]  10/20/2023      Gissel Amaro.  [id number]  [id number]  1970  female    Date of last surgery: 10/19/2022Gastric Sleeve Laparoscopic With Davinci Robot      Chief Complaint: BH Post-Op Bariatric Surgery:   Gissel Amaro is status post procedure listed above  HPI:     Wt Readings from Last 10 Encounters:   10/20/23 116 kg (256 lb 6.4 oz)   06/22/23 115 kg (253 lb)   04/19/23 115 kg (254 lb 3.2 oz)   01/16/23 115 kg (254 lb 9.6 oz)   12/06/22 116 kg (256 lb)   11/21/22 117 kg (257 lb 9.6 oz)   10/24/22 119 kg (263 lb 3.2 oz)   10/19/22 128 kg (281 lb 3.2 oz)   09/30/22 128 kg (281 lb 3.2 oz)   09/01/22 125 kg (276 lb 9.6 oz)        Today's weight is 116 kg (256 lb 6.4 oz) pounds,BMI 48.45 has a  gain of 2 pounds since the last visit andHIS@ weight loss since surgery is 25 pounds. The patient reports a decreased portion size and loss of appetite.      Gissel Amaro reportsreflux/heartburn and dysphagia, Seeing Gi Tuesday for EGD and dilation      Diet and Exercise: Diet history reviewed and discussed with the patient. Weight loss/gains to date discussed with the patient.     She reports eating 3 meals per day, a typical portion size of 1/2-1 cup, eating 1 snacks per day, drinking 8 or more 8-oz. glasses of water per day, no carbonated beverage consumption and exercising regularly.       The patient states they are eating 40-50 grams of protein per day.     Breakfast: eggs and duncan  Lunch: chicken salad  Dinner: chicken, baked potato, salad   Snacks: beef sticks   Drinks: water , protein shake   No able to tolerate protein shakes, able to do protein chips or bars     Exercise: broken tail bone , walking some    Last hgb A1c 6.5    Bariatric multi - celebrate       Review of Systems   Constitutional:  Positive for activity change  and appetite change.   Respiratory: Negative.     Cardiovascular: Negative.    Gastrointestinal: Negative.    Musculoskeletal:  Positive for back pain.        Broken tail bone          Patient Active Problem List   Diagnosis    Benign hypertension    Small vessel coronary artery disease    Diabetes mellitus    Edema    Mixed hyperlipidemia    CHF (congestive heart failure)    Morbid obesity    Obstructive sleep apnea syndrome    BMI 45.0-49.9, adult    Pre-operative examination    Obesity, Class III, BMI 40-49.9 (morbid obesity)       Past Medical History:   Diagnosis Date    Anxiety     Asthma     CAD (coronary artery disease)     Depression     Diabetes     MAX (dyspnea on exertion)     Fibromyalgia     GERD (gastroesophageal reflux disease)     HTN (hypertension)     Hyperlipemia     IBS (irritable bowel syndrome)     Plantar fasciitis     Sleep apnea     cpap     Past Surgical History:   Procedure Laterality Date    TONSILLECTOMY  1976    LAPAROSCOPIC TUBAL LIGATION  2000    GALLBLADDER SURGERY  2010    HYSTERECTOMY  2013    KNEE SURGERY Right 2017    COLONOSCOPY  2017    ENDOSCOPY  2021    COLONOSCOPY W/ BIOPSIES AND POLYPECTOMY  06/15/2022    5 polyps removed    GASTRIC SLEEVE LAPAROSCOPIC N/A 10/19/2022    Procedure: GASTRIC SLEEVE LAPAROSCOPIC WITH DAVINCI ROBOT;  Surgeon: Gloria Marinelli MD;  Location: [facility];  Service: Robotics - DaVinci;  Laterality: N/A;    BREAST SURGERY Bilateral     2015, 2020    CARDIAC CATHETERIZATION        The following portions of the patient's history were reviewed and updated as appropriate: allergies, current medications, past medical history, past social history, past surgical history, and problem list.    Height 61 inches, weight 256 pounds, BMI 48.45  Physical Exam  Constitutional:       Appearance: Normal appearance. She is obese.   Pulmonary:      Effort: Pulmonary effort is normal.   Abdominal:      General: Abdomen is flat.      Palpations: Abdomen is soft.    Skin:     General: Skin is warm and dry.   Neurological:      General: No focal deficit present.      Mental Status: She is alert and oriented to person, place, and time.   Psychiatric:         Mood and Affect: Mood normal.         Behavior: Behavior normal.         Thought Content: Thought content normal.         Judgment: Judgment normal.             Assessment:   BMI 48.45, class 3 obesity, 1 yr gastric sleeve     Post-op, the patient is doing well but states she is starting to hit a stall with her weight loss. Pt is getting in around 40-50 grams of protein in her diet a day. Encourage 60+ grams of protein a day and 20-30 minutes of exercise 2-3 days a week including strength training once cleared by ortho. Pt was recently started on mounjaro by her PCP to help with improvements in DMII and weight loss.     Pt is also having some dysphagia but is scheduled for an EGD with dilation with [facility] on 10/24/23.PT reports having dysphagia before her gastric sleeve surgery.    Plan to follow up with pt in 1 yr. Will check yearly labs today.    Plan:     Encouraged patient to be sure to get plenty of lean protein per day through small frequent meals all with a protein source.   Activity restrictions: none.   Recommended patient be sure to get at least 70 grams of protein per day by eating small, frequent meals all with high lean protein choices. Be sure to limit/cut back on daily carbohydrate intake. Discussed with the patient the recommended amount of water per day to intake- half of body weight in ounces. Reviewed vitamin requirements. Be sure to do routine exercise, 150 minutes per week minimum, including both cardio and strength training.     Instructions / Recommendations: dietary counseling recommended, recommended a daily protein intake of  grams, vitamin supplement(s) recommended, recommended exercising at least 150 minutes per week, behavior modifications recommended and instructed to call the office for  concerns, questions, or problems.     The patient was instructed to follow up in 12 months.     The patient was counseled regarding diet and exercise. Total time spent face to face was 30 minutes and 15 minutes was spent counseling.     Carol Porter APRBONITA  [facility]

## 2023-10-24 ENCOUNTER — OFFICE (AMBULATORY)
Dept: URBAN - METROPOLITAN AREA CLINIC 64 | Facility: CLINIC | Age: 53
End: 2023-10-24

## 2023-10-24 VITALS
SYSTOLIC BLOOD PRESSURE: 145 MMHG | HEIGHT: 62 IN | HEART RATE: 76 BPM | WEIGHT: 258 LBS | DIASTOLIC BLOOD PRESSURE: 86 MMHG

## 2023-10-24 DIAGNOSIS — R13.10 DYSPHAGIA, UNSPECIFIED: ICD-10-CM

## 2023-10-24 DIAGNOSIS — K21.9 GASTRO-ESOPHAGEAL REFLUX DISEASE WITHOUT ESOPHAGITIS: ICD-10-CM

## 2023-10-24 DIAGNOSIS — R11.0 NAUSEA: ICD-10-CM

## 2023-10-24 DIAGNOSIS — E11.9 TYPE 2 DIABETES MELLITUS WITHOUT COMPLICATIONS: ICD-10-CM

## 2023-10-24 PROCEDURE — 99214 OFFICE O/P EST MOD 30 MIN: CPT | Performed by: NURSE PRACTITIONER

## 2023-10-24 RX ORDER — PANTOPRAZOLE SODIUM 40 MG/1
80 TABLET, DELAYED RELEASE ORAL
Qty: 180 | Refills: 3 | Status: COMPLETED
Start: 2023-10-24 | End: 2024-07-16

## 2023-10-24 RX ORDER — AMITRIPTYLINE HYDROCHLORIDE 50 MG/1
TABLET, FILM COATED ORAL
Qty: 90 | Refills: 3 | Status: ACTIVE

## 2023-10-24 RX ORDER — DICYCLOMINE HYDROCHLORIDE 20 MG/1
TABLET ORAL
Qty: 360 | Refills: 3 | Status: ACTIVE
Start: 2023-01-16

## 2023-12-21 ENCOUNTER — ON CAMPUS - OUTPATIENT (AMBULATORY)
Dept: URBAN - METROPOLITAN AREA HOSPITAL 77 | Facility: HOSPITAL | Age: 53
End: 2023-12-21

## 2023-12-21 DIAGNOSIS — R13.10 DYSPHAGIA, UNSPECIFIED: ICD-10-CM

## 2023-12-21 DIAGNOSIS — K31.7 POLYP OF STOMACH AND DUODENUM: ICD-10-CM

## 2023-12-21 DIAGNOSIS — Z98.84 BARIATRIC SURGERY STATUS: ICD-10-CM

## 2023-12-21 DIAGNOSIS — K29.50 UNSPECIFIED CHRONIC GASTRITIS WITHOUT BLEEDING: ICD-10-CM

## 2023-12-21 PROCEDURE — 43450 DILATE ESOPHAGUS 1/MULT PASS: CPT | Performed by: INTERNAL MEDICINE

## 2023-12-21 PROCEDURE — 43239 EGD BIOPSY SINGLE/MULTIPLE: CPT | Performed by: INTERNAL MEDICINE

## 2024-01-05 ENCOUNTER — OFFICE VISIT (OUTPATIENT)
Dept: CARDIOLOGY | Facility: CLINIC | Age: 54
End: 2024-01-05
Payer: OTHER GOVERNMENT

## 2024-01-05 VITALS
HEART RATE: 102 BPM | DIASTOLIC BLOOD PRESSURE: 80 MMHG | HEIGHT: 61 IN | OXYGEN SATURATION: 98 % | BODY MASS INDEX: 46.82 KG/M2 | WEIGHT: 248 LBS | SYSTOLIC BLOOD PRESSURE: 134 MMHG

## 2024-01-05 DIAGNOSIS — I10 BENIGN HYPERTENSION: ICD-10-CM

## 2024-01-05 DIAGNOSIS — I25.10 SMALL VESSEL CORONARY ARTERY DISEASE: Primary | ICD-10-CM

## 2024-01-05 DIAGNOSIS — E78.2 MIXED HYPERLIPIDEMIA: ICD-10-CM

## 2024-01-05 PROCEDURE — 93000 ELECTROCARDIOGRAM COMPLETE: CPT | Performed by: NURSE PRACTITIONER

## 2024-01-05 PROCEDURE — 99213 OFFICE O/P EST LOW 20 MIN: CPT | Performed by: NURSE PRACTITIONER

## 2024-01-05 NOTE — PROGRESS NOTES
[facility] CARDIOLOGY      REASON FOR FOLLOW-UP:  Coronary artery disease  Hypertension  Hypertensive cardiovascular disease          Chief Complaint   Patient presents with    Heart Problem         Dear Marvin, Sony Grimaldo MD        History of Present Illness   It was my pleasure to see Gissel in the office today.  She is a 53-year-old female with known history of coronary artery disease per heart catheterization performed 12/12/2018 in which she was found to have small vessel disease in the distal LAD with elevated LVEDP.  Left ventricular ejection fraction low normal at 50%.  She has additional history that includes diabetes mellitus 2, hypertension with hypertensive cardiovascular disease, dyslipidemia, history of lower extremity DVT on anticoagulation prior, history of heart failure with preserved ejection fraction.  2D echo performed 9/9/2020 with normal LV systolic function with EF 60-65%.  Trace TR/AR. Normal pulmonary artery pressure.  She presents today in follow-up for the above-mentioned diagnoses.    Today, Lydia reports that she feels well from a cardiovascular standpoint.  Specifically, she denies any chest pain, pressure, tightness or palpitations.  She denies any shortness of breath at rest, dyspnea with exertion, orthopnea or PND.  She has had no dizziness or lightheadedness, no near syncopal or syncopal episodes.  She offers no complaints today.    The patient stated that her insurance is covering Repatha and she is tolerating well.  Last lipid report from 6/22/2023 showed very elevated triglycerides at 510, .  Creatinine 1.69, CBC and thyroid panel within normal limits.      ASSESSMENT:  Coronary artery disease- small vessel  History of heart failure with preserved ejection fraction  Hypertension with hypertensive cardiovascular disease  Dyslipidemia  Chronic kidney disease  History of lower extremity DVT  Morbid obesity with BMI 51.76 status post gastric  sleeve surgery 10/19/2022  Trivial valvular heart disease    PLAN:  Continue current CV plan of care to include Repatha, fenofibrate, losartan, Inderal  Risk factor modification including heart healthy diet, routine exercise as tolerated, healthy weight  Patient is due for repeat labs in February per primary care.  We would appreciate a copy of those labs.  Thank you  Follow-up with consultants as scheduled-she follows nephrology for CKD  Follow-up our office in 6 months or sooner if needed      Diagnoses and all orders for this visit:    1. Small vessel coronary artery disease (Primary)  -     ECG 12 Lead    2. Mixed hyperlipidemia  -     ECG 12 Lead    3. BMI 45.0-49.9, adult  -     ECG 12 Lead    4. Benign hypertension  -     ECG 12 Lead          The following portions of the patient's history were reviewed and updated as appropriate: allergies, current medications, past family history, past medical history, past social history, past surgical history, and problem list.    REVIEW OF SYSTEMS:    Review of Systems   All other systems reviewed and are negative.      Vitals:    01/05/24 0828   BP: 134/80   Pulse: 102   SpO2: 98%         PHYSICAL EXAM:    General: Alert, cooperative, no distress, appears stated age  Head:  Normocephalic, atraumatic, mucous membranes moist  Eyes:  Conjunctiva/corneas clear, EOM's intact     Neck:  Supple,  no JVD or bruit     Lungs: Clear to auscultation bilaterally, no wheezes rhonchi rales are noted  Chest wall: No tenderness  Musculoskeletal:   Ambulates freely without assistance  Heart::  Regular rate and rhythm, S1 and S2 normal, no murmur, rub or gallop  Abdomen: Soft, non-tender, nondistended, bowel sounds active, no abdominal bruit  Extremities: No cyanosis, clubbing, or edema   Pulses: 2+ and symmetric all extremities  Skin:  No rashes or lesions  Neuro/psych: A&O x3. CN II through XII are grossly intact with appropriate affect        Past Medical History:   Diagnosis Date     Anxiety     Asthma     CAD (coronary artery disease)     Depression     Diabetes     MAX (dyspnea on exertion)     Fibromyalgia     GERD (gastroesophageal reflux disease)     HTN (hypertension)     Hyperlipemia     IBS (irritable bowel syndrome)     Plantar fasciitis     Sleep apnea     cpap       Past Surgical History:   Procedure Laterality Date    BREAST SURGERY Bilateral     2015, 2020    CARDIAC CATHETERIZATION      COLONOSCOPY  2017    COLONOSCOPY W/ BIOPSIES AND POLYPECTOMY  06/15/2022    5 polyps removed    ENDOSCOPY  2021    GALLBLADDER SURGERY  2010    GASTRIC SLEEVE LAPAROSCOPIC N/A 10/19/2022    Procedure: GASTRIC SLEEVE LAPAROSCOPIC WITH [employer] ROBOT;  Surgeon: Gloria Marinelli MD;  Location: [facility] MAIN OR;  Service: Robotics - [employer];  Laterality: N/A;    HYSTERECTOMY  2013    KNEE SURGERY Right 2017    LAPAROSCOPIC TUBAL LIGATION  2000    TONSILLECTOMY  1976         Current Outpatient Medications:     albuterol sulfate  (90 Base) MCG/ACT inhaler, Inhale 2 puffs Every 4 (Four) Hours As Needed., Disp: , Rfl:     amitriptyline (ELAVIL) 25 MG tablet, Take 1 tablet by mouth Every Night., Disp: , Rfl:     B-D UF III MINI PEN NEEDLES 31G X 5 MM misc, USE AS DIRECTED TWICE DAILY FOR INSULIN INJECTIONS, Disp: , Rfl:     busPIRone (BUSPAR) 15 MG tablet, Take 1 tablet by mouth As Needed (anxiety)., Disp: , Rfl:     Cariprazine HCl (VRAYLAR) 1.5 MG capsule capsule, Take 1 capsule by mouth Every Night., Disp: , Rfl:     Cholecalciferol 125 MCG (5000 UT) tablet, cholecalciferol (vitamin D3) 125 mcg (5,000 unit) tablet  TAKE 1 TABLET BY MOUTH EVERY DAY, Disp: , Rfl:     Continuous Blood Gluc Sensor (Dexcom G6 Sensor), Every 10 (Ten) Days., Disp: , Rfl:     Continuous Blood Gluc Sensor (FreeStyle Sushma 14 Day Sensor) misc, replace 1 sensor every 14 days, Disp: , Rfl:     Dapagliflozin Propanediol (Farxiga) 10 MG tablet, Take 10 mg by mouth Every Night., Disp: , Rfl:     doxepin (SINEquan) 25 MG capsule, Take 1  capsule by mouth Every Night., Disp: , Rfl:     Evolocumab (REPATHA) solution auto-injector SureClick injection, Inject 1 mL under the skin into the appropriate area as directed Every 14 (Fourteen) Days., Disp: 1 mL, Rfl: 3    fenofibrate (TRICOR) 145 MG tablet, Take 1 tablet by mouth Daily., Disp: , Rfl:     Fluticasone-Umeclidin-Vilant (Trelegy Ellipta) 100-62.5-25 MCG/INH inhaler, Trelegy Ellipta 100 mcg-62.5 mcg-25 mcg powder for inhalation  INHALE 1 PUFF BY MOUTH EVERY DAY, Disp: , Rfl:     gabapentin (NEURONTIN) 600 MG tablet, Take 1 tablet by mouth 3 (Three) Times a Day As Needed., Disp: , Rfl:     glucose blood test strip, Accu-Chek Guide test strips, Disp: , Rfl:     HYDROcodone-acetaminophen (NORCO)  MG per tablet, Take 1 tablet by mouth Every 8 (Eight) Hours As Needed (chronic back pain)., Disp: , Rfl:     Insulin Pen Needle (GNP UltiCare Pen Needles) 32G X 4 MM misc, 32g 5/32, Disp: , Rfl:     Insulin Regular Human (HUMULIN R U-500 [facility]), 150 Units. 75 am and  75 pm, Disp: , Rfl:     losartan (COZAAR) 25 MG tablet, Take 4 tablets by mouth Daily., Disp: , Rfl:     ondansetron ODT (ZOFRAN-ODT) 8 MG disintegrating tablet, DISSOLVE 1 TABLET IN MOUTH EVERY EIGHT HOURS AS NEEDED FOR NAUSEA AND VOMITING, Disp: 30 tablet, Rfl: 0    propranolol (INDERAL) 80 MG tablet, Take 1 tablet by mouth Daily., Disp: , Rfl:     rOPINIRole (REQUIP) 2 MG tablet, Take 1 tablet by mouth Every Night., Disp: , Rfl:     sertraline (ZOLOFT) 100 MG tablet, Take 1 tablet by mouth 2 (Two) Times a Day., Disp: , Rfl:     SUMAtriptan (IMITREX) 100 MG tablet, Take 1 tablet by mouth Every 2 (Two) Hours As Needed for Migraine. Take one tablet at onset of headache. May repeat dose one time in 2 hours if headache not relieved., Disp: , Rfl:     Tirzepatide (Mounjaro) 2.5 MG/0.5ML solution pen-injector, Inject 0.5 mL under the skin into the appropriate area as directed 1 (One) Time Per Week., Disp: , Rfl:     TiZANidine (ZANAFLEX) 4  "MG capsule, Take 1 capsule by mouth 3 (Three) Times a Day As Needed for Muscle Spasms., Disp: , Rfl:     vitamin D (ERGOCALCIFEROL) 1.25 MG (39379 UT) capsule capsule, Take 1 capsule by mouth 1 (One) Time Per Week. Wednesdays, Disp: , Rfl:     Allergies   Allergen Reactions    Trulicity [Dulaglutide] Other (See Comments)     Caused pancreatitis    Penicillins Rash and Unknown - High Severity     Childhood Rx       Family History   Problem Relation Age of Onset    Cancer Mother 60        Breast    COPD Father 64    Heart disease Father     Obesity Maternal Grandmother        Social History     Tobacco Use    Smoking status: Never    Smokeless tobacco: Never   Substance Use Topics    Alcohol use: Never           Current Electrocardiogram:    ECG 12 Lead    Date/Time: 1/5/2024 3:05 PM  Performed by: Fidelina Cronin APRN    Authorized by: Fidelina Cronin APRN  Comparison: compared with previous ECG from 6/22/2023  Rhythm: sinus rhythm and sinus tachycardia  BPM: 102              EMR Dragon/Transcription:   \"Dictated utilizing Dragon dictation\".     Copied text in this note has been reviewed by me and is accurate as of 01/08/24.    "

## 2024-04-10 ENCOUNTER — TELEPHONE (OUTPATIENT)
Dept: CARDIOLOGY | Facility: CLINIC | Age: 54
End: 2024-04-10

## 2024-04-22 ENCOUNTER — OFFICE VISIT (OUTPATIENT)
Dept: CARDIOLOGY | Facility: CLINIC | Age: 54
End: 2024-04-22
Payer: OTHER GOVERNMENT

## 2024-04-22 VITALS
DIASTOLIC BLOOD PRESSURE: 81 MMHG | SYSTOLIC BLOOD PRESSURE: 122 MMHG | BODY MASS INDEX: 45.69 KG/M2 | WEIGHT: 242 LBS | OXYGEN SATURATION: 97 % | HEIGHT: 61 IN | HEART RATE: 71 BPM

## 2024-04-22 DIAGNOSIS — E78.2 MIXED HYPERLIPIDEMIA: ICD-10-CM

## 2024-04-22 DIAGNOSIS — I10 BENIGN HYPERTENSION: ICD-10-CM

## 2024-04-22 DIAGNOSIS — R07.89 CHEST PAIN, ATYPICAL: Primary | ICD-10-CM

## 2024-04-22 PROCEDURE — 99213 OFFICE O/P EST LOW 20 MIN: CPT | Performed by: NURSE PRACTITIONER

## 2024-04-22 NOTE — PROGRESS NOTES
[facility] CARDIOLOGY      REASON FOR FOLLOW-UP:  Follow-up hospitalization  Coronary artery disease  Hypertension  Hypertensive cardiovascular disease          Chief Complaint   Patient presents with    Heart Problem         Dear Sony Wong MD        Heart Problem  Associated symptoms include chest pain and nausea. Pertinent negatives include no vomiting.      It was my pleasure to see Gissel in the office today.  She is a 53 year-old female with known history of coronary artery disease per heart catheterization performed 12/12/2018 in which she was found to have small vessel disease in the distal LAD with elevated LVEDP.  Left ventricular ejection fraction low normal at 50%.  She has additional history that includes diabetes mellitus 2, hypertension with hypertensive cardiovascular disease, dyslipidemia, history of lower extremity DVT on anticoagulation prior, history of heart failure with preserved ejection fraction.  2D echo performed 9/9/2020 with normal LV systolic function with EF 60-65%.  Trace TR/AR. Normal pulmonary artery pressure.  She presents today in follow-up from hospitalization.    Gissel was hospitalized at [facility] 4/2/2024 for symptoms of chest pain.  Records were reviewed-she ruled out for acute coronary syndrome and underwent nuclear stress testing that showed no evidence of ischemia.  2D echocardiogram was also performed with normal LV function and wall motion with no valvular pathology reported.  Due to her ongoing symptoms and concern for underlying disease as well as false negative stress testing, left heart catheterization was performed that showed normal coronaries.  She presents today in follow-up from that hospitalization.  Today, Gissel continues to report intermittent symptoms of midsternal chest pain with radiation through to her back.  The pain was initially 10/10 in intensity prior to her hospitalization, however it has improved  slightly.  She does have underlying sleep apnea and sleeps with her head elevated.  She does not indulge in spicy foods due to previous gastric sleeve surgery.  She reports some associated nausea and shortness of breath with the chest pain.  She has additional history of esophageal stricture status post prior dilation in the past.  Her last endoscopy was December 2023.      ASSESSMENT:  Chest pain-atypical  Coronary artery disease- small vessel.  No evidence of coronary disease on heart cath April 2024  History of heart failure with preserved ejection fraction  Hypertension with hypertensive cardiovascular disease  Dyslipidemia  Chronic kidney disease  History of lower extremity DVT  Morbid obesity with BMI 51.76 status post gastric sleeve surgery 10/19/2022  Trivial valvular heart disease    PLAN:  Recommend patient follow-up with GI for any additional diagnostics.  We discussed possibility of Prinzmetal's angina.  If no other source of her chest pain is identified, can start trial of low-dose nitrate.  Otherwise, she should continue risk factor modification including heart healthy diet, routine exercise as tolerated, healthy weight.  Continue fenofibrate, ARB, BB.  Follow-up in 6 months or sooner if needed      Diagnoses and all orders for this visit:    1. Chest pain, atypical (Primary)    2. Mixed hyperlipidemia    3. Benign hypertension          The following portions of the patient's history were reviewed and updated as appropriate: allergies, current medications, past family history, past medical history, past social history, past surgical history, and problem list.    REVIEW OF SYSTEMS:    Review of Systems   Cardiovascular:  Positive for chest pain.   Respiratory:  Positive for shortness of breath.    Gastrointestinal:  Positive for heartburn and nausea. Negative for vomiting.   All other systems reviewed and are negative.      Vitals:    04/22/24 0935   BP: 122/81   Pulse: 71   SpO2: 97%         PHYSICAL  EXAM:    General: Alert, cooperative, no distress, appears stated age  Head:  Normocephalic, atraumatic, mucous membranes moist  Eyes:  Conjunctiva/corneas clear, EOM's intact     Neck:  Supple,  no JVD or bruit     Lungs: Clear to auscultation bilaterally, no wheezes rhonchi rales are noted  Chest wall: No tenderness  Musculoskeletal:   Ambulates freely without assistance  Heart::  Regular rate and rhythm, S1 and S2 normal, no murmur, rub or gallop  Abdomen: Soft, non-tender, nondistended, bowel sounds active, no abdominal bruit  Extremities: No cyanosis, clubbing, or edema   Pulses: 2+ and symmetric all extremities  Skin:  No rashes or lesions  Neuro/psych: A&O x3. CN II through XII are grossly intact with appropriate affect        Past Medical History:   Diagnosis Date    Anxiety     Asthma     CAD (coronary artery disease)     Depression     Diabetes     MAX (dyspnea on exertion)     Fibromyalgia     GERD (gastroesophageal reflux disease)     HTN (hypertension)     Hyperlipemia     IBS (irritable bowel syndrome)     Plantar fasciitis     Sleep apnea     cpap       Past Surgical History:   Procedure Laterality Date    BREAST SURGERY Bilateral     2015, 2020    CARDIAC CATHETERIZATION      COLONOSCOPY  2017    COLONOSCOPY W/ BIOPSIES AND POLYPECTOMY  06/15/2022    5 polyps removed    ENDOSCOPY  2021    GALLBLADDER SURGERY  2010    GASTRIC SLEEVE LAPAROSCOPIC N/A 10/19/2022    Procedure: GASTRIC SLEEVE LAPAROSCOPIC WITH [employer] ROBOT;  Surgeon: Gloria Marinelli MD;  Location: [facility];  Service: Robotics - DaVinci;  Laterality: N/A;    HYSTERECTOMY  2013    KNEE SURGERY Right 2017    LAPAROSCOPIC TUBAL LIGATION  2000    TONSILLECTOMY  1976         Current Outpatient Medications:     albuterol sulfate  (90 Base) MCG/ACT inhaler, Inhale 2 puffs Every 4 (Four) Hours As Needed., Disp: , Rfl:     amitriptyline (ELAVIL) 25 MG tablet, Take 1 tablet by mouth Every Night., Disp: , Rfl:     B-D UF III MINI PEN  NEEDLES 31G X 5 MM misc, USE AS DIRECTED TWICE DAILY FOR INSULIN INJECTIONS, Disp: , Rfl:     busPIRone (BUSPAR) 15 MG tablet, Take 1 tablet by mouth As Needed (anxiety)., Disp: , Rfl:     Cariprazine HCl (VRAYLAR) 1.5 MG capsule capsule, Take 1 capsule by mouth Every Night., Disp: , Rfl:     Cholecalciferol 125 MCG (5000 UT) tablet, cholecalciferol (vitamin D3) 125 mcg (5,000 unit) tablet  TAKE 1 TABLET BY MOUTH EVERY DAY, Disp: , Rfl:     Continuous Blood Gluc Sensor (Dexcom G6 Sensor), Every 10 (Ten) Days., Disp: , Rfl:     Continuous Blood Gluc Sensor (FreeStyle Sushma 14 Day Sensor) misc, replace 1 sensor every 14 days, Disp: , Rfl:     Dapagliflozin Propanediol (Farxiga) 10 MG tablet, Take 10 mg by mouth Every Night., Disp: , Rfl:     doxepin (SINEquan) 25 MG capsule, Take 1 capsule by mouth Every Night., Disp: , Rfl:     Evolocumab (REPATHA) solution auto-injector SureClick injection, Inject 1 mL under the skin into the appropriate area as directed Every 14 (Fourteen) Days., Disp: 1 mL, Rfl: 3    fenofibrate (TRICOR) 145 MG tablet, Take 1 tablet by mouth Daily., Disp: , Rfl:     Fluticasone-Umeclidin-Vilant (Trelegy Ellipta) 100-62.5-25 MCG/INH inhaler, Trelegy Ellipta 100 mcg-62.5 mcg-25 mcg powder for inhalation  INHALE 1 PUFF BY MOUTH EVERY DAY, Disp: , Rfl:     gabapentin (NEURONTIN) 600 MG tablet, Take 1 tablet by mouth 3 (Three) Times a Day As Needed., Disp: , Rfl:     glucose blood test strip, Accu-Chek Guide test strips, Disp: , Rfl:     HYDROcodone-acetaminophen (NORCO)  MG per tablet, Take 1 tablet by mouth Every 8 (Eight) Hours As Needed (chronic back pain)., Disp: , Rfl:     Insulin Pen Needle (GNP UltiCare Pen Needles) 32G X 4 MM misc, 32g 5/32, Disp: , Rfl:     Insulin Regular Human (HUMULIN R U-500 [employer]), 150 Units. 75 am and  75 pm, Disp: , Rfl:     losartan (COZAAR) 25 MG tablet, Take 4 tablets by mouth Daily., Disp: , Rfl:     ondansetron ODT (ZOFRAN-ODT) 8 MG disintegrating  "tablet, DISSOLVE 1 TABLET IN MOUTH EVERY EIGHT HOURS AS NEEDED FOR NAUSEA AND VOMITING, Disp: 30 tablet, Rfl: 0    propranolol (INDERAL) 80 MG tablet, Take 1 tablet by mouth Daily., Disp: , Rfl:     rOPINIRole (REQUIP) 2 MG tablet, Take 1 tablet by mouth Every Night., Disp: , Rfl:     sertraline (ZOLOFT) 100 MG tablet, Take 1 tablet by mouth 2 (Two) Times a Day., Disp: , Rfl:     SUMAtriptan (IMITREX) 100 MG tablet, Take 1 tablet by mouth Every 2 (Two) Hours As Needed for Migraine. Take one tablet at onset of headache. May repeat dose one time in 2 hours if headache not relieved., Disp: , Rfl:     Tirzepatide (Mounjaro) 2.5 MG/0.5ML solution pen-injector, Inject 0.5 mL under the skin into the appropriate area as directed 1 (One) Time Per Week., Disp: , Rfl:     TiZANidine (ZANAFLEX) 4 MG capsule, Take 1 capsule by mouth 3 (Three) Times a Day As Needed for Muscle Spasms., Disp: , Rfl:     vitamin D (ERGOCALCIFEROL) 1.25 MG (46052 UT) capsule capsule, Take 1 capsule by mouth 1 (One) Time Per Week. Wednesdays, Disp: , Rfl:     Allergies   Allergen Reactions    Trulicity [Dulaglutide] Other (See Comments)     Caused pancreatitis    Penicillins Rash and Unknown - High Severity     Childhood Rx       Family History   Problem Relation Age of Onset    Cancer Mother 60        Breast    COPD Father 64    Heart disease Father     Obesity Maternal Grandmother        Social History     Tobacco Use    Smoking status: Never    Smokeless tobacco: Never   Substance Use Topics    Alcohol use: Never           Current Electrocardiogram:  Procedures        EMR Dragon/Transcription:   \"Dictated utilizing Dragon dictation\".     Copied text in this note has been reviewed by me and is accurate as of 04/22/24.    "

## 2024-07-02 ENCOUNTER — OFFICE VISIT (OUTPATIENT)
Dept: CARDIOLOGY | Facility: CLINIC | Age: 54
End: 2024-07-02
Payer: OTHER GOVERNMENT

## 2024-07-02 VITALS
WEIGHT: 241 LBS | SYSTOLIC BLOOD PRESSURE: 121 MMHG | DIASTOLIC BLOOD PRESSURE: 78 MMHG | HEIGHT: 61 IN | HEART RATE: 73 BPM | OXYGEN SATURATION: 98 % | BODY MASS INDEX: 45.5 KG/M2

## 2024-07-02 DIAGNOSIS — I63.9 ACUTE CEREBROVASCULAR ACCIDENT (CVA): ICD-10-CM

## 2024-07-02 DIAGNOSIS — I25.10 SMALL VESSEL CORONARY ARTERY DISEASE: Primary | ICD-10-CM

## 2024-07-02 DIAGNOSIS — G47.33 OBSTRUCTIVE SLEEP APNEA SYNDROME: ICD-10-CM

## 2024-07-02 DIAGNOSIS — E78.2 MIXED HYPERLIPIDEMIA: ICD-10-CM

## 2024-07-02 DIAGNOSIS — I10 BENIGN HYPERTENSION: ICD-10-CM

## 2024-07-02 RX ORDER — CLOPIDOGREL BISULFATE 75 MG/1
75 TABLET ORAL DAILY
COMMUNITY

## 2024-07-02 RX ORDER — ASPIRIN 81 MG/1
81 TABLET ORAL DAILY
COMMUNITY

## 2024-07-02 NOTE — PROGRESS NOTES
[facility] CARDIOLOGY      REASON FOR FOLLOW-UP:  Coronary artery disease  Hypertension  Hypertensive cardiovascular disease          Chief Complaint   Patient presents with   • Heart Problem         Dear Sony Wong MD        Heart Problem  Associated symptoms include headaches.      It was my pleasure to see Gissel in the office today.  She is a 53 year-old female with known history of coronary artery disease per heart catheterization performed 12/12/2018 in which she was found to have small vessel disease in the distal LAD with elevated LVEDP.  Left ventricular ejection fraction low normal at 50%.  She has additional history that includes diabetes mellitus 2, hypertension with hypertensive cardiovascular disease, dyslipidemia, history of lower extremity DVT on anticoagulation prior, history of heart failure with preserved ejection fraction.  2D echo performed 9/9/2020 with normal LV systolic function with EF 60-65%.  Trace TR/AR. Normal pulmonary artery pressure.      Gissel was hospitalized at [facility] 4/2/2024 for symptoms of chest pain.  Records were reviewed-she ruled out for acute coronary syndrome and underwent nuclear stress testing that showed no evidence of ischemia.  2D echocardiogram was also performed with normal LV function and wall motion with no valvular pathology reported.  Due to her ongoing symptoms and concern for underlying disease as well as false negative stress testing, left heart catheterization was performed that showed normal coronaries.    Gissel was admitted May 15, 2024 for stroke symptoms.  She reports onset while at work with speech difficulty followed by left upper and lower extremity weakness.  She was transferred to [facility].  CT/CTA head and neck were negative and patient was treated with thrombolytic therapy-tPA.  I was able to review neurology's follow-up note for some details.  She reports today that she still has  decreased strength in her left side.  She is receiving home PT/OT.  Plavix and aspirin were added to her medication regimen.  She reports no symptoms of chest pain, pressure, tightness or palpitations.  She denies any shortness of breath at rest, dyspnea with exertion, dizziness or lower extremity edema.  No vision changes.  She does report ongoing headache since her discharge.  She recently had follow-up brain MRI and is scheduled to see neurology next week to review and reevaluate.    She reports having recent labs per primary care office with A1c of 6, triglycerides elevated, additional lipids were within goal.  Labs reviewed from 4/2024: CBC within normal limits, creatinine 2.2.    Addendum: JOANNE results received from [facility]: EF 55-60%, grade 1 DD, normal RV structure and function, no evidence of thrombus within the LA or LEDY, bubble study with no shunting noted across the intra-atrial septum, mild aortic plaquing      ASSESSMENT:  Recent acute stroke with left hemiparesis treated with thrombolytic therapy  Coronary artery disease- small vessel.  No evidence of coronary disease on heart cath April 2024  History of heart failure with preserved ejection fraction  Hypertension with hypertensive cardiovascular disease  Dyslipidemia  Chronic kidney disease  History of lower extremity DVT  Morbid obesity with BMI 51.76 status post gastric sleeve surgery 10/19/2022  Trivial valvular heart disease    PLAN:  Plavix and aspirin were added following her stroke  Continue current CV plan of care to include Repatha, fenofibrate, losartan, propranolol  Risk factor modification including heart healthy diet, routine exercise as tolerated, healthy weight  We will call for recent labs and a copy of JOANNE performed at [facility] for our records  Follow-up in our office in 6 months or sooner if needed        CHF Guideline Directed Medical Therapy  Beta Blocker:   ARNI/ACE/ARB:   SGLT 2 inhibitors:   Diuretics:   Aldosterone  Antagonist:   Vasodilators & Nitrates:       Diagnoses and all orders for this visit:    1. Small vessel coronary artery disease (Primary)    2. Mixed hyperlipidemia    3. Benign hypertension    4. BMI 45.0-49.9, adult    5. Obstructive sleep apnea syndrome    6. Acute cerebrovascular accident (CVA)          The following portions of the patient's history were reviewed and updated as appropriate: allergies, current medications, past family history, past medical history, past social history, past surgical history, and problem list.    REVIEW OF SYSTEMS:    Review of Systems   Neurological:  Positive for focal weakness and headaches.   All other systems reviewed and are negative.      Vitals:    07/02/24 0800   BP: 121/78   Pulse: 73   SpO2: 98%         PHYSICAL EXAM:    General: Alert, cooperative, no distress, appears stated age  Head:  Normocephalic, atraumatic, mucous membranes moist  Eyes:  Conjunctiva/corneas clear, EOM's intact     Neck:  Supple,  no JVD or bruit     Lungs: Clear to auscultation bilaterally, no wheezes rhonchi rales are noted  Chest wall: No tenderness  Musculoskeletal:   Ambulates freely without assistance  Heart::  Regular rate and rhythm, S1 and S2 normal, no murmur, rub or gallop  Abdomen: Soft, non-tender, nondistended, bowel sounds active, no abdominal bruit  Extremities: No cyanosis, clubbing, or edema   Pulses: 2+ and symmetric all extremities  Skin:  No rashes or lesions  Neuro/psych: A&O x3. CN II through XII are grossly intact with appropriate affect        Past Medical History:   Diagnosis Date   • Anxiety    • Asthma    • CAD (coronary artery disease)    • Depression    • Diabetes    • MAX (dyspnea on exertion)    • Fibromyalgia    • GERD (gastroesophageal reflux disease)    • HTN (hypertension)    • Hyperlipemia    • IBS (irritable bowel syndrome)    • Plantar fasciitis    • Sleep apnea     cpap       Past Surgical History:   Procedure Laterality Date   • BREAST SURGERY Bilateral      2015, 2020   • CARDIAC CATHETERIZATION     • COLONOSCOPY  2017   • COLONOSCOPY W/ BIOPSIES AND POLYPECTOMY  06/15/2022    5 polyps removed   • ENDOSCOPY  2021   • GALLBLADDER SURGERY  2010   • GASTRIC SLEEVE LAPAROSCOPIC N/A 10/19/2022    Procedure: GASTRIC SLEEVE LAPAROSCOPIC WITH DAVINCI ROBOT;  Surgeon: Gloria Marinelli MD;  Location: [facility] MAIN OR;  Service: Robotics - DaVinci;  Laterality: N/A;   • HYSTERECTOMY  2013   • KNEE SURGERY Right 2017   • LAPAROSCOPIC TUBAL LIGATION  2000   • TONSILLECTOMY  1976         Current Outpatient Medications:   •  albuterol sulfate  (90 Base) MCG/ACT inhaler, Inhale 2 puffs Every 4 (Four) Hours As Needed., Disp: , Rfl:   •  amitriptyline (ELAVIL) 25 MG tablet, Take 1 tablet by mouth Every Night., Disp: , Rfl:   •  aspirin 81 MG EC tablet, Take 1 tablet by mouth Daily., Disp: , Rfl:   •  B-D UF III MINI PEN NEEDLES 31G X 5 MM misc, USE AS DIRECTED TWICE DAILY FOR INSULIN INJECTIONS, Disp: , Rfl:   •  busPIRone (BUSPAR) 15 MG tablet, Take 1 tablet by mouth As Needed (anxiety)., Disp: , Rfl:   •  Cariprazine HCl (VRAYLAR) 1.5 MG capsule capsule, Take 1 capsule by mouth Every Night., Disp: , Rfl:   •  Cholecalciferol 125 MCG (5000 UT) tablet, cholecalciferol (vitamin D3) 125 mcg (5,000 unit) tablet  TAKE 1 TABLET BY MOUTH EVERY DAY, Disp: , Rfl:   •  clopidogrel (PLAVIX) 75 MG tablet, Take 1 tablet by mouth Daily., Disp: , Rfl:   •  Continuous Blood Gluc Sensor (Dexcom G6 Sensor), Every 10 (Ten) Days., Disp: , Rfl:   •  Continuous Blood Gluc Sensor (FreeStyle Sushma 14 Day Sensor) misc, replace 1 sensor every 14 days, Disp: , Rfl:   •  Dapagliflozin Propanediol (Farxiga) 10 MG tablet, Take 10 mg by mouth Every Night., Disp: , Rfl:   •  doxepin (SINEquan) 25 MG capsule, Take 1 capsule by mouth Every Night., Disp: , Rfl:   •  Evolocumab (REPATHA) solution auto-injector SureClick injection, Inject 1 mL under the skin into the appropriate area as directed Every 14 (Fourteen)  Days., Disp: 1 mL, Rfl: 3  •  fenofibrate (TRICOR) 145 MG tablet, Take 1 tablet by mouth Daily., Disp: , Rfl:   •  Fluticasone-Umeclidin-Vilant (Trelegy Ellipta) 100-62.5-25 MCG/INH inhaler, Trelegy Ellipta 100 mcg-62.5 mcg-25 mcg powder for inhalation  INHALE 1 PUFF BY MOUTH EVERY DAY, Disp: , Rfl:   •  gabapentin (NEURONTIN) 600 MG tablet, Take 1 tablet by mouth 3 (Three) Times a Day As Needed., Disp: , Rfl:   •  glucose blood test strip, Accu-Chek Guide test strips, Disp: , Rfl:   •  HYDROcodone-acetaminophen (NORCO)  MG per tablet, Take 1 tablet by mouth Every 8 (Eight) Hours As Needed (chronic back pain)., Disp: , Rfl:   •  Insulin Pen Needle (GNP UltiCare Pen Needles) 32G X 4 MM misc, 32g 5/32, Disp: , Rfl:   •  Insulin Regular Human (HUMULIN R U-500 [facility]), 150 Units. 75 am and  75 pm, Disp: , Rfl:   •  losartan (COZAAR) 25 MG tablet, Take 4 tablets by mouth Daily., Disp: , Rfl:   •  ondansetron ODT (ZOFRAN-ODT) 8 MG disintegrating tablet, DISSOLVE 1 TABLET IN MOUTH EVERY EIGHT HOURS AS NEEDED FOR NAUSEA AND VOMITING, Disp: 30 tablet, Rfl: 0  •  propranolol (INDERAL) 80 MG tablet, Take 1 tablet by mouth Daily., Disp: , Rfl:   •  rOPINIRole (REQUIP) 2 MG tablet, Take 1 tablet by mouth Every Night., Disp: , Rfl:   •  sertraline (ZOLOFT) 100 MG tablet, Take 1 tablet by mouth 2 (Two) Times a Day., Disp: , Rfl:   •  SUMAtriptan (IMITREX) 100 MG tablet, Take 1 tablet by mouth Every 2 (Two) Hours As Needed for Migraine. Take one tablet at onset of headache. May repeat dose one time in 2 hours if headache not relieved., Disp: , Rfl:   •  Tirzepatide (Mounjaro) 2.5 MG/0.5ML solution pen-injector, Inject 0.5 mL under the skin into the appropriate area as directed 1 (One) Time Per Week., Disp: , Rfl:   •  TiZANidine (ZANAFLEX) 4 MG capsule, Take 1 capsule by mouth 3 (Three) Times a Day As Needed for Muscle Spasms., Disp: , Rfl:   •  vitamin D (ERGOCALCIFEROL) 1.25 MG (41257 UT) capsule capsule, Take 1 capsule  "by mouth 1 (One) Time Per Week. Wednesdays, Disp: , Rfl:     Allergies   Allergen Reactions   • Trulicity [Dulaglutide] Other (See Comments)     Caused pancreatitis   • Penicillins Rash and Unknown - High Severity     Childhood Rx       Family History   Problem Relation Age of Onset   • Cancer Mother 60        Breast   • COPD Father 64   • Heart disease Father    • Obesity Maternal Grandmother        Social History     Tobacco Use   • Smoking status: Never   • Smokeless tobacco: Never   Substance Use Topics   • Alcohol use: Never           Current Electrocardiogram:    ECG 12 Lead    Date/Time: 7/2/2024 2:21 PM  Performed by: Fidelina Cronin APRN    Authorized by: Fidelina Cronin APRN  Comparison: compared with previous ECG from 4/22/2024  Rhythm: sinus rhythm  BPM: 73            EMR Dragon/Transcription:   \"Dictated utilizing Dragon dictation\".     Copied text in this note has been reviewed by me and is accurate as of 07/02/24.    "

## 2024-07-16 ENCOUNTER — OFFICE (AMBULATORY)
Dept: URBAN - METROPOLITAN AREA CLINIC 64 | Facility: CLINIC | Age: 54
End: 2024-07-16
Payer: OTHER GOVERNMENT

## 2024-07-16 VITALS
DIASTOLIC BLOOD PRESSURE: 80 MMHG | HEART RATE: 75 BPM | SYSTOLIC BLOOD PRESSURE: 132 MMHG | HEIGHT: 62 IN | WEIGHT: 241 LBS

## 2024-07-16 DIAGNOSIS — R13.10 DYSPHAGIA, UNSPECIFIED: ICD-10-CM

## 2024-07-16 DIAGNOSIS — Z79.02 LONG TERM (CURRENT) USE OF ANTITHROMBOTICS/ANTIPLATELETS: ICD-10-CM

## 2024-07-16 DIAGNOSIS — R11.0 NAUSEA: ICD-10-CM

## 2024-07-16 DIAGNOSIS — K58.0 IRRITABLE BOWEL SYNDROME WITH DIARRHEA: ICD-10-CM

## 2024-07-16 PROCEDURE — 99214 OFFICE O/P EST MOD 30 MIN: CPT | Performed by: NURSE PRACTITIONER

## 2024-07-26 ENCOUNTER — ON CAMPUS - OUTPATIENT (AMBULATORY)
Dept: URBAN - METROPOLITAN AREA HOSPITAL 2 | Facility: HOSPITAL | Age: 54
End: 2024-07-26
Payer: OTHER GOVERNMENT

## 2024-07-26 ENCOUNTER — OFFICE (AMBULATORY)
Dept: URBAN - METROPOLITAN AREA PATHOLOGY 19 | Facility: PATHOLOGY | Age: 54
End: 2024-07-26
Payer: OTHER GOVERNMENT

## 2024-07-26 VITALS
HEART RATE: 73 BPM | HEART RATE: 81 BPM | RESPIRATION RATE: 18 BRPM | SYSTOLIC BLOOD PRESSURE: 164 MMHG | HEART RATE: 68 BPM | SYSTOLIC BLOOD PRESSURE: 109 MMHG | HEART RATE: 76 BPM | OXYGEN SATURATION: 98 % | DIASTOLIC BLOOD PRESSURE: 66 MMHG | DIASTOLIC BLOOD PRESSURE: 80 MMHG | HEART RATE: 69 BPM | DIASTOLIC BLOOD PRESSURE: 69 MMHG | DIASTOLIC BLOOD PRESSURE: 60 MMHG | DIASTOLIC BLOOD PRESSURE: 91 MMHG | RESPIRATION RATE: 16 BRPM | SYSTOLIC BLOOD PRESSURE: 143 MMHG | OXYGEN SATURATION: 95 % | RESPIRATION RATE: 20 BRPM | SYSTOLIC BLOOD PRESSURE: 102 MMHG | RESPIRATION RATE: 17 BRPM | WEIGHT: 241 LBS | OXYGEN SATURATION: 96 % | SYSTOLIC BLOOD PRESSURE: 113 MMHG | HEIGHT: 62 IN | HEART RATE: 74 BPM | OXYGEN SATURATION: 100 % | TEMPERATURE: 97.8 F | SYSTOLIC BLOOD PRESSURE: 146 MMHG | DIASTOLIC BLOOD PRESSURE: 95 MMHG | HEART RATE: 64 BPM | OXYGEN SATURATION: 94 %

## 2024-07-26 DIAGNOSIS — Z48.815 ENCOUNTER FOR SURGICAL AFTERCARE FOLLOWING SURGERY ON THE DI: ICD-10-CM

## 2024-07-26 DIAGNOSIS — R13.10 DYSPHAGIA, UNSPECIFIED: ICD-10-CM

## 2024-07-26 DIAGNOSIS — K31.7 POLYP OF STOMACH AND DUODENUM: ICD-10-CM

## 2024-07-26 DIAGNOSIS — K29.30 CHRONIC SUPERFICIAL GASTRITIS WITHOUT BLEEDING: ICD-10-CM

## 2024-07-26 LAB
GI HISTOLOGY: A. STOMACH ANTRUM: (no result)
GI HISTOLOGY: PDF REPORT: (no result)

## 2024-07-26 PROCEDURE — 88342 IMHCHEM/IMCYTCHM 1ST ANTB: CPT | Performed by: PATHOLOGY

## 2024-07-26 PROCEDURE — 43239 EGD BIOPSY SINGLE/MULTIPLE: CPT | Performed by: INTERNAL MEDICINE

## 2024-07-26 PROCEDURE — 43450 DILATE ESOPHAGUS 1/MULT PASS: CPT | Performed by: INTERNAL MEDICINE

## 2024-07-26 PROCEDURE — 88305 TISSUE EXAM BY PATHOLOGIST: CPT | Performed by: PATHOLOGY

## 2024-08-08 ENCOUNTER — INPATIENT HOSPITAL (AMBULATORY)
Dept: URBAN - METROPOLITAN AREA HOSPITAL 76 | Facility: HOSPITAL | Age: 54
End: 2024-08-08
Payer: OTHER GOVERNMENT

## 2024-08-08 DIAGNOSIS — R10.9 UNSPECIFIED ABDOMINAL PAIN: ICD-10-CM

## 2024-08-08 DIAGNOSIS — K62.5 HEMORRHAGE OF ANUS AND RECTUM: ICD-10-CM

## 2024-08-08 DIAGNOSIS — Z87.19 PERSONAL HISTORY OF OTHER DISEASES OF THE DIGESTIVE SYSTEM: ICD-10-CM

## 2024-08-08 DIAGNOSIS — K21.9 GASTRO-ESOPHAGEAL REFLUX DISEASE WITHOUT ESOPHAGITIS: ICD-10-CM

## 2024-08-08 DIAGNOSIS — Z86.010 PERSONAL HISTORY OF COLONIC POLYPS: ICD-10-CM

## 2024-08-08 PROCEDURE — 99222 1ST HOSP IP/OBS MODERATE 55: CPT | Performed by: INTERNAL MEDICINE

## 2024-08-09 ENCOUNTER — INPATIENT HOSPITAL (AMBULATORY)
Dept: URBAN - METROPOLITAN AREA HOSPITAL 76 | Facility: HOSPITAL | Age: 54
End: 2024-08-09
Payer: OTHER GOVERNMENT

## 2024-08-09 DIAGNOSIS — R10.9 UNSPECIFIED ABDOMINAL PAIN: ICD-10-CM

## 2024-08-09 DIAGNOSIS — K62.5 HEMORRHAGE OF ANUS AND RECTUM: ICD-10-CM

## 2024-08-09 PROCEDURE — 99231 SBSQ HOSP IP/OBS SF/LOW 25: CPT | Performed by: INTERNAL MEDICINE

## 2024-08-28 ENCOUNTER — OFFICE (AMBULATORY)
Age: 54
End: 2024-08-28
Payer: OTHER GOVERNMENT

## 2024-08-28 ENCOUNTER — OFFICE (AMBULATORY)
Dept: URBAN - METROPOLITAN AREA CLINIC 64 | Facility: CLINIC | Age: 54
End: 2024-08-28
Payer: OTHER GOVERNMENT

## 2024-08-28 VITALS
SYSTOLIC BLOOD PRESSURE: 136 MMHG | DIASTOLIC BLOOD PRESSURE: 80 MMHG | HEART RATE: 56 BPM | HEIGHT: 62 IN | WEIGHT: 238 LBS | HEIGHT: 62 IN | HEIGHT: 62 IN | SYSTOLIC BLOOD PRESSURE: 136 MMHG | HEART RATE: 56 BPM | SYSTOLIC BLOOD PRESSURE: 136 MMHG | WEIGHT: 238 LBS | WEIGHT: 238 LBS | WEIGHT: 238 LBS | DIASTOLIC BLOOD PRESSURE: 80 MMHG | HEART RATE: 56 BPM | DIASTOLIC BLOOD PRESSURE: 80 MMHG | DIASTOLIC BLOOD PRESSURE: 80 MMHG | HEART RATE: 56 BPM | DIASTOLIC BLOOD PRESSURE: 80 MMHG | DIASTOLIC BLOOD PRESSURE: 80 MMHG | HEART RATE: 56 BPM | WEIGHT: 238 LBS | DIASTOLIC BLOOD PRESSURE: 80 MMHG | SYSTOLIC BLOOD PRESSURE: 136 MMHG | WEIGHT: 238 LBS | SYSTOLIC BLOOD PRESSURE: 136 MMHG | HEART RATE: 56 BPM | SYSTOLIC BLOOD PRESSURE: 136 MMHG | HEIGHT: 62 IN | HEIGHT: 62 IN | HEIGHT: 62 IN | SYSTOLIC BLOOD PRESSURE: 136 MMHG | HEIGHT: 62 IN | WEIGHT: 238 LBS | HEART RATE: 56 BPM

## 2024-08-28 DIAGNOSIS — R15.2 FECAL URGENCY: ICD-10-CM

## 2024-08-28 DIAGNOSIS — R19.7 DIARRHEA, UNSPECIFIED: ICD-10-CM

## 2024-08-28 DIAGNOSIS — D69.6 THROMBOCYTOPENIA, UNSPECIFIED: ICD-10-CM

## 2024-08-28 PROCEDURE — 99214 OFFICE O/P EST MOD 30 MIN: CPT | Performed by: NURSE PRACTITIONER

## 2024-08-28 RX ORDER — COLESEVELAM HYDROCHLORIDE 625 MG/1
625 TABLET, FILM COATED ORAL
Qty: 30 | Refills: 11 | Status: ACTIVE
Start: 2024-08-28

## 2024-10-07 NOTE — PROGRESS NOTES
Chief Complaint  NEW PATIENT (CVA AND HEADACHES)    Subjective            Gissel Amaro presents to [facility] NEUROLOGY for Left sided cerebral hemisphere cerebrovascular accident/HEADACHES  History of Present Illness  New patient referred by Dr. Wong for Left BODY , RIGHT cerebral hemisphere cerebrovascular accident happened 5/15/24. POST NKA MRI WAS NORMAL   NO MIGRAINE ON DAY OF THE STROKE.   Patient states she was at work on a phone call and started to feel weird and she started to stumble,   she then had her blood sugar checked but it was normal she states she then went to [facility] and could walk about time she arrived there.     Patient states her short term memory and headaches is a residual effect from stroke,   she states headaches are constant she states she gets headaches all over no certain areas   she states she does have a h/o migraines and currently taking emgality.   She states her last headache was 2 days ago and lasted all day    PRIOR TO STROKE IN MAY HAD INFREQUENT MIGRAINE RELIEVED WITH TYLENOL, OTHER DAYS NO HEADACHE  EVER SINCE STROKE HAS HAD INFREQUENT MIGRAINE, BUT NOW HAS DAILY HEADACHE  DAILY HEADACHE: ENTIRE HEAD, ACHE, ABOUT THE SAME ALL DAY LONG, IF MIGRAINE IS A 5 ON A SCALE OF 0-5, DAILY GARZA IS A 3  EMGAILITY FOR THREE MONTHS, HAS NOT HELPED THE DAILY HEADACHE.   ===========================neuro / dr Fleming=======================  Cony Fleming MD - 07/30/2024 10:48 AM EDT  Formatting of this note is different from the original.  Neurology follow-up visit    History of present illness  Gissel Amaro is a 53 yr/o female who has seen me once on 04.25.2023 for migraines. She had acute onset of left arm and leg weakness that occurred while at work. She had tPA at [facility] in [place], [place] on 05.15.2024. She was discharged on 05.18.2024 but still had left sided weakness, incoordination, and ataxia. I ordered PT/OT and her physical deficits have  resolved. However, she continues to have extreme irritability, insomnia and headaches.   --------------------------  Review of diagnostic data  CTA head and neck negative on 05.15.2024.  tPA given 5/15 11:43AM  Neuro status stable. MRI negative for acute stroke. CT head s/p TPA negative for hemorrhage.  JOANNE done at [facility], no LAE, PFO or thrombus.  MRI brain 06.21.2024 was negative foe any acute findings.    assessment  Ms. Amaro is a 53 yr/o who has had migraines since she was 19 years old. She apparently had acute onset of left sided weakness on 05.15.2024 and went to [facility] in [place], [place]. Her symptoms were consistent with an acute stroke and she received tPA after a head CT/CTA and CTA neck were negative. Her head CT and brain MRI 24 hours s/p tPA were also negative as was her JOANNE. Repeat brain MRI on 06.21.2024 was negative for stroke, bleed, or mass. Her left sided weakness, incoordination, and ataxia have resolved but she continues to have extreme irritability, insomnia, and headaches.    plan  I will increase her amitriptyline to 100 mg qhs and she will return for fu in one month. Her physical deficits have resolved and I will release her back to work without restrictions.  Electronically signed by Cony Fleming MD at 07/30/2024 11:02 AM EDT   ==============================================================================  ==========neuro dr fleming===============================================  Cony Fleming MD - 07/09/2024 3:48 PM EDT  Formatting of this note is different from the original.  Neurology follow-up visit    History of present illness  Gissel Amaro is a 53 yr/o female who has seen me once on 04.25.2023 for migraines. She presents to the office today for fu after a stroke suffered on 05.15.2024. She had acute onset of left arm and leg weakness that occurred while at work. She had tPA at [facility] in [place], [place]. She was discharged on 05.18.2024 but  still had left sided weakness, incoordination, and ataxia. I ordered PT/OT on her last visit and her physical deficits have resolved. However, she continues to have extreme irritability and headaches.  -----------------------------------------  Review of diagnostic data  CTA head and neck negative on 05.15.2024.  tPA given 5/15 11:43AM  Neuro status stable. MRI negative for acute stroke. CT head s/p TPA negative for hemorrhage.  JOANNE done at [facility], no LAE, PFO or thrombus.  MRI brain 06.21.2024 was negative foe any acute findings.    assessment  Ms. Amaro is a 53 yr/o who has had migraines since she was 19 years old. She now presents after her first and last visit to me more than year ago for post stroke fu. She apparently had acute onset of left sided weakness six days ago and went to [facility] in [place], [place]. Her symptoms were consistent with an acute stroke and she received tPA after a head CT/CTA and CTA neck were negative. Her head CT and brain MRI 24 hours s/p tPA were also negative as was her JOANNE. Repeat brain MRI on 06.21.2024 was negative for stroke, bleed, or mass. Her left sided weakness, incoordination, and ataxia have resolved but she continues to have extreme irritability and headaches.    plan  I will increase her amitriptyline to 50 mg qhs and she will return for fu in one month.  Electronically signed by Cony Fleming MD at 07/09/2024 3:56 PM EDT   ====================================      =====EXAMINATION: CT Head WO Code Stroke  5/15/24=====  IMPRESSION: Chronic small vessel disease and involutional changes not atypical for age with no evidence of acute intracranial infarct, hemorrhage or mass effect.     Dictated by: Cameron Howard M.D. Signed by Cameron Howard M.D. on 5/15/2024 12:44     Family History   Problem Relation Age of Onset    Cancer Mother 60        Breast    COPD Father 64    Heart disease Father     Obesity Maternal Grandmother        Past Medical History:    Diagnosis Date    Anxiety     Asthma     CAD (coronary artery disease)     Depression     Diabetes     MAX (dyspnea on exertion)     Fibromyalgia     GERD (gastroesophageal reflux disease)     HTN (hypertension)     Hyperlipemia     IBS (irritable bowel syndrome)     Plantar fasciitis     Sleep apnea     cpap       Social History     Socioeconomic History    Marital status:    Tobacco Use    Smoking status: Never    Smokeless tobacco: Never   Vaping Use    Vaping status: Never Used   Substance and Sexual Activity    Alcohol use: Never    Drug use: Never    Sexual activity: Defer         Current Outpatient Medications:     albuterol sulfate  (90 Base) MCG/ACT inhaler, Inhale 2 puffs Every 4 (Four) Hours As Needed., Disp: , Rfl:     amitriptyline (ELAVIL) 25 MG tablet, Take 1 tablet by mouth Every Night., Disp: , Rfl:     aspirin 81 MG EC tablet, Take 1 tablet by mouth Daily., Disp: , Rfl:     B-D UF III MINI PEN NEEDLES 31G X 5 MM misc, USE AS DIRECTED TWICE DAILY FOR INSULIN INJECTIONS, Disp: , Rfl:     busPIRone (BUSPAR) 15 MG tablet, Take 1 tablet by mouth As Needed (anxiety)., Disp: , Rfl:     Cariprazine HCl (VRAYLAR) 1.5 MG capsule capsule, Take 1 capsule by mouth Every Night., Disp: , Rfl:     Continuous Blood Gluc Sensor (Dexcom G6 Sensor), Every 10 (Ten) Days., Disp: , Rfl:     Continuous Blood Gluc Sensor (FreeStyle Sushma 14 Day Sensor) misc, replace 1 sensor every 14 days, Disp: , Rfl:     Dapagliflozin Propanediol (Farxiga) 10 MG tablet, Take 10 mg by mouth Every Night., Disp: , Rfl:     doxepin (SINEquan) 25 MG capsule, Take 1 capsule by mouth Every Night., Disp: , Rfl:     Evolocumab (REPATHA) solution auto-injector SureClick injection, Inject 1 mL under the skin into the appropriate area as directed Every 14 (Fourteen) Days., Disp: 1 mL, Rfl: 3    fenofibrate (TRICOR) 145 MG tablet, Take 1 tablet by mouth Daily., Disp: , Rfl:     Fluticasone-Umeclidin-Vilant (Trelegy Ellipta)  100-62.5-25 MCG/INH inhaler, Trelegy Ellipta 100 mcg-62.5 mcg-25 mcg powder for inhalation  INHALE 1 PUFF BY MOUTH EVERY DAY, Disp: , Rfl:     gabapentin (NEURONTIN) 600 MG tablet, Take 1 tablet by mouth 3 (Three) Times a Day As Needed., Disp: , Rfl:     galcanezumab-gnlm (Emgality) 120 MG/ML auto-injector pen, Inject 1 mL every 4 weeks by subcutaneous route., Disp: , Rfl:     glucose blood test strip, Accu-Chek Guide test strips, Disp: , Rfl:     HYDROcodone-acetaminophen (NORCO)  MG per tablet, Take 1 tablet by mouth Every 8 (Eight) Hours As Needed (chronic back pain)., Disp: , Rfl:     Insulin Pen Needle (GNP UltiCare Pen Needles) 32G X 4 MM misc, 32g 5/32, Disp: , Rfl:     Insulin Regular Human (HUMULIN R U-500 [facility]), 150 Units. 75 am and  75 pm, Disp: , Rfl:     losartan (COZAAR) 25 MG tablet, Take 4 tablets by mouth Daily., Disp: , Rfl:     nystatin (MYCOSTATIN) 048923 UNIT/GM powder, APPLY TO THE AFFECTED AREA(S) BY TOPICAL ROUTE 2 TIMES PER DAY, Disp: , Rfl:     ondansetron ODT (ZOFRAN-ODT) 8 MG disintegrating tablet, DISSOLVE 1 TABLET IN MOUTH EVERY EIGHT HOURS AS NEEDED FOR NAUSEA AND VOMITING, Disp: 30 tablet, Rfl: 0    pantoprazole (PROTONIX) 40 MG EC tablet, Take 1 tablet by mouth twice a day for 30 days, Disp: , Rfl:     propranolol (INDERAL) 80 MG tablet, Take 1 tablet by mouth Daily., Disp: , Rfl:     rOPINIRole (REQUIP) 2 MG tablet, Take 1 tablet by mouth Every Night., Disp: , Rfl:     sertraline (ZOLOFT) 100 MG tablet, Take 1 tablet by mouth 2 (Two) Times a Day., Disp: , Rfl:     Tirzepatide (Mounjaro) 10 MG/0.5ML solution pen-injector pen, Inject 10 mg every week by subcutaneous route., Disp: , Rfl:     TiZANidine (ZANAFLEX) 4 MG capsule, Take 1 capsule by mouth 3 (Three) Times a Day As Needed for Muscle Spasms., Disp: , Rfl:     vitamin D (ERGOCALCIFEROL) 1.25 MG (44226 UT) capsule capsule, Take 1 capsule by mouth 1 (One) Time Per Week. Wednesdays, Disp: , Rfl:     Review of Systems  "  Constitutional:  Positive for appetite change.   HENT:  Positive for hearing loss.    Respiratory:  Positive for apnea.    Gastrointestinal:  Positive for nausea.   Musculoskeletal:  Positive for back pain.   Allergic/Immunologic: Positive for environmental allergies.   Neurological:  Positive for headaches.   Psychiatric/Behavioral:  Positive for sleep disturbance.             Objective   Vital Signs:   /73   Pulse 74   Ht 154.9 cm (61\")   Wt 106 kg (234 lb)   BMI 44.21 kg/m²     Physical Exam  Vitals reviewed.   Constitutional:       Appearance: Normal appearance.   Pulmonary:      Effort: Pulmonary effort is normal. No respiratory distress.   Neurological:      General: No focal deficit present.      Mental Status: She is alert and oriented to person, place, and time.   Psychiatric:         Mood and Affect: Mood normal.         Speech: Speech normal.        Result Review :                Neurological Exam  Mental Status  Alert. Oriented to person, place and time. Oriented to person, place, and time. Speech is normal.             Assessment and Plan    Diagnoses and all orders for this visit:    1. Obstructive sleep apnea syndrome (Primary)    2. Intractable migraine with aura without status migrainosus    3. Restless legs    CONTINUE CPAP    CONTINUE REQUIP FOR RLS    CHANGE TO QULIPTA, AS EMGAILITY HAS NOT HELPED THE HEADACHES.   CONTINUE INDERAL AND ELAVIL AS THESE MEDICATIONS DUE HELP PREVENT MIGRAINE      Follow Up   Return in about 6 months (around 4/8/2025).  Patient was given instructions and counseling regarding her condition or for health maintenance advice. Please see specific information pulled into the AVS if appropriate.         This document has been electronically signed by Joseph Seipel, MD on October 8, 2024 10:13 EDT    "

## 2024-10-08 ENCOUNTER — OFFICE VISIT (OUTPATIENT)
Dept: NEUROLOGY | Facility: CLINIC | Age: 54
End: 2024-10-08
Payer: OTHER GOVERNMENT

## 2024-10-08 VITALS
BODY MASS INDEX: 44.18 KG/M2 | HEIGHT: 61 IN | SYSTOLIC BLOOD PRESSURE: 111 MMHG | DIASTOLIC BLOOD PRESSURE: 73 MMHG | HEART RATE: 74 BPM | WEIGHT: 234 LBS

## 2024-10-08 DIAGNOSIS — G43.119 INTRACTABLE MIGRAINE WITH AURA WITHOUT STATUS MIGRAINOSUS: ICD-10-CM

## 2024-10-08 DIAGNOSIS — G25.81 RESTLESS LEGS: ICD-10-CM

## 2024-10-08 DIAGNOSIS — G47.33 OBSTRUCTIVE SLEEP APNEA SYNDROME: Primary | ICD-10-CM

## 2024-10-08 PROBLEM — G56.00 CARPAL TUNNEL SYNDROME: Status: ACTIVE | Noted: 2024-09-25

## 2024-10-08 PROBLEM — D69.6 THROMBOCYTOPENIA: Status: ACTIVE | Noted: 2024-10-08

## 2024-10-08 PROBLEM — E53.8 VITAMIN B12 DEFICIENCY (NON ANEMIC): Status: ACTIVE | Noted: 2022-03-11

## 2024-10-08 PROBLEM — N18.4 STAGE 4 CHRONIC KIDNEY DISEASE: Status: ACTIVE | Noted: 2024-10-08

## 2024-10-08 PROBLEM — K21.9 GASTROESOPHAGEAL REFLUX DISEASE WITHOUT ESOPHAGITIS: Status: ACTIVE | Noted: 2022-03-11

## 2024-10-08 PROBLEM — K31.7 GASTRIC POLYP: Status: ACTIVE | Noted: 2023-04-18

## 2024-10-08 PROBLEM — Z86.0100 HISTORY OF COLONIC POLYPS: Status: ACTIVE | Noted: 2024-10-08

## 2024-10-08 PROBLEM — E55.9 VITAMIN D DEFICIENCY: Status: ACTIVE | Noted: 2023-04-18

## 2024-10-08 PROBLEM — F41.1 GENERALIZED ANXIETY DISORDER: Status: ACTIVE | Noted: 2023-05-25

## 2024-10-08 PROCEDURE — 99204 OFFICE O/P NEW MOD 45 MIN: CPT | Performed by: PSYCHIATRY & NEUROLOGY

## 2024-10-08 RX ORDER — ATOGEPANT 60 MG/1
60 TABLET ORAL DAILY
Qty: 30 TABLET | Refills: 5 | Status: SHIPPED | OUTPATIENT
Start: 2024-10-08

## 2024-10-08 RX ORDER — NYSTATIN 100000 [USP'U]/G
POWDER TOPICAL
COMMUNITY
Start: 2023-08-08

## 2024-10-08 RX ORDER — PANTOPRAZOLE SODIUM 40 MG/1
TABLET, DELAYED RELEASE ORAL
COMMUNITY
Start: 2023-05-25

## 2024-10-08 RX ORDER — TIRZEPATIDE 10 MG/.5ML
INJECTION, SOLUTION SUBCUTANEOUS
COMMUNITY
Start: 2024-04-08

## 2024-10-08 RX ORDER — GALCANEZUMAB 120 MG/ML
INJECTION, SOLUTION SUBCUTANEOUS
COMMUNITY
Start: 2024-08-05

## 2024-10-12 ENCOUNTER — PATIENT ROUNDING (BHMG ONLY) (OUTPATIENT)
Dept: NEUROLOGY | Facility: CLINIC | Age: 54
End: 2024-10-12
Payer: OTHER GOVERNMENT

## 2024-10-21 ENCOUNTER — OFFICE VISIT (OUTPATIENT)
Dept: BARIATRICS/WEIGHT MGMT | Facility: CLINIC | Age: 54
End: 2024-10-21
Payer: OTHER GOVERNMENT

## 2024-10-21 VITALS — HEIGHT: 61 IN | BODY MASS INDEX: 45.22 KG/M2 | WEIGHT: 239.5 LBS

## 2024-10-21 DIAGNOSIS — E66.01 OBESITY, CLASS III, BMI 40-49.9 (MORBID OBESITY): Primary | ICD-10-CM

## 2024-10-21 PROCEDURE — 97803 MED NUTRITION INDIV SUBSEQ: CPT | Performed by: DIETITIAN, REGISTERED

## 2024-10-21 NOTE — PROGRESS NOTES
Nutrition Services    Patient Name: Gissel Amaro  YOB: 1970  MRN: [id number]  Date of Service: 10/21/24      ICD-10-CM ICD-9-CM   1. Obesity, Class III, BMI 40-49.9 (morbid obesity)  E66.01 278.01          NUTRITION ASSESSMENT - BARIATRIC SURGERY      Reason for Visit 2 years post op VSG     H&P      Past Medical History:   Diagnosis Date    Anxiety     Asthma     CAD (coronary artery disease)     Depression     Diabetes     MAX (dyspnea on exertion)     Fibromyalgia     GERD (gastroesophageal reflux disease)     HTN (hypertension)     Hyperlipemia     IBS (irritable bowel syndrome)     Plantar fasciitis     Sleep apnea     cpap       Past Surgical History:   Procedure Laterality Date    BREAST SURGERY Bilateral     2015, 2020    CARDIAC CATHETERIZATION      COLONOSCOPY  2017    COLONOSCOPY W/ BIOPSIES AND POLYPECTOMY  06/15/2022    5 polyps removed    ENDOSCOPY  2021    GALLBLADDER SURGERY  2010    GASTRIC SLEEVE LAPAROSCOPIC N/A 10/19/2022    Procedure: GASTRIC SLEEVE LAPAROSCOPIC WITH [employer] ROBOT;  Surgeon: Gloria Marinelli MD;  Location: [facility] OR;  Service: Robotics - DaVinci;  Laterality: N/A;    HYSTERECTOMY  2013    KNEE SURGERY Right 2017    LAPAROSCOPIC TUBAL LIGATION  2000    TONSILLECTOMY  1976        Previous Goals          Encounter Information        Visit Narrative     Patient reports she has been unable to lose weight. Patient states she has been trying to meet protein goals. Patient unable to drink protein shakes. Patient had stroke in May and has been unable to get in consistent exercise. Encouraged patient to add in chair exercises as able and aim for 2 days each week. Encouraged patient to increase protein intake and aim for 60-80 grams each day. Patient to follow up in 3 months to monitor progress.     Diet Recall:   Breakfast: duncan and eggs sometimes with biscuit  Lunch: skips sometimes or salads when hungry  Dinner: out to eat - [employer] (steak, potato and  "salad)  Snacks: mixed nuts, beef jerky, quest chips  Beverages: water - 80oz/day    Exercise: patient is exercising as much as possible but patient had stroke in May and gets tired easily    Supplements: taking bariatric MV    Self Monitoring:          Anthropometrics        Current Height, Weight Height: 154.9 cm (61\")  Weight: 109 kg (239 lb 8 oz) (10/21/24 1531)            Wt Readings from Last 30 Encounters:   10/21/24 1531 109 kg (239 lb 8 oz)   10/08/24 0932 106 kg (234 lb)   07/02/24 0800 109 kg (241 lb)   04/22/24 0935 110 kg (242 lb)   01/05/24 0828 112 kg (248 lb)   10/20/23 1337 116 kg (256 lb 6.4 oz)   06/22/23 0807 115 kg (253 lb)   04/19/23 1336 115 kg (254 lb 3.2 oz)   01/16/23 0952 115 kg (254 lb 9.6 oz)   12/06/22 0950 116 kg (256 lb)   11/21/22 1021 117 kg (257 lb 9.6 oz)   10/24/22 1030 119 kg (263 lb 3.2 oz)   10/19/22 2145 128 kg (281 lb 3.2 oz)   10/19/22 0600 123 kg (271 lb 6.4 oz)   10/05/22 1527 127 kg (279 lb)   09/30/22 1003 128 kg (281 lb 3.2 oz)   09/01/22 1357 125 kg (276 lb 9.6 oz)   08/08/22 1029 125 kg (275 lb 3.2 oz)   07/14/22 1317 124 kg (272 lb 6.4 oz)   06/17/22 1335 123 kg (270 lb 6.4 oz)   05/31/22 1300 122 kg (268 lb)   05/18/22 1417 122 kg (269 lb)   04/26/22 1123 124 kg (272 lb 9.6 oz)   11/29/21 1323 128 kg (283 lb)   08/23/21 1320 122 kg (270 lb)   02/22/21 1500 117 kg (259 lb)   11/19/20 1450 111 kg (245 lb)   10/01/20 1409 114 kg (252 lb)   10/24/19 1333 103 kg (227 lb)   05/02/19 1350 105 kg (232 lb)   01/14/19 1600 107 kg (235 lb)   10/23/18 1337 108 kg (237 lb)      BMI kg/m2 Body mass index is 45.25 kg/m².       Nutrition Diagnosis         Nutrition Dx Statement Overweight/obesity RT multifactorial biochemical, behavioral and environmental contributors to disease AEB BMI 45.25 kg/m^2         Nutrition Intervention         Nutrition Intervention Nutrition education related to diet modification and physical activity        Monitor/Evaluation        New Goals Patient " to add in exercise 2 days each week and try chair exercises or bike at home  Patient to increase protein intake and aim for 60-80 grams each day       Total time spent with pt 15 minutes of which 15 minutes were spent on education.       Electronically signed by:  Olga Lidia Schultz RD  10/21/24 15:41 EDT

## 2024-11-11 NOTE — PROGRESS NOTES
"                         HEMATOLOGY ONCOLOGY OUTPATIENT CONSULTATION       Patient name: Gissel Amaro  : 1970  MRN: [id number]  Primary Care Physician: Sony Wong MD  Referring Physician: Sony Wong*  Reason For Consult: thrombocytopenia      History of Present Illness:  Patient is a 54 y.o. male with CAD, hypertension, hyperlipidemia, asthma, anxiety/depression, diabetes with history of gastroparesis, fibromyalgia, GERD, irritable bowel syndrome, AMOS (on CPAP), CKD3, also with history of pancreatitis (Trulicity), and gastric sleeve in  who presented for intermittent mild thrombocytopenia.    -2024 admitted for CVA (left hemiplegia): got it all back, was at [facility]; got tPA, put on ASA/plavix  -6/10/2024 ([facility] labs) CBC showed WBC 6.1 (normal differential), hemoglobin 13.9/hematocrit 42.6 with normal indices, plts 136K (normal for that lab 140-400), vitamin B12 746 pg/mL, TSH 3.09 free T3 =3.1 Free T4 =1.1 (thyroid test within normal limits) vitamin D 42 (optimal) hemoglobin A1c 6.0    -2024 Dr Dougherty repeat EGD showed: empiric dilation, and a single 25 mm nonbleeding gastric polyp piece-mealed out    -2024 CBC (per GI lab results) showed WBC 8.3 (normal differential), hemoglobin 13.6/hematocrit 41.7 with normal indices and platelets of 147K  -2024 patient was admitted to [facility] for GI bleed and abdominal pain suspected from ischemic colitis, bowel purge was given recommend patient have outpatient colonoscopy.  -2024 CT stone protocol showed normal liver, \"normal\" spleen, no mesenteric or retroperitoneal lymphadenopathy, no renal stones or hydronephrosis, no acute findings.    -2024 follows up with gastroenterology [facility] and has had repeat CT abdomen pelvis is (mild splenomegaly since at least 2019), and EGDs from Dr. Neff, Dr. Ho, Dr. Whiting in, and Dr. Stewart which is shown gastric " polyps in 2019, 2020, 12/2023, gastric biopsies negative for H. pylori, empiric dilations    Subjective:  -11/11/2024 Patient presents for initial consultation reporting bleeding/bruising. She denies fatigue, unintentional weight loss, fevers, chills, drenching night sweats, lymphadenopathy.  -had a gastric sleeve 2022 - lost 60 pounds, now stable the last 8 mo. Recently put on QULIPTA for headaches by Neurologist 10/2024.   No longer on plavix from the CVA, just ASA (but off for the last month 2/2 carpal tunnel and trigger finger surgery.      Past Medical History:   Diagnosis Date    Anxiety     Asthma     CAD (coronary artery disease)     Depression     Diabetes     MAX (dyspnea on exertion)     Fibromyalgia     GERD (gastroesophageal reflux disease)     HTN (hypertension)     Hyperlipemia     IBS (irritable bowel syndrome)     Plantar fasciitis     Sleep apnea     cpap       Past Surgical History:   Procedure Laterality Date    BREAST SURGERY Bilateral     2015, 2020    CARDIAC CATHETERIZATION      COLONOSCOPY  2017    COLONOSCOPY W/ BIOPSIES AND POLYPECTOMY  06/15/2022    5 polyps removed    ENDOSCOPY  2021    GALLBLADDER SURGERY  2010    GASTRIC SLEEVE LAPAROSCOPIC N/A 10/19/2022    Procedure: GASTRIC SLEEVE LAPAROSCOPIC WITH [employer] ROBOT;  Surgeon: Gloria Marinelli MD;  Location: [facility];  Service: Robotics - DaVinci;  Laterality: N/A;    HYSTERECTOMY  2013    KNEE SURGERY Right 2017    LAPAROSCOPIC TUBAL LIGATION  2000    TONSILLECTOMY  1976         Current Outpatient Medications:     albuterol sulfate  (90 Base) MCG/ACT inhaler, Inhale 2 puffs Every 4 (Four) Hours As Needed., Disp: , Rfl:     amitriptyline (ELAVIL) 25 MG tablet, Take 1 tablet by mouth Every Night., Disp: , Rfl:     aspirin 81 MG EC tablet, Take 1 tablet by mouth Daily., Disp: , Rfl:     B-D UF III MINI PEN NEEDLES 31G X 5 MM misc, USE AS DIRECTED TWICE DAILY FOR INSULIN INJECTIONS, Disp: , Rfl:     busPIRone (BUSPAR) 15 MG  tablet, Take 1 tablet by mouth As Needed (anxiety)., Disp: , Rfl:     Cariprazine HCl (VRAYLAR) 1.5 MG capsule capsule, Take 1 capsule by mouth Every Night., Disp: , Rfl:     Continuous Blood Gluc Sensor (Dexcom G6 Sensor), Every 10 (Ten) Days., Disp: , Rfl:     Dapagliflozin Propanediol (Farxiga) 10 MG tablet, Take 10 mg by mouth Every Night., Disp: , Rfl:     doxepin (SINEquan) 25 MG capsule, Take 1 capsule by mouth Every Night., Disp: , Rfl:     Evolocumab (REPATHA) solution auto-injector SureClick injection, Inject 1 mL under the skin into the appropriate area as directed Every 14 (Fourteen) Days., Disp: 1 mL, Rfl: 3    fenofibrate (TRICOR) 145 MG tablet, Take 1 tablet by mouth Daily., Disp: , Rfl:     Fluticasone-Umeclidin-Vilant (Trelegy Ellipta) 100-62.5-25 MCG/INH inhaler, Trelegy Ellipta 100 mcg-62.5 mcg-25 mcg powder for inhalation  INHALE 1 PUFF BY MOUTH EVERY DAY, Disp: , Rfl:     gabapentin (NEURONTIN) 600 MG tablet, Take 1 tablet by mouth 3 (Three) Times a Day As Needed., Disp: , Rfl:     glucose blood test strip, Accu-Chek Guide test strips, Disp: , Rfl:     HYDROcodone-acetaminophen (NORCO)  MG per tablet, Take 1 tablet by mouth Every 8 (Eight) Hours As Needed (chronic back pain)., Disp: , Rfl:     Insulin Pen Needle (GNP UltiCare Pen Needles) 32G X 4 MM misc, 32g 5/32, Disp: , Rfl:     Insulin Regular Human ([facility]UMULIN R [facility]), 150 Units. 75 am and  75 pm, Disp: , Rfl:     losartan (COZAAR) 25 MG tablet, Take 4 tablets by mouth Daily., Disp: , Rfl:     nystatin (MYCOSTATIN) 030376 UNIT/GM powder, APPLY TO THE AFFECTED AREA(S) BY TOPICAL ROUTE 2 TIMES PER DAY, Disp: , Rfl:     ondansetron ODT (ZOFRAN-ODT) 8 MG disintegrating tablet, DISSOLVE 1 TABLET IN MOUTH EVERY EIGHT HOURS AS NEEDED FOR NAUSEA AND VOMITING, Disp: 30 tablet, Rfl: 0    pantoprazole (PROTONIX) 40 MG EC tablet, Take 1 tablet by mouth twice a day for 30 days, Disp: , Rfl:     propranolol (INDERAL) 80 MG tablet, Take 1  tablet by mouth Daily., Disp: , Rfl:     rOPINIRole (REQUIP) 2 MG tablet, Take 1 tablet by mouth Every Night., Disp: , Rfl:     sertraline (ZOLOFT) 100 MG tablet, Take 1 tablet by mouth 2 (Two) Times a Day., Disp: , Rfl:     Tirzepatide (Mounjaro) 10 MG/0.5ML solution pen-injector pen, Inject 10 mg every week by subcutaneous route., Disp: , Rfl:     TiZANidine (ZANAFLEX) 4 MG capsule, Take 1 capsule by mouth 3 (Three) Times a Day As Needed for Muscle Spasms., Disp: , Rfl:     vitamin D (ERGOCALCIFEROL) 1.25 MG (55088 UT) capsule capsule, Take 1 capsule by mouth 1 (One) Time Per Week. Wednesdays, Disp: , Rfl:     Atogepant (Qulipta) 60 MG tablet, Take 1 tablet by mouth Daily., Disp: 30 tablet, Rfl: 5    Continuous Blood Gluc Sensor ([employer] Sushma 14 Day Sensor) misc, replace 1 sensor every 14 days (Patient not taking: Reported on 11/12/2024), Disp: , Rfl:     galcanezumab-gnlm (Emgality) 120 MG/ML auto-injector pen, Inject 1 mL every 4 weeks by subcutaneous route. (Patient not taking: Reported on 11/12/2024), Disp: , Rfl:     Allergies   Allergen Reactions    Trulicity [Dulaglutide] Other (See Comments)     Caused pancreatitis    Penicillins Rash and Unknown - High Severity     Childhood Rx       Family History   Problem Relation Age of Onset    Cancer Mother 60        Breast    COPD Father 64    Heart disease Father     Obesity Maternal Grandmother        Cancer-related family history includes Cancer (age of onset: 60) in her mother.      Social History     Tobacco Use    Smoking status: Never    Smokeless tobacco: Never   Vaping Use    Vaping status: Never Used   Substance Use Topics    Alcohol use: Never    Drug use: Never     Social History     Social History Narrative    Not on file       Review of Systems:  Constitutional: +fatigue, Denies any weakness, lack of appetite, excessive appetite, weight change, chills or fever.  Eyes: Reports no blurry vision, no double vision, no pain in the eyes, dry eyes or  "tearing.  ENT: Reports no decreased hearing capacity, ear pain or tinnitus. Denies any epistaxis, nasal congestion, mouth sores or bleeding, tooth or jaw pain, sore throat or hoarseness.  Respiratory: Denies any cough, sputum production or hemoptysis. There is no wheezing, shortness of breath or any other respiratory complaints.  Cardiovascular: Denies any chest pain, shortness of breath when lying down, shortness of breath with activity, palpitations, or leg swelling.  Breasts: Denies any lumps, bumps, pain, nipple changes or discharge in the breasts.  Gastrointestinal: There are no complaints of abdominal pain, difficulty swallowing or painful swallowing, anorexia, nausea, vomiting, diarrhea, constipation, heartburn, blood in the stools, dark stools, or change in bowel habits or hemorrhoids.  Genitourinary: Denies any pain or burning on urination, blood in the urine or frequent urination. Musculoskeletal: Denies painful joints, no swelling of the joints, muscle or back pain. Denies any pain or tingling in the legs, hands or feet.  Neuropsychiatric: Denies any nervousness, depressed mood, headaches, blackouts, dizziness, weakness of limbs, loss of sensation, loss of balance, loss of coordination or difficulty in speaking.  Cutaneous: Denies any dry skin, itching, rash, change in the color of the skin, or any other cutaneous complaints.  Hematologic/Lymphatic: Denies any bruising or bleeding. Report no recent development of palpable or painful lymph nodes.  Allergy/Immunology: Denies rash/hayfever symptoms or any recent history of pneumonia, recurrent sinusitis, urinary infection or any other history of an ongoing infection.  Endocrine: Reports no intolerance to heat or cold, excessive thirst or excessive urination.      Objective:    Vital Signs:  Vitals:    11/12/24 1451   BP: 118/77   Pulse: 68   Temp: 97.9 °F (36.6 °C)   TempSrc: Oral   SpO2: 97%   Weight: 107 kg (235 lb 6.4 oz)   Height: 154.9 cm (60.98\") "   PainSc: 0-No pain     Body mass index is 44.5 kg/m².      Physical Exam:   ECO  GENERAL: The patient is a pleasant middle aged obese white female who appears their stated age and is awake, in no acute distress, alert and oriented x3.  SKIN: No rash or ulcers. Subcutaneous with no palpable nodules. Multiple tattoos on arms.  HEME/LYMPHATICS: No bruising or petechiae on visual inspection. No lymphadenopathy on visual inspection and palpation of cervical, supraclavicular, infraclavicular lymph nodes.  HEAD/FACE: atraumatic and normocephalic. Normal hair for age.  EYES: PERRLA/EOMI. No scleral icterus. No tearing or dry eyes.  ENT: External ears normal with no evidence of discharge. No evidence of ulceration or bleeding in the nostrils. Mouth has no ulcers, bleeding or inflammation of the gums, floor or roof of the mouth with normal dentition for age.  NECK: Supple, symmetric. No pain on palpation or mobilization of the neck.  CHEST/RESPIRATORY: Expansion maintained bilaterally and symmetrically. On auscultation, clear breath sounds in both lungs. No wheezes, rhonchi or rales.  BREAST: Deferred.  CARDIOVASCULAR: On auscultation, regular rate and rhythm. No murmurs, gallops or rubs are heard.  GASTROINTESTINAL/ABDOMEN: Symmetric. On auscultation of the abdomen, there are normal bowel sounds in all four quadrants. Non-tender to palpation. No palpable organomegaly (liver or spleen) or masses.  GENITOURINARY: Deferred.  NEUROLOGIC: Alert and oriented time, person, and place, with no apparent changes in recent or remote memory. Cranial nerves II-XII are grossly intact. Sensation is maintained in the extremities. Strength and tone appear normal for age and equal in the extremities. Gait is normal.  MUSCULOSKELETAL: No cyanosis, clubbing or edema in the extremities. There are no surgical scars on the extremities.  PSYCHIATRIC: The patient maintains judgment and has good insight. The patient has no changes in mood or  "affection.    Lab Results - Last 18 Months   Lab Units 11/12/24  1611   WBC 10*3/mm3 7.57   HEMOGLOBIN g/dL 13.8   HEMATOCRIT % 43.1   PLATELETS 10*3/mm3 152   MCV fL 90.4     No results for input(s): \"NA\", \"K\", \"CL\", \"CO2\", \"BUN\", \"CREATININE\", \"CALCIUM\", \"BILITOT\", \"ALKPHOS\", \"ALT\", \"AST\", \"GLUCOSE\" in the last 63216 hours.    Invalid input(s): \"LABALBU\", \"PROT\"    Lab Results   Component Value Date    GLUCOSE 230 (H) 12/26/2022    BUN 24 (H) 12/26/2022    CREATININE 1.73 (H) 12/26/2022    BCR 13.9 12/26/2022    K 4.1 12/26/2022    CO2 23.0 12/26/2022    CALCIUM 9.8 12/26/2022    ALBUMIN 4.1 12/26/2022    LABIL2 1.2 05/26/2017    AST 17 12/26/2022    ALT 17 12/26/2022       Lab Results - Last 18 Months   Lab Units 05/15/24  1143   INR  0.91   APTT Second 32.5       Lab Results   Component Value Date    IRON 73 12/26/2022       No results found for: \"FOLATE\"    No results found for: \"OCCULTBLD\"    No results found for: \"RETICCTPCT\"  No results found for: \"GQFHSKXM16\"  No results found for: \"SPEP\", \"UPEP\"  No results found for: \"LDH\", \"URICACID\"  No results found for: \"LACEY\", \"RF\", \"SEDRATE\"  No results found for: \"FIBRINOGEN\", \"HAPTOGLOBIN\"  Lab Results   Component Value Date    PTT 32.5 05/15/2024    INR 0.91 05/15/2024     No results found for: \"\"  No results found for: \"CEA\"  No components found for: \"CA-19-9\"  No results found for: \"PSA\"  No results found for: \"SEDRATE\"       Assessment & Plan     54-year-old white female with history of diabetes complicated by gastroparesis, hypertension, hyperlipidemia, obesity status post gastric sleeve with GI bleed (ischemic colitis 8/2024) presents for chronic intermittent mild thrombocytopenia.    1. chronic intermittent mild thrombocytopenia: noted in charts back to at least 5/2017. Initial differential diagnosis production issues (decreased production of bone marrow, if liver damage causing decreased TPO production, may be a lack of TPO), consumption (due to a slow " bleed or chronic infection, myelodysplasia), or sequestration (splenomegaly), pseudothrombocytopenia.    -Unlikely to be drug induced thrombocytopenia or acute thrombocytopenic events such as TTP/HUS/ITP as those thrombocytopenias are abrupt and severe, usually below 20K.    Med that can cause thrombocytopenia that she has been on: antiplatelet medications (was on plavix for 30 days), over-the-counter medications (acetaminophen, -on [place] for years).    CBC differential, peripheral smear to look for platelet clumping if concerns for pseudothrombocytopenia  -ultrasound of abdomen for evaluation of liver size and echogenicity and spleen size.    RTC in approx 1 mo to review results.      Thank you very much for providing the opportunity to participate in this patient’s care. Please do not hesitate to call if there are any other questions.

## 2024-11-12 ENCOUNTER — CONSULT (OUTPATIENT)
Dept: ONCOLOGY | Facility: CLINIC | Age: 54
End: 2024-11-12
Payer: OTHER GOVERNMENT

## 2024-11-12 ENCOUNTER — LAB (OUTPATIENT)
Dept: LAB | Facility: HOSPITAL | Age: 54
End: 2024-11-12
Payer: OTHER GOVERNMENT

## 2024-11-12 VITALS
DIASTOLIC BLOOD PRESSURE: 77 MMHG | HEART RATE: 68 BPM | HEIGHT: 61 IN | BODY MASS INDEX: 44.45 KG/M2 | OXYGEN SATURATION: 97 % | WEIGHT: 235.4 LBS | SYSTOLIC BLOOD PRESSURE: 118 MMHG | TEMPERATURE: 97.9 F

## 2024-11-12 DIAGNOSIS — D69.6 THROMBOCYTOPENIA: ICD-10-CM

## 2024-11-12 DIAGNOSIS — D69.6 THROMBOCYTOPENIA: Primary | ICD-10-CM

## 2024-11-12 LAB
BASOPHILS # BLD AUTO: 0.03 10*3/MM3 (ref 0–0.2)
BASOPHILS NFR BLD AUTO: 0.4 % (ref 0–1.5)
DEPRECATED RDW RBC AUTO: 44.5 FL (ref 37–54)
EOSINOPHIL # BLD AUTO: 0.2 10*3/MM3 (ref 0–0.4)
EOSINOPHIL NFR BLD AUTO: 2.6 % (ref 0.3–6.2)
ERYTHROCYTE [DISTWIDTH] IN BLOOD BY AUTOMATED COUNT: 13.7 % (ref 12.3–15.4)
HCT VFR BLD AUTO: 43.1 % (ref 34–46.6)
HGB BLD-MCNC: 13.8 G/DL (ref 12–15.9)
LYMPHOCYTES # BLD AUTO: 3.16 10*3/MM3 (ref 0.7–3.1)
LYMPHOCYTES NFR BLD AUTO: 41.7 % (ref 19.6–45.3)
MCH RBC QN AUTO: 28.9 PG (ref 26.6–33)
MCHC RBC AUTO-ENTMCNC: 32 G/DL (ref 31.5–35.7)
MCV RBC AUTO: 90.4 FL (ref 79–97)
MONOCYTES # BLD AUTO: 0.56 10*3/MM3 (ref 0.1–0.9)
MONOCYTES NFR BLD AUTO: 7.4 % (ref 5–12)
NEUTROPHILS NFR BLD AUTO: 3.62 10*3/MM3 (ref 1.7–7)
NEUTROPHILS NFR BLD AUTO: 47.9 % (ref 42.7–76)
PATHOLOGY REVIEW: YES
PLATELET # BLD AUTO: 152 10*3/MM3 (ref 140–450)
PMV BLD AUTO: 9.7 FL (ref 6–12)
RBC # BLD AUTO: 4.77 10*6/MM3 (ref 3.77–5.28)
WBC NRBC COR # BLD AUTO: 7.57 10*3/MM3 (ref 3.4–10.8)

## 2024-11-12 PROCEDURE — 36415 COLL VENOUS BLD VENIPUNCTURE: CPT | Performed by: INTERNAL MEDICINE

## 2024-11-12 PROCEDURE — 85025 COMPLETE CBC W/AUTO DIFF WBC: CPT | Performed by: INTERNAL MEDICINE

## 2024-11-13 LAB
LAB AP CASE REPORT: NORMAL
PATH REPORT.FINAL DX SPEC: NORMAL

## 2024-11-14 ENCOUNTER — PATIENT ROUNDING (BHMG ONLY) (OUTPATIENT)
Dept: ONCOLOGY | Facility: CLINIC | Age: 54
End: 2024-11-14
Payer: OTHER GOVERNMENT

## 2024-11-14 NOTE — PROGRESS NOTES
November 14, 2024    Hello, may I speak with Gissel Amaro?    My name is Stacia Pal      I am  with [facility]  [address] [facility] [place] [address] [phone].    Before we get started may I verify your date of birth? 1970    I am calling to officially welcome you to our practice and ask about your recent visit. Is this a good time to talk? no    Tell me about your visit with us. What things went well?  A My Chart message was sent to the patient.         We're always looking for ways to make our patients' experiences even better. Do you have recommendations on ways we may improve?  no    Overall were you satisfied with your first visit to our practice? yes       I appreciate you taking the time to speak with me today. Is there anything else I can do for you? no      Thank you, and have a great day.

## 2024-12-02 DIAGNOSIS — D69.6 THROMBOCYTOPENIA: Primary | ICD-10-CM

## 2024-12-09 ENCOUNTER — HOSPITAL ENCOUNTER (OUTPATIENT)
Dept: ULTRASOUND IMAGING | Facility: HOSPITAL | Age: 54
Discharge: HOME | End: 2024-12-09
Admitting: INTERNAL MEDICINE
Payer: OTHER GOVERNMENT

## 2024-12-09 DIAGNOSIS — G43.119 INTRACTABLE MIGRAINE WITH AURA WITHOUT STATUS MIGRAINOSUS: Primary | ICD-10-CM

## 2024-12-09 DIAGNOSIS — D69.6 THROMBOCYTOPENIA: ICD-10-CM

## 2024-12-09 PROCEDURE — 76705 ECHO EXAM OF ABDOMEN: CPT

## 2024-12-09 RX ORDER — RIMEGEPANT SULFATE 75 MG/75MG
75 TABLET, ORALLY DISINTEGRATING ORAL AS NEEDED
Qty: 8 TABLET | Refills: 5 | Status: SHIPPED | OUTPATIENT
Start: 2024-12-09

## 2024-12-11 NOTE — PROGRESS NOTES
"  HEMATOLOGY ONCOLOGY OUTPATIENT FOLLOW UP       Patient name: Gissel Amaro  : 1970  MRN: [id number]  Primary Care Physician: Sony Wong MD  Referring Physician: Sony Wong*  Reason For Consult: thrombocytopenia    History of Present Illness:  Patient is a 54 y.o. male with CAD, hypertension, hyperlipidemia, asthma, anxiety/depression, diabetes with history of gastroparesis, fibromyalgia, GERD, irritable bowel syndrome, AMOS (on CPAP), CKD3, also with history of pancreatitis (Trulicity), and gastric sleeve in  who presented for intermittent mild thrombocytopenia.    -2024 admitted for CVA (left hemiplegia): got it all back, was at [facility]; got tPA, put on ASA/plavix  -6/10/2024 ([facility] consultants labs) CBC showed WBC 6.1 (normal differential), hemoglobin 13.9/hematocrit 42.6 with normal indices, plts 136K (normal for that lab 140-400), vitamin B12 746 pg/mL, TSH 3.09 free T3 =3.1 Free T4 =1.1 (thyroid test within normal limits) vitamin D 42 (optimal) hemoglobin A1c 6.0    -2024 Dr Dougherty repeat EGD showed: empiric dilation, and a single 25 mm nonbleeding gastric polyp piece-mealed out    -2024 CBC (per GI lab results) showed WBC 8.3 (normal differential), hemoglobin 13.6/hematocrit 41.7 with normal indices and platelets of 147K  -2024 patient was admitted to [facility] for GI bleed and abdominal pain suspected from ischemic colitis, bowel purge was given recommend patient have outpatient colonoscopy.  -2024 CT stone protocol showed normal liver, \"normal\" spleen, no mesenteric or retroperitoneal lymphadenopathy, no renal stones or hydronephrosis, no acute findings.    -2024 follows up with gastroenterology [facility] and has had repeat CT abdomen pelvis is (mild splenomegaly since at least 2019), and EGDs from Dr. Neff, Dr. Ho, Dr. Whiting in, and Dr. Stewart which is shown gastric polyps in , , " 12/2023, gastric biopsies negative for H. pylori, empiric dilations    -11/11/2024 Patient presents for initial consultation reporting bleeding/bruising. She denies fatigue, unintentional weight loss, fevers, chills, drenching night sweats, lymphadenopathy.  -had a gastric sleeve 2022 - lost 60 pounds, now stable the last 8 mo. Recently put on QULIPTA for headaches by Neurologist 10/2024.   No longer on plavix from the CVA, just ASA (but off for the last month 2/2 carpal tunnel and trigger finger surgery.      Subjective:  -11/12/2024 CBC showed WBC 7.57 (normal differential) with hemoglobin 13.8/hematocrit 43.1 with normal indices and platelets of 152. Tablets with peripheral blood smear showing unremarkable peripheral blood smear with no blasts identified    -12/9/2024 ultrasound of the abdomen showed: Liver measures up to 18 cm in size and is increased in echogenicity. No focal lesion or intraparotid biliary ductal dilation, s/p cecily. Spleen is mildly enlarged measuring 14.0 x 5.9 x 14.2 cm. Spleen volume 620 mL. Spleen demonstrates no acute abnormality on limited imaging. No evidence of ascites.   IMPRESSION: Increased echogenicity liver compatible diffuse hepatocellular disease, hepatic steatosis. Please correlate with liver function tests. Mild splenomegaly    -12/16/2024 Patient presents for follow up reporting bleeding/bruising-no bleeding or bruising. She continues to deny fatigue, unintentional weight loss, fevers, chills, drenching night sweats, lymphadenopathy.      Past Medical History:   Diagnosis Date    Anxiety     Asthma     CAD (coronary artery disease)     Depression     Diabetes     MAX (dyspnea on exertion)     Fibromyalgia     GERD (gastroesophageal reflux disease)     HTN (hypertension)     Hyperlipemia     IBS (irritable bowel syndrome)     Plantar fasciitis     Sleep apnea     cpap       Past Surgical History:   Procedure Laterality Date    BREAST SURGERY Bilateral     2015, 2020    CARDIAC  CATHETERIZATION      COLONOSCOPY  2017    COLONOSCOPY W/ BIOPSIES AND POLYPECTOMY  06/15/2022    5 polyps removed    ENDOSCOPY  2021    GALLBLADDER SURGERY  2010    GASTRIC SLEEVE LAPAROSCOPIC N/A 10/19/2022    Procedure: GASTRIC SLEEVE LAPAROSCOPIC WITH [employer] ROBOT;  Surgeon: Gloria Marinelli MD;  Location: [facility] MAIN OR;  Service: Robotics - [employer];  Laterality: N/A;    HYSTERECTOMY  2013    KNEE SURGERY Right 2017    LAPAROSCOPIC TUBAL LIGATION  2000    TONSILLECTOMY  1976         Current Outpatient Medications:     albuterol sulfate  (90 Base) MCG/ACT inhaler, Inhale 2 puffs Every 4 (Four) Hours As Needed., Disp: , Rfl:     amitriptyline (ELAVIL) 25 MG tablet, Take 1 tablet by mouth Every Night., Disp: , Rfl:     Atogepant (Qulipta) 60 MG tablet, Take 1 tablet by mouth Daily., Disp: 30 tablet, Rfl: 5    azithromycin (ZITHROMAX) 250 MG tablet, TAKE 2 TABLETS (500 MG) BY ORAL ROUTE ONCE DAILY FOR 1 DAY THEN 1 TABLET (250 MG) BY ORAL ROUTE ONCE DAILY FOR 4 DAYS, Disp: , Rfl:     B-D UF III MINI PEN NEEDLES 31G X 5 MM misc, USE AS DIRECTED TWICE DAILY FOR INSULIN INJECTIONS, Disp: , Rfl:     busPIRone (BUSPAR) 15 MG tablet, Take 1 tablet by mouth As Needed (anxiety)., Disp: , Rfl:     Cariprazine HCl (VRAYLAR) 1.5 MG capsule capsule, Take 1 capsule by mouth Every Night., Disp: , Rfl:     Continuous Blood Gluc Sensor (Dexcom G6 Sensor), Every 10 (Ten) Days., Disp: , Rfl:     Dapagliflozin Propanediol (Farxiga) 10 MG tablet, Take 10 mg by mouth Every Night., Disp: , Rfl:     doxepin (SINEquan) 25 MG capsule, Take 1 capsule by mouth Every Night., Disp: , Rfl:     Evolocumab (REPATHA) solution auto-injector SureClick injection, Inject 1 mL under the skin into the appropriate area as directed Every 14 (Fourteen) Days., Disp: 1 mL, Rfl: 3    fenofibrate (TRICOR) 145 MG tablet, Take 1 tablet by mouth Daily., Disp: , Rfl:     Fluticasone-Umeclidin-Vilant (Trelegy Ellipta) 100-62.5-25 MCG/INH inhaler, Trelegy Ellipta  100 mcg-62.5 mcg-25 mcg powder for inhalation  INHALE 1 PUFF BY MOUTH EVERY DAY, Disp: , Rfl:     gabapentin (NEURONTIN) 600 MG tablet, Take 1 tablet by mouth 3 (Three) Times a Day As Needed., Disp: , Rfl:     glucose blood test strip, Accu-Chek Guide test strips, Disp: , Rfl:     HYDROcodone-acetaminophen (NORCO)  MG per tablet, Take 1 tablet by mouth Every 8 (Eight) Hours As Needed (chronic back pain)., Disp: , Rfl:     Insulin Pen Needle (GNP UltiCare Pen Needles) 32G X 4 MM misc, 32g 5/32, Disp: , Rfl:     losartan (COZAAR) 25 MG tablet, Take 4 tablets by mouth Daily., Disp: , Rfl:     nystatin (MYCOSTATIN) 468113 UNIT/GM powder, APPLY TO THE AFFECTED AREA(S) BY TOPICAL ROUTE 2 TIMES PER DAY, Disp: , Rfl:     ondansetron ODT (ZOFRAN-ODT) 8 MG disintegrating tablet, DISSOLVE 1 TABLET IN MOUTH EVERY EIGHT HOURS AS NEEDED FOR NAUSEA AND VOMITING, Disp: 30 tablet, Rfl: 0    pantoprazole (PROTONIX) 40 MG EC tablet, Take 1 tablet by mouth twice a day for 30 days, Disp: , Rfl:     propranolol (INDERAL) 80 MG tablet, Take 1 tablet by mouth Daily., Disp: , Rfl:     Rimegepant Sulfate (Nurtec) 75 MG tablet dispersible tablet, Take 1 tablet by mouth As Needed (migraine)., Disp: 8 tablet, Rfl: 5    rOPINIRole (REQUIP) 2 MG tablet, Take 1 tablet by mouth Every Night., Disp: , Rfl:     sertraline (ZOLOFT) 100 MG tablet, Take 1 tablet by mouth 2 (Two) Times a Day., Disp: , Rfl:     Tirzepatide (Mounjaro) 10 MG/0.5ML solution pen-injector pen, Inject 10 mg every week by subcutaneous route., Disp: , Rfl:     TiZANidine (ZANAFLEX) 4 MG capsule, Take 1 capsule by mouth 3 (Three) Times a Day As Needed for Muscle Spasms., Disp: , Rfl:     vitamin D (ERGOCALCIFEROL) 1.25 MG (58441 UT) capsule capsule, Take 1 capsule by mouth 1 (One) Time Per Week. Wednesdays, Disp: , Rfl:     aspirin 81 MG EC tablet, Take 1 tablet by mouth Daily. (Patient not taking: Reported on 12/16/2024), Disp: , Rfl:     Continuous Blood Gluc Sensor  (FreeStyle Sushma 14 Day Sensor) misc, replace 1 sensor every 14 days (Patient not taking: Reported on 12/16/2024), Disp: , Rfl:     Insulin Regular Human (HUMULIN R U-500 KWIKPEN SC), 150 Units. 75 am and  75 pm (Patient not taking: Reported on 12/16/2024), Disp: , Rfl:     Allergies   Allergen Reactions    Trulicity [Dulaglutide] Other (See Comments)     Caused pancreatitis    Penicillins Rash and Unknown - High Severity     Childhood Rx       Family History   Problem Relation Age of Onset    Cancer Mother 60        Breast    COPD Father 64    Heart disease Father     Obesity Maternal Grandmother        Cancer-related family history includes Cancer (age of onset: 60) in her mother.    Social History     Tobacco Use    Smoking status: Never    Smokeless tobacco: Never   Vaping Use    Vaping status: Never Used   Substance Use Topics    Alcohol use: Never    Drug use: Never     Social History     Social History Narrative    Not on file      Review of Systems:  Constitutional: +fatigue, Denies any weakness, lack of appetite, excessive appetite, weight change, chills or fever.  Eyes: Reports no blurry vision, no double vision, no pain in the eyes, dry eyes or tearing.  ENT: Reports no decreased hearing capacity, ear pain or tinnitus. Denies any epistaxis, nasal congestion, mouth sores or bleeding, tooth or jaw pain, sore throat or hoarseness.  Respiratory: Denies any cough, sputum production or hemoptysis. There is no wheezing, shortness of breath or any other respiratory complaints.  Cardiovascular: Denies any chest pain, shortness of breath when lying down, shortness of breath with activity, palpitations, or leg swelling.  Breasts: Denies any lumps, bumps, pain, nipple changes or discharge in the breasts.  Gastrointestinal: There are no complaints of abdominal pain, difficulty swallowing or painful swallowing, anorexia, nausea, vomiting, diarrhea, constipation, heartburn, blood in the stools, dark stools, or change in  "bowel habits or hemorrhoids.  Genitourinary: Denies any pain or burning on urination, blood in the urine or frequent urination. Musculoskeletal: Denies painful joints, no swelling of the joints, muscle or back pain. Denies any pain or tingling in the legs, hands or feet.  Neuropsychiatric: Denies any nervousness, depressed mood, headaches, blackouts, dizziness, weakness of limbs, loss of sensation, loss of balance, loss of coordination or difficulty in speaking.  Cutaneous: Denies any dry skin, itching, rash, change in the color of the skin, or any other cutaneous complaints.  Hematologic/Lymphatic: Denies any bruising or bleeding. Report no recent development of palpable or painful lymph nodes.  Allergy/Immunology: Denies rash/hayfever symptoms or any recent history of pneumonia, recurrent sinusitis, urinary infection or any other history of an ongoing infection.  Endocrine: Reports no intolerance to heat or cold, excessive thirst or excessive urination.    Objective:  Vital signs:  Vitals:    24 1417   BP: 111/79   Pulse: 66   Temp: 98.6 °F (37 °C)   TempSrc: Oral   SpO2: 94%   Weight: 107 kg (234 lb 12.8 oz)   Height: 154.9 cm (60.98\")   PainSc: 0-No pain     Body mass index is 44.39 kg/m².      Physical Exam:   ECO  GENERAL: The patient is a pleasant middle aged obese white female who appears their stated age and is awake, in no acute distress, alert and oriented x3.  SKIN: No rash or ulcers. Multiple tattoos on arms. Right lateral upper thigh small bullous blister and redness < 1.5 cm.  HEME/LYMPHATICS: No bruising or petechiae on visual inspection. No lymphadenopathy on visual inspection of cervical, supraclavicular, infraclavicular lymph nodes.  HEAD/FACE: atraumatic and normocephalic. Normal hair for age.  EYES: PERRLA/EOMI. No scleral icterus. No tearing or dry eyes.  ENT: External ears normal with no evidence of discharge. No evidence of ulceration or bleeding in the nostrils. Mouth has no " "ulcers, bleeding or inflammation of the gums, floor or roof of the mouth with normal dentition for age.  NECK: Supple, symmetric.   CHEST/RESPIRATORY: Expansion maintained bilaterally and symmetrically. On auscultation, clear breath sounds in both lungs. No wheezes, rhonchi or rales.  BREAST: Deferred.  CARDIOVASCULAR: On auscultation, regular rate and rhythm. No murmurs, gallops or rubs are heard.  GASTROINTESTINAL/ABDOMEN: Symmetric. On auscultation of the abdomen, there are normal bowel sounds in all four quadrants. Non-tender to palpation. No palpable organomegaly (liver or spleen) or masses.  GENITOURINARY: Deferred.  NEUROLOGIC: Alert and oriented time, person, and place, with no apparent changes in recent or remote memory. Cranial nerves II-XII are grossly intact. Sensation is maintained in the extremities. Strength and tone appear normal for age and equal in the extremities. Gait is normal.  MUSCULOSKELETAL: No cyanosis, clubbing or edema in the extremities. There are no surgical scars on the extremities.  PSYCHIATRIC: The patient maintains judgment and has good insight. The patient has no changes in mood or affection.    Lab Results - Last 18 Months   Lab Units 11/12/24  1611   WBC 10*3/mm3 7.57   HEMOGLOBIN g/dL 13.8   HEMATOCRIT % 43.1   PLATELETS 10*3/mm3 152   MCV fL 90.4       Lab Results   Component Value Date    GLUCOSE 230 (H) 12/26/2022    BUN 24 (H) 12/26/2022    CREATININE 1.73 (H) 12/26/2022    BCR 13.9 12/26/2022    K 4.1 12/26/2022    CO2 23.0 12/26/2022    CALCIUM 9.8 12/26/2022    ALBUMIN 4.1 12/26/2022    LABIL2 1.2 05/26/2017    AST 17 12/26/2022    ALT 17 12/26/2022       Lab Results - Last 18 Months   Lab Units 05/15/24  1143   INR  0.91   APTT Second 32.5       Lab Results   Component Value Date    IRON 73 12/26/2022       Lab Results   Component Value Date    PTT 32.5 05/15/2024    INR 0.91 05/15/2024     No results found for: \"SEDRATE\"    Assessment & Plan   54-year-old white female " with history of diabetes complicated by gastroparesis, hypertension, hyperlipidemia, obesity status post gastric sleeve with GI bleed (ischemic colitis 8/2024) presents for chronic intermittent mild thrombocytopenia.    1. chronic intermittent mild thrombocytopenia: noted in charts back to at least 5/2017. Initial differential diagnosis production issues (decreased production of bone marrow, if liver damage causing decreased TPO production, may be a lack of TPO), consumption (due to a slow bleed or chronic infection, myelodysplasia), or sequestration (splenomegaly), pseudothrombocytopenia.    -Unlikely to be drug induced thrombocytopenia or acute thrombocytopenic events such as TTP/HUS/ITP as those thrombocytopenias are abrupt and severe, usually below 20K.    Med that can cause thrombocytopenia that she has been on: antiplatelet medications (was on plavix for 30 days), over-the-counter medications (acetaminophen, -on [place] for years).    CBC differential was within normal limits, peripheral smear negative for platelet clumping   -ultrasound of abdomen for evaluation showed enlarged liver with hepatosteatosis and enlarged spleen.    RTC in approx 6 mo with CBC review results. 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    2. Blister on right lateral thigh: unknown etiology  -recommend alternating warm compresses for 10 min on, 10 min off multiple times a day  -if not improving within a few days or worsening, present to UC or PCP      Thank you very much for providing the opportunity to participate in this patient’s care. Please do not hesitate to call if there are any other questions.

## 2024-12-16 ENCOUNTER — OFFICE (AMBULATORY)
Dept: URBAN - METROPOLITAN AREA CLINIC 64 | Facility: CLINIC | Age: 54
End: 2024-12-16
Payer: OTHER GOVERNMENT

## 2024-12-16 ENCOUNTER — OFFICE VISIT (OUTPATIENT)
Dept: ONCOLOGY | Facility: CLINIC | Age: 54
End: 2024-12-16
Payer: OTHER GOVERNMENT

## 2024-12-16 VITALS
DIASTOLIC BLOOD PRESSURE: 79 MMHG | WEIGHT: 234.8 LBS | BODY MASS INDEX: 44.33 KG/M2 | SYSTOLIC BLOOD PRESSURE: 111 MMHG | HEART RATE: 66 BPM | HEIGHT: 61 IN | TEMPERATURE: 98.6 F | OXYGEN SATURATION: 94 %

## 2024-12-16 VITALS
SYSTOLIC BLOOD PRESSURE: 129 MMHG | HEART RATE: 69 BPM | WEIGHT: 234 LBS | HEIGHT: 62 IN | DIASTOLIC BLOOD PRESSURE: 81 MMHG

## 2024-12-16 DIAGNOSIS — R13.10 DYSPHAGIA, UNSPECIFIED: ICD-10-CM

## 2024-12-16 DIAGNOSIS — D69.6 THROMBOCYTOPENIA: Primary | ICD-10-CM

## 2024-12-16 DIAGNOSIS — R19.7 DIARRHEA, UNSPECIFIED: ICD-10-CM

## 2024-12-16 DIAGNOSIS — D69.6 THROMBOCYTOPENIA, UNSPECIFIED: ICD-10-CM

## 2024-12-16 DIAGNOSIS — K21.9 GASTRO-ESOPHAGEAL REFLUX DISEASE WITHOUT ESOPHAGITIS: ICD-10-CM

## 2024-12-16 PROCEDURE — 99213 OFFICE O/P EST LOW 20 MIN: CPT | Performed by: INTERNAL MEDICINE

## 2024-12-16 PROCEDURE — 99213 OFFICE O/P EST LOW 20 MIN: CPT

## 2024-12-16 RX ORDER — AZITHROMYCIN 250 MG/1
TABLET, FILM COATED ORAL
COMMUNITY
Start: 2024-11-09

## 2025-01-21 ENCOUNTER — HOSPITAL ENCOUNTER (EMERGENCY)
Facility: HOSPITAL | Age: 55
Discharge: HOME | End: 2025-01-21
Attending: EMERGENCY MEDICINE | Admitting: EMERGENCY MEDICINE
Payer: OTHER GOVERNMENT

## 2025-01-21 ENCOUNTER — APPOINTMENT (OUTPATIENT)
Dept: CT IMAGING | Facility: HOSPITAL | Age: 55
End: 2025-01-21
Payer: OTHER GOVERNMENT

## 2025-01-21 VITALS
BODY MASS INDEX: 43.24 KG/M2 | RESPIRATION RATE: 16 BRPM | DIASTOLIC BLOOD PRESSURE: 55 MMHG | WEIGHT: 235 LBS | HEART RATE: 69 BPM | OXYGEN SATURATION: 96 % | SYSTOLIC BLOOD PRESSURE: 128 MMHG | TEMPERATURE: 98.1 F | HEIGHT: 62 IN

## 2025-01-21 DIAGNOSIS — R10.12 LEFT UPPER QUADRANT ABDOMINAL PAIN: Primary | ICD-10-CM

## 2025-01-21 LAB
ALBUMIN SERPL-MCNC: 4.3 G/DL (ref 3.5–5.2)
ALBUMIN/GLOB SERPL: 1.9 G/DL
ALP SERPL-CCNC: 50 U/L (ref 39–117)
ALT SERPL W P-5'-P-CCNC: 15 U/L (ref 1–33)
ANION GAP SERPL CALCULATED.3IONS-SCNC: 9.8 MMOL/L (ref 5–15)
AST SERPL-CCNC: 14 U/L (ref 1–32)
BASOPHILS # BLD AUTO: 0.03 10*3/MM3 (ref 0–0.2)
BASOPHILS NFR BLD AUTO: 0.4 % (ref 0–1.5)
BILIRUB SERPL-MCNC: 0.4 MG/DL (ref 0–1.2)
BUN SERPL-MCNC: 28 MG/DL (ref 6–20)
BUN/CREAT SERPL: 12.4 (ref 7–25)
CALCIUM SPEC-SCNC: 9.8 MG/DL (ref 8.6–10.5)
CHLORIDE SERPL-SCNC: 106 MMOL/L (ref 98–107)
CO2 SERPL-SCNC: 26.2 MMOL/L (ref 22–29)
CREAT SERPL-MCNC: 2.25 MG/DL (ref 0.57–1)
DEPRECATED RDW RBC AUTO: 46.4 FL (ref 37–54)
EGFRCR SERPLBLD CKD-EPI 2021: 25.2 ML/MIN/1.73
EOSINOPHIL # BLD AUTO: 0.11 10*3/MM3 (ref 0–0.4)
EOSINOPHIL NFR BLD AUTO: 1.5 % (ref 0.3–6.2)
ERYTHROCYTE [DISTWIDTH] IN BLOOD BY AUTOMATED COUNT: 13.8 % (ref 12.3–15.4)
GLOBULIN UR ELPH-MCNC: 2.3 GM/DL
GLUCOSE SERPL-MCNC: 175 MG/DL (ref 65–99)
HCT VFR BLD AUTO: 42.1 % (ref 34–46.6)
HGB BLD-MCNC: 13.2 G/DL (ref 12–15.9)
IMM GRANULOCYTES # BLD AUTO: 0.02 10*3/MM3 (ref 0–0.05)
IMM GRANULOCYTES NFR BLD AUTO: 0.3 % (ref 0–0.5)
LYMPHOCYTES # BLD AUTO: 3.43 10*3/MM3 (ref 0.7–3.1)
LYMPHOCYTES NFR BLD AUTO: 46.7 % (ref 19.6–45.3)
MCH RBC QN AUTO: 28.9 PG (ref 26.6–33)
MCHC RBC AUTO-ENTMCNC: 31.4 G/DL (ref 31.5–35.7)
MCV RBC AUTO: 92.1 FL (ref 79–97)
MONOCYTES # BLD AUTO: 0.6 10*3/MM3 (ref 0.1–0.9)
MONOCYTES NFR BLD AUTO: 8.2 % (ref 5–12)
NEUTROPHILS NFR BLD AUTO: 3.16 10*3/MM3 (ref 1.7–7)
NEUTROPHILS NFR BLD AUTO: 42.9 % (ref 42.7–76)
PLATELET # BLD AUTO: 155 10*3/MM3 (ref 140–450)
PMV BLD AUTO: 9.4 FL (ref 6–12)
POTASSIUM SERPL-SCNC: 4.4 MMOL/L (ref 3.5–5.2)
PROT SERPL-MCNC: 6.6 G/DL (ref 6–8.5)
QT INTERVAL: 381 MS
QTC INTERVAL: 403 MS
RBC # BLD AUTO: 4.57 10*6/MM3 (ref 3.77–5.28)
SODIUM SERPL-SCNC: 142 MMOL/L (ref 136–145)
WBC NRBC COR # BLD AUTO: 7.35 10*3/MM3 (ref 3.4–10.8)

## 2025-01-21 PROCEDURE — 85025 COMPLETE CBC W/AUTO DIFF WBC: CPT | Performed by: NURSE PRACTITIONER

## 2025-01-21 PROCEDURE — 80053 COMPREHEN METABOLIC PANEL: CPT | Performed by: NURSE PRACTITIONER

## 2025-01-21 PROCEDURE — 74176 CT ABD & PELVIS W/O CONTRAST: CPT

## 2025-01-21 PROCEDURE — 93005 ELECTROCARDIOGRAM TRACING: CPT | Performed by: EMERGENCY MEDICINE

## 2025-01-21 PROCEDURE — 99284 EMERGENCY DEPT VISIT MOD MDM: CPT

## 2025-01-21 RX ORDER — SODIUM CHLORIDE 0.9 % (FLUSH) 0.9 %
10 SYRINGE (ML) INJECTION AS NEEDED
Status: DISCONTINUED | OUTPATIENT
Start: 2025-01-21 | End: 2025-01-22 | Stop reason: HOSPADM

## 2025-01-22 NOTE — FSED PROVIDER NOTE
"Subjective   History of Present Illness  The patient is a 54-year-old female who presents to the ER with left upper quadrant pain for the past 2 days.  Patient states, \"I have an enlarged spleen that was diagnosed in December.\"  Ultrasound in December showed mild splenomegaly.        History provided by:  Patient   used: No        Review of Systems   Gastrointestinal:  Positive for abdominal pain.       Past Medical History:   Diagnosis Date    Anxiety     Asthma     CAD (coronary artery disease)     Depression     Diabetes     MAX (dyspnea on exertion)     Fibromyalgia     GERD (gastroesophageal reflux disease)     HTN (hypertension)     Hyperlipemia     IBS (irritable bowel syndrome)     Plantar fasciitis     Sleep apnea     cpap       Allergies   Allergen Reactions    Trulicity [Dulaglutide] Other (See Comments)     Caused pancreatitis    Penicillins Rash and Unknown - High Severity     Childhood Rx       Past Surgical History:   Procedure Laterality Date    BREAST SURGERY Bilateral     2015, 2020    CARDIAC CATHETERIZATION      COLONOSCOPY  2017    COLONOSCOPY W/ BIOPSIES AND POLYPECTOMY  06/15/2022    5 polyps removed    ENDOSCOPY  2021    GALLBLADDER SURGERY  2010    GASTRIC SLEEVE LAPAROSCOPIC N/A 10/19/2022    Procedure: GASTRIC SLEEVE LAPAROSCOPIC WITH DAVINCI ROBOT;  Surgeon: Gloria Marinelli MD;  Location: [facility] OR;  Service: Robotics - [employer];  Laterality: N/A;    HYSTERECTOMY  2013    KNEE SURGERY Right 2017    LAPAROSCOPIC TUBAL LIGATION  2000    TONSILLECTOMY  1976       Family History   Problem Relation Age of Onset    Cancer Mother 60        Breast    COPD Father 64    Heart disease Father     Obesity Maternal Grandmother        Social History     Socioeconomic History    Marital status:    Tobacco Use    Smoking status: Never    Smokeless tobacco: Never   Vaping Use    Vaping status: Never Used   Substance and Sexual Activity    Alcohol use: Never    Drug use: Never    " "Sexual activity: Defer           Objective   Physical Exam    Procedures           ED Course  ED Course as of 01/21/25 2354   Tue Jan 21, 2025 2256 WBC: 7.35 [DS]   2256 Hemoglobin: 13.2 [DS]   2256 Hematocrit: 42.1 [DS]   2256 Platelets: 155 [DS]   2315 Glucose(!): 175 [DS]   2315 BUN(!): 28 [DS]   2315 Creatinine(!): 2.25 [DS]      ED Course User Index  [DS] Inés Rojas APRN                                           Medical Decision Making  The patient is a 54-year-old female who presents to the ER with left upper quadrant pain for the past 2 days.  Patient states, \"I have an enlarged spleen that was diagnosed in December.\"  Ultrasound in December showed mild splenomegaly.    Patient presents with left upper quadrant pain. Reports was dx with enlarge spleen in December.  Abdominal exam without peritoneal signs. No evidence of acute abdomen at this time. Well appearing.Given work up low suspicion for acute hepatobiliary disease (including acute cholecystitis), acute pancreatitis (neg lipase), PUD and gastric perforation, acute infectious processes (pneumonia, hepatitis, pyelonephritis), acute appendicitis, vascular catastrophe, bowel obstruction or viscus perforation, diverticulitis. Presentation not consistent with other acute, emergent causes of abdominal pain at this time. Patient with hx of known CKD in the past, elevated BUN/creatinine tonight, after reviewing previous blood work in care everywhere tonight blood work is baseline for here kidney function. Advised patient to follow up with PCP.       Problems Addressed:  Left upper quadrant abdominal pain: complicated acute illness or injury    Amount and/or Complexity of Data Reviewed  Labs: ordered. Decision-making details documented in ED Course.     Details: Patient noted to have elevated BUN/Creatinine in the past. Patient with CKD.   Radiology: ordered.     Details: Patient with outpatient ultrasound US ABDOMEN LIMITED     Date of Exam: " 12/9/2024 4:16 PM EST     Indication: hx DM, lipids, interimittent LFTs, intermittent thrombocytopenia- is liver large, fatty or enlarged spleen?.     Comparison: CT abdomen pelvis 65/25/2070     Technique: Grayscale and color Doppler ultrasound evaluation of the upper abdomen was performed.           FINDINGS:     Liver: Liver measures up to 18 cm in size and is increased in echogenicity. No focal lesion or intraparotid biliary ductal dilation is noted. The visualized central portions of the hepatic and the portal vein are patent and appear to demonstrate normal   directional flow. Deep portions of the liver are somewhat difficult to visualize. No visualized lesion noted in the liver parenchyma.     Biliary System: Gallbladder surgically absent.  Common bile duct is within normal limits measuring: 3 mm     Spleen is mildly enlarged measuring 14.0 x 5.9 x 14.2 cm. Spleen volume 620 mL. Spleen demonstrates no acute abnormality on limited imaging.     Other: No evidence of ascites.        IMPRESSION:     1. Increased echogenicity liver compatible diffuse hepatocellular disease, hepatic steatosis. Please correlate with liver function tests.     2. Mild splenomegaly    Todays CT   CT ABDOMEN PELVIS WO CONTRAST     Date of Exam: 1/21/2025 11:22 PM EST     Indication: LUQ pain, hx of splenomegaly dx in december.     Comparison: None available.     Technique: Axial CT images were obtained of the abdomen and pelvis without the administration of contrast. Sagittal and coronal reconstructions were performed.  Automated exposure control and iterative reconstruction methods were used.        Findings:  Lung Bases:     The visualized lung bases and lower mediastinal structures are unremarkable.     Limited evaluation of the solid organs due to lack of intravenous contrast.  Liver:  Liver is normal in size and CT density. No focal lesions.     Biliary/Gallbladder:    The gallbladder has been resected.. The biliary tree is  nondilated.     Spleen:  The spleen appears borderline enlarged measuring up to 13.8 cm in craniocaudal dimension. This appears to have improved as compared to the previous study, previously measuring up to 15 cm.     Pancreas:    Pancreas is normal. There is no evidence of pancreatic mass or peripancreatic fluid.     Kidneys:    Kidneys are normal in size. There are no stones or hydronephrosis.     Adrenals:    Adrenal glands are unremarkable.     Retroperitoneal/Lymph Nodes/Vasculature:    No retroperitoneal adenopathy is identified.     Gastrointestinal/Mesentery:    Postsurgical changes are seen related to previous sleeve gastrectomy. No significant stool burden identified. No evidence of hernia. The bowel loops are non-dilated without wall thickening or mass. The appendix appears within normal limits. No evidence   of obstruction. No free air. No mesenteric fluid collections identified.     Bladder:    The bladder is normal.     Genital:     Unremarkable          Bony Structures:     Visualized bony structures are consistent with the patient's age.        IMPRESSION:  Impression:  1.No acute intra-abdominal or intrapelvic process.  2.Borderline splenomegaly, improved as compared to the previous study.  3.Ancillary findings as described above.            ECG/medicine tests: ordered.    Risk  OTC drugs.        Final diagnoses:   Left upper quadrant abdominal pain       ED Disposition  ED Disposition       ED Disposition   Discharge    Condition   Stable    Comment   --               Sony Wong MD  [address] [place] [address] [place] [address] [phone]    Schedule an appointment as soon as possible for a visit in 1 week  for follow up appointment         Medication List      No changes were made to your prescriptions during this visit.

## 2025-01-22 NOTE — DISCHARGE INSTRUCTIONS
Call for a follow up appointment with your primary care for further evaluation and treatment.     Your BUN today is 28. Creatinine is 2.25. we do not have any lab work since 2022 here at [facility].     Tylenol/Motrin as needed for pain/fevers    Make sure patient is drinking plenty of fluids.    Return for any new or worsening symptoms.      Voice recognition transcription technology was used for documentation on this chart.  Result there may be some typos and/or introduced into the chart that were overlooked during editing reviewing.

## 2025-01-27 ENCOUNTER — HOSPITAL ENCOUNTER (OUTPATIENT)
Dept: CARDIOLOGY | Facility: HOSPITAL | Age: 55
Discharge: HOME | End: 2025-01-27
Payer: OTHER GOVERNMENT

## 2025-01-27 ENCOUNTER — OFFICE VISIT (OUTPATIENT)
Dept: CARDIOLOGY | Facility: CLINIC | Age: 55
End: 2025-01-27
Payer: OTHER GOVERNMENT

## 2025-01-27 VITALS
HEART RATE: 74 BPM | WEIGHT: 233 LBS | HEIGHT: 62 IN | BODY MASS INDEX: 42.88 KG/M2 | SYSTOLIC BLOOD PRESSURE: 129 MMHG | OXYGEN SATURATION: 96 % | DIASTOLIC BLOOD PRESSURE: 82 MMHG

## 2025-01-27 DIAGNOSIS — I63.9 CEREBROVASCULAR ACCIDENT (CVA), UNSPECIFIED MECHANISM: Primary | ICD-10-CM

## 2025-01-27 DIAGNOSIS — I63.9 CEREBROVASCULAR ACCIDENT (CVA), UNSPECIFIED MECHANISM: ICD-10-CM

## 2025-01-27 DIAGNOSIS — I25.10 SMALL VESSEL CORONARY ARTERY DISEASE: ICD-10-CM

## 2025-01-27 DIAGNOSIS — I10 BENIGN HYPERTENSION: ICD-10-CM

## 2025-01-27 DIAGNOSIS — E78.2 MIXED HYPERLIPIDEMIA: ICD-10-CM

## 2025-01-27 DIAGNOSIS — E66.01 OBESITY, CLASS III, BMI 40-49.9 (MORBID OBESITY): ICD-10-CM

## 2025-01-27 NOTE — PROGRESS NOTES
[facility] CARDIOLOGY      REASON FOR FOLLOW-UP:  Coronary artery disease  Hypertension  Hypertensive cardiovascular disease          Chief Complaint   Patient presents with    Heart Problem         Dear Marvin, Sony Grimaldo MD        History of Present Illness   It was my pleasure to see Gissel in the office today.  She is a 54-year-old female with known history of coronary artery disease per heart catheterization performed 12/12/2018 in which she was found to have small vessel disease in the distal LAD with elevated LVEDP.  Left ventricular ejection fraction low normal at 50%.  She has additional history that includes diabetes mellitus 2, hypertension with hypertensive cardiovascular disease, dyslipidemia, history of lower extremity DVT on anticoagulation prior, history of heart failure with preserved ejection fraction.  2D echo performed 9/9/2020 with normal LV systolic function with EF 60-65%.  Trace TR/AR. Normal pulmonary artery pressure.  Gissel was hospitalized at [facility] 4/2/2024 for symptoms of chest pain. Records were reviewed-she ruled out for acute coronary syndrome and underwent nuclear stress testing that showed no evidence of ischemia. 2D echocardiogram was also performed with normal LV function and wall motion with no valvular pathology reported. Due to her ongoing symptoms and concern for underlying disease as well as false negative stress testing, left heart catheterization was performed that showed normal coronaries.      Gissel was admitted May 15, 2024 for stroke symptoms. She reported onset while at work with speech difficulty followed by left upper and lower extremity weakness. She was transferred to [facility]. CT/CTA head and neck were negative and patient was treated with thrombolytic therapy-tPA.  Plavix and aspirin were added to her medication regimen. JOANNE at [facility]: EF 55-60%, grade 1 DD, normal RV structure and function, no evidence of  thrombus within the LA or LEDY, bubble study with no shunting noted across the intra-atrial septum, mild aortic plaquing.    The patient reports today that she has fully recovered from her TIA/stroke.  She describes no further left-sided weakness.  She does have occasional NS and lightheadedness without near syncopal or syncopal episodes.  She reports no lower extremity edema, orthopnea or PND.  She does have sensation of her heart racing occasionally.    Most recent labs 1/21/2025: Creatinine 2.25 (1.73, 1.97), CBC unremarkable      ASSESSMENT:  Cryptogenic stroke with left hemiparesis treated with thrombolytic therapy  Coronary artery disease- small vessel.  No evidence of coronary disease on heart cath April 2024  History of heart failure with preserved ejection fraction  Hypertension with hypertensive cardiovascular disease  Dyslipidemia  Chronic kidney disease  History of lower extremity DVT  Morbid obesity with BMI 51.76 status post gastric sleeve surgery 10/19/2022  Trivial valvular heart disease    PLAN:  I will order 30-day mobile cardiac telemetry to evaluate for any evidence of A-fib that may have contributed to her stroke symptoms.  Risk factor modification including heart healthy diet, exercise as tolerated, healthy weight.  Surveillance labs per primary care.        CHF Guideline Directed Medical Therapy  Beta Blocker:   ARNI/ACE/ARB:   SGLT 2 inhibitors:   Diuretics:   Aldosterone Antagonist:   Vasodilators & Nitrates:       Diagnoses and all orders for this visit:    1. Cerebrovascular accident (CVA), unspecified mechanism (Primary)  -     Cardiac Event Monitor (CHLOÉ) or Mobile Cardiac Outpatient Telemetry (MCT); Future    2. Benign hypertension    3. Small vessel coronary artery disease    4. Mixed hyperlipidemia    5. Obesity, Class III, BMI 40-49.9 (morbid obesity)          The following portions of the patient's history were reviewed and updated as appropriate: allergies, current medications, past  family history, past medical history, past social history, past surgical history, and problem list.    REVIEW OF SYSTEMS:    Review of Systems   Cardiovascular:  Positive for palpitations.   Neurological:  Positive for dizziness and light-headedness.   All other systems reviewed and are negative.      Vitals:    01/27/25 1325   BP: 129/82   Pulse: 74   SpO2: 96%         PHYSICAL EXAM:    General: Alert, cooperative, no distress, appears stated age  Head:  Normocephalic, atraumatic, mucous membranes moist  Eyes:  Conjunctiva/corneas clear, EOM's intact     Neck:  Supple,  no JVD or bruit     Lungs: Clear to auscultation bilaterally, no wheezes rhonchi rales are noted  Chest wall: No tenderness  Musculoskeletal:   Ambulates freely without assistance  Heart::  Regular rate and rhythm, S1 and S2 normal, no murmur, rub or gallop  Abdomen: Soft, non-tender, nondistended, bowel sounds active, no abdominal bruit  Extremities: No cyanosis, clubbing, or edema   Pulses: 2+ and symmetric all extremities  Skin:  No rashes or lesions  Neuro/psych: A&O x3. CN II through XII are grossly intact with appropriate affect        Past Medical History:   Diagnosis Date    Anxiety     Asthma     CAD (coronary artery disease)     Depression     Diabetes     MAX (dyspnea on exertion)     Fibromyalgia     GERD (gastroesophageal reflux disease)     HTN (hypertension)     Hyperlipemia     IBS (irritable bowel syndrome)     Plantar fasciitis     Sleep apnea     cpap       Past Surgical History:   Procedure Laterality Date    BREAST SURGERY Bilateral     2015, 2020    CARDIAC CATHETERIZATION      COLONOSCOPY  2017    COLONOSCOPY W/ BIOPSIES AND POLYPECTOMY  06/15/2022    5 polyps removed    ENDOSCOPY  2021    GALLBLADDER SURGERY  2010    GASTRIC SLEEVE LAPAROSCOPIC N/A 10/19/2022    Procedure: GASTRIC SLEEVE LAPAROSCOPIC WITH DAVINCI ROBOT;  Surgeon: Gloria Marinelli MD;  Location: [facility] MAIN OR;  Service: Robotics - DaVinci;  Laterality: N/A;     HYSTERECTOMY  2013    KNEE SURGERY Right 2017    LAPAROSCOPIC TUBAL LIGATION  2000    TONSILLECTOMY  1976         Current Outpatient Medications:     albuterol sulfate  (90 Base) MCG/ACT inhaler, Inhale 2 puffs Every 4 (Four) Hours As Needed., Disp: , Rfl:     amitriptyline (ELAVIL) 25 MG tablet, Take 1 tablet by mouth Every Night., Disp: , Rfl:     aspirin 81 MG EC tablet, Take 1 tablet by mouth Daily., Disp: , Rfl:     Atogepant (Qulipta) 60 MG tablet, Take 1 tablet by mouth Daily., Disp: 30 tablet, Rfl: 5    azithromycin (ZITHROMAX) 250 MG tablet, TAKE 2 TABLETS (500 MG) BY ORAL ROUTE ONCE DAILY FOR 1 DAY THEN 1 TABLET (250 MG) BY ORAL ROUTE ONCE DAILY FOR 4 DAYS, Disp: , Rfl:     B-D UF III MINI PEN NEEDLES 31G X 5 MM misc, USE AS DIRECTED TWICE DAILY FOR INSULIN INJECTIONS, Disp: , Rfl:     busPIRone (BUSPAR) 15 MG tablet, Take 1 tablet by mouth As Needed (anxiety)., Disp: , Rfl:     Cariprazine HCl (VRAYLAR) 1.5 MG capsule capsule, Take 1 capsule by mouth Every Night., Disp: , Rfl:     Continuous Blood Gluc Sensor (Dexcom G6 Sensor), Every 10 (Ten) Days., Disp: , Rfl:     Continuous Blood Gluc Sensor (FreeStyle Sushma 14 Day Sensor) misc, , Disp: , Rfl:     Dapagliflozin Propanediol (Farxiga) 10 MG tablet, Take 10 mg by mouth Every Night., Disp: , Rfl:     doxepin (SINEquan) 25 MG capsule, Take 1 capsule by mouth Every Night., Disp: , Rfl:     Evolocumab (REPATHA) solution auto-injector SureClick injection, Inject 1 mL under the skin into the appropriate area as directed Every 14 (Fourteen) Days., Disp: 1 mL, Rfl: 3    fenofibrate (TRICOR) 145 MG tablet, Take 1 tablet by mouth Daily., Disp: , Rfl:     Fluticasone-Umeclidin-Vilant (Trelegy Ellipta) 100-62.5-25 MCG/INH inhaler, Trelegy Ellipta 100 mcg-62.5 mcg-25 mcg powder for inhalation  INHALE 1 PUFF BY MOUTH EVERY DAY, Disp: , Rfl:     gabapentin (NEURONTIN) 600 MG tablet, Take 1 tablet by mouth 3 (Three) Times a Day As Needed., Disp: , Rfl:      glucose blood test strip, Accu-Chek Guide test strips, Disp: , Rfl:     HYDROcodone-acetaminophen (NORCO)  MG per tablet, Take 1 tablet by mouth Every 8 (Eight) Hours As Needed (chronic back pain)., Disp: , Rfl:     Insulin Pen Needle (GNP UltiCare Pen Needles) 32G X 4 MM misc, 32g 5/32, Disp: , Rfl:     Insulin Regular Human (HUMULIN R U-500 KWIKPEN SC), 150 Units. 75 am and  75 pm, Disp: , Rfl:     losartan (COZAAR) 25 MG tablet, Take 4 tablets by mouth Daily., Disp: , Rfl:     nystatin (MYCOSTATIN) 414571 UNIT/GM powder, APPLY TO THE AFFECTED AREA(S) BY TOPICAL ROUTE 2 TIMES PER DAY, Disp: , Rfl:     ondansetron ODT (ZOFRAN-ODT) 8 MG disintegrating tablet, DISSOLVE 1 TABLET IN MOUTH EVERY EIGHT HOURS AS NEEDED FOR NAUSEA AND VOMITING, Disp: 30 tablet, Rfl: 0    pantoprazole (PROTONIX) 40 MG EC tablet, Take 1 tablet by mouth twice a day for 30 days, Disp: , Rfl:     propranolol (INDERAL) 80 MG tablet, Take 1 tablet by mouth Daily., Disp: , Rfl:     Rimegepant Sulfate (Nurtec) 75 MG tablet dispersible tablet, Take 1 tablet by mouth As Needed (migraine)., Disp: 8 tablet, Rfl: 5    rOPINIRole (REQUIP) 2 MG tablet, Take 1 tablet by mouth Every Night., Disp: , Rfl:     sertraline (ZOLOFT) 100 MG tablet, Take 1 tablet by mouth 2 (Two) Times a Day., Disp: , Rfl:     Tirzepatide (Mounjaro) 10 MG/0.5ML solution pen-injector pen, Inject 10 mg every week by subcutaneous route., Disp: , Rfl:     TiZANidine (ZANAFLEX) 4 MG capsule, Take 1 capsule by mouth 3 (Three) Times a Day As Needed for Muscle Spasms., Disp: , Rfl:     vitamin D (ERGOCALCIFEROL) 1.25 MG (54162 UT) capsule capsule, Take 1 capsule by mouth 1 (One) Time Per Week. Wednesdays, Disp: , Rfl:     Allergies   Allergen Reactions    Trulicity [Dulaglutide] Other (See Comments)     Caused pancreatitis    Penicillins Rash and Unknown - High Severity     Childhood Rx       Family History   Problem Relation Age of Onset    Cancer Mother 60        Breast    COPD  "Father 64    Heart disease Father     Obesity Maternal Grandmother        Social History     Tobacco Use    Smoking status: Never    Smokeless tobacco: Never   Substance Use Topics    Alcohol use: Never           Current Electrocardiogram:  Procedures        EMR Dragon/Transcription:   \"Dictated utilizing Dragon dictation\".     Copied text in this note has been reviewed by me and is accurate as of 01/28/25.    "

## 2025-01-28 LAB
QT INTERVAL: 381 MS
QTC INTERVAL: 403 MS

## 2025-02-10 ENCOUNTER — OFFICE VISIT (OUTPATIENT)
Dept: BARIATRICS/WEIGHT MGMT | Facility: CLINIC | Age: 55
End: 2025-02-10
Payer: OTHER GOVERNMENT

## 2025-02-10 VITALS — WEIGHT: 231.3 LBS | BODY MASS INDEX: 42.57 KG/M2 | HEIGHT: 62 IN

## 2025-02-10 DIAGNOSIS — E66.01 OBESITY, CLASS III, BMI 40-49.9 (MORBID OBESITY): Primary | ICD-10-CM

## 2025-02-10 PROCEDURE — 97803 MED NUTRITION INDIV SUBSEQ: CPT | Performed by: DIETITIAN, REGISTERED

## 2025-02-10 NOTE — PROGRESS NOTES
Nutrition Services    Patient Name: Gissel Amaro  YOB: 1970  MRN: [id number]  Date of Service: 02/10/25      ICD-10-CM ICD-9-CM   1. Obesity, Class III, BMI 40-49.9 (morbid obesity)  E66.01 278.01          NUTRITION ASSESSMENT - BARIATRIC SURGERY      Reason for Visit Continued weight loss s/p 10/2022     H&P      Past Medical History:   Diagnosis Date    Anxiety     Asthma     CAD (coronary artery disease)     Depression     Diabetes     MAX (dyspnea on exertion)     Fibromyalgia     GERD (gastroesophageal reflux disease)     HTN (hypertension)     Hyperlipemia     IBS (irritable bowel syndrome)     Plantar fasciitis     Sleep apnea     cpap       Past Surgical History:   Procedure Laterality Date    BREAST SURGERY Bilateral     2015, 2020    CARDIAC CATHETERIZATION      COLONOSCOPY  2017    COLONOSCOPY W/ BIOPSIES AND POLYPECTOMY  06/15/2022    5 polyps removed    ENDOSCOPY  2021    GALLBLADDER SURGERY  2010    GASTRIC SLEEVE LAPAROSCOPIC N/A 10/19/2022    Procedure: GASTRIC SLEEVE LAPAROSCOPIC WITH Reverse [employer] ROBOT;  Surgeon: Gloria Marinelli MD;  Location: [facility];  Service: Robotics - DaVinci;  Laterality: N/A;    HYSTERECTOMY  2013    KNEE SURGERY Right 2017    LAPAROSCOPIC TUBAL LIGATION  2000    TONSILLECTOMY  1976        Previous Goals   Patient to add in exercise 2 days each week and try chair exercises or bike at home  Patient to increase protein intake and aim for 60-80 grams each day       Encounter Information        Visit Narrative     Patient has been working on diet and exercise. Patient currently taking Mounjaro. Patient states she does feel nauseous for the first few days after injections. Encouraged patient to eat easy, bland foods until she feels better. Patient has been increasing protein intake through out the day. Patient also planning on increase exercise.     Diet Recall:   Breakfast: eggs and duncan with 1/2 biscuit  Lunch:   Dinner: salads with ham or  "turkey  Snacks:  Beverages:    Exercise: patient has been exercising at home and planning on going to [facility] with son    Supplements:    Self Monitoring:          Anthropometrics        Current Height, Weight Height: 157.5 cm (62\")  Weight: 105 kg (231 lb 4.8 oz) (02/10/25 1334)       Trending Weight Hx      Wt Readings from Last 30 Encounters:   02/10/25 1334 105 kg (231 lb 4.8 oz)   01/27/25 1325 106 kg (233 lb)   01/21/25 2220 107 kg (235 lb)   12/16/24 1417 107 kg (234 lb 12.8 oz)   11/12/24 1451 107 kg (235 lb 6.4 oz)   10/21/24 1531 109 kg (239 lb 8 oz)   10/08/24 0932 106 kg (234 lb)   07/02/24 0800 109 kg (241 lb)   04/22/24 0935 110 kg (242 lb)   01/05/24 0828 112 kg (248 lb)   10/20/23 1337 116 kg (256 lb 6.4 oz)   06/22/23 0807 115 kg (253 lb)   04/19/23 1336 115 kg (254 lb 3.2 oz)   01/16/23 0952 115 kg (254 lb 9.6 oz)   12/06/22 0950 116 kg (256 lb)   11/21/22 1021 117 kg (257 lb 9.6 oz)   10/24/22 1030 119 kg (263 lb 3.2 oz)   10/19/22 2145 128 kg (281 lb 3.2 oz)   10/19/22 0600 123 kg (271 lb 6.4 oz)   10/05/22 1527 127 kg (279 lb)   09/30/22 1003 128 kg (281 lb 3.2 oz)   09/01/22 1357 125 kg (276 lb 9.6 oz)   08/08/22 1029 125 kg (275 lb 3.2 oz)   07/14/22 1317 124 kg (272 lb 6.4 oz)   06/17/22 1335 123 kg (270 lb 6.4 oz)   05/31/22 1300 122 kg (268 lb)   05/18/22 1417 122 kg (269 lb)   04/26/22 1123 124 kg (272 lb 9.6 oz)   11/29/21 1323 128 kg (283 lb)   08/23/21 1320 122 kg (270 lb)   02/22/21 1500 117 kg (259 lb)   11/19/20 1450 111 kg (245 lb)      BMI kg/m2 Body mass index is 42.31 kg/m².       Nutrition Diagnosis         Nutrition Dx Statement Overweight/obesity RT multifactorial biochemical, behavioral and environmental contributors to disease AEB BMI 42.31 kg/m^2         Nutrition Intervention         Nutrition Intervention Nutrition education related to diet modification and physical activity        Monitor/Evaluation        New Goals Patient to increase exercise and use home bike and go to " [employer] with son  Patient to continue increasing protein intake       Total time spent with pt 15 minutes of which 15 minutes were spent on education.       Electronically signed by:  Olga Lidia Schultz RD  02/10/25 13:44 EST

## 2025-02-11 ENCOUNTER — OFFICE (AMBULATORY)
Dept: URBAN - METROPOLITAN AREA CLINIC 64 | Facility: CLINIC | Age: 55
End: 2025-02-11
Payer: OTHER GOVERNMENT

## 2025-02-11 VITALS
WEIGHT: 232 LBS | HEIGHT: 62 IN | DIASTOLIC BLOOD PRESSURE: 76 MMHG | SYSTOLIC BLOOD PRESSURE: 137 MMHG | HEART RATE: 63 BPM

## 2025-02-11 DIAGNOSIS — R13.10 DYSPHAGIA, UNSPECIFIED: ICD-10-CM

## 2025-02-11 DIAGNOSIS — K58.0 IRRITABLE BOWEL SYNDROME WITH DIARRHEA: ICD-10-CM

## 2025-02-11 DIAGNOSIS — R11.2 NAUSEA WITH VOMITING, UNSPECIFIED: ICD-10-CM

## 2025-02-11 PROCEDURE — 99213 OFFICE O/P EST LOW 20 MIN: CPT | Performed by: NURSE PRACTITIONER

## 2025-02-18 ENCOUNTER — OFFICE (AMBULATORY)
Dept: URBAN - METROPOLITAN AREA PATHOLOGY 19 | Facility: PATHOLOGY | Age: 55
End: 2025-02-18
Payer: OTHER GOVERNMENT

## 2025-02-18 ENCOUNTER — ON CAMPUS - OUTPATIENT (AMBULATORY)
Dept: URBAN - METROPOLITAN AREA HOSPITAL 2 | Facility: HOSPITAL | Age: 55
End: 2025-02-18
Payer: OTHER GOVERNMENT

## 2025-02-18 VITALS
DIASTOLIC BLOOD PRESSURE: 69 MMHG | WEIGHT: 230 LBS | RESPIRATION RATE: 21 BRPM | HEART RATE: 71 BPM | OXYGEN SATURATION: 97 % | HEART RATE: 79 BPM | SYSTOLIC BLOOD PRESSURE: 106 MMHG | SYSTOLIC BLOOD PRESSURE: 130 MMHG | RESPIRATION RATE: 17 BRPM | DIASTOLIC BLOOD PRESSURE: 78 MMHG | OXYGEN SATURATION: 100 % | HEART RATE: 68 BPM | SYSTOLIC BLOOD PRESSURE: 177 MMHG | RESPIRATION RATE: 16 BRPM | SYSTOLIC BLOOD PRESSURE: 125 MMHG | HEART RATE: 69 BPM | DIASTOLIC BLOOD PRESSURE: 70 MMHG | HEIGHT: 62 IN | RESPIRATION RATE: 15 BRPM | RESPIRATION RATE: 14 BRPM | HEART RATE: 74 BPM | DIASTOLIC BLOOD PRESSURE: 77 MMHG | TEMPERATURE: 98.6 F | DIASTOLIC BLOOD PRESSURE: 81 MMHG | OXYGEN SATURATION: 99 % | DIASTOLIC BLOOD PRESSURE: 65 MMHG | SYSTOLIC BLOOD PRESSURE: 115 MMHG | SYSTOLIC BLOOD PRESSURE: 109 MMHG | HEART RATE: 80 BPM

## 2025-02-18 DIAGNOSIS — K31.89 OTHER DISEASES OF STOMACH AND DUODENUM: ICD-10-CM

## 2025-02-18 DIAGNOSIS — Z48.815 ENCOUNTER FOR SURGICAL AFTERCARE FOLLOWING SURGERY ON THE DI: ICD-10-CM

## 2025-02-18 DIAGNOSIS — R13.10 DYSPHAGIA, UNSPECIFIED: ICD-10-CM

## 2025-02-18 LAB
GI HISTOLOGY: PDF REPORT: (no result)
Lab: (no result)

## 2025-02-18 PROCEDURE — 88305 TISSUE EXAM BY PATHOLOGIST: CPT | Performed by: PATHOLOGY

## 2025-02-18 PROCEDURE — 43450 DILATE ESOPHAGUS 1/MULT PASS: CPT | Performed by: INTERNAL MEDICINE

## 2025-02-18 PROCEDURE — 43239 EGD BIOPSY SINGLE/MULTIPLE: CPT | Performed by: INTERNAL MEDICINE

## 2025-02-28 ENCOUNTER — TELEPHONE (OUTPATIENT)
Dept: CARDIOLOGY | Facility: CLINIC | Age: 55
End: 2025-02-28
Payer: OTHER GOVERNMENT

## 2025-02-28 NOTE — TELEPHONE ENCOUNTER
Caller: Gissel Amaro    Relationship to patient: Self    Best call back number: [phone]     Patient is needing: PT REQUESTING MONITOR RESULTS- [facility] CALL & ADVISE.

## 2025-03-04 LAB
CV ZIO BASELINE AVG BPM: 76
CV ZIO BASELINE BPM HIGH: 111
CV ZIO BASELINE BPM LOW: 55

## 2025-03-06 NOTE — TELEPHONE ENCOUNTER
Patient informed-   No atrial fibrillation or other arrhythmias that may have caused stroke.  Per Clara Cronin

## 2025-05-02 NOTE — PROGRESS NOTES
Chief Complaint  Migraine and Sleep Apnea    Subjective          Gissel Amaro presents to [facility] NEUR[facility] for migraines, rls  History of Present Illness    Gissel Amaro is a 54-year-old female seen today in follow-up primarily for migraine headache, but the patient asks about AMOS and PAP management today as well.  She was last seen by Dr. 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  She has approximately 3 migraines/month that last around 4 to 5 hours.  She currently takes Qulipta 60 mg daily and Nurtec as needed.  The beginning of April she had a migraine that lasted 2 days.  She went to the hospital and received a migraine cocktail of Fioricet and Zofran.  She denies having visual aura with her migraine.    Patient feels fairly pleased with current migraine control and believes that severe migraine she had in April was due to increased stressors within the family.  She is only sleeping 3 to 4 hours at a time as well.    The patient is currently on a nasal mask and started PAP therapy in 2009.  She got a new device through [facility] 3/14/2024 and uses PAP therapy every night.        Follow up on migraine  Medication: quilpta daily, nurtec prn, propranolol, amitriptyline  Frequency-3 a month  Duration-4-5 hour  Change in migraine   Past meds: imitrex, Emgality    RLS- Dr. Houston is taking care of this--ropinirole            ==================================================================  Dr. Seipel Previous Office Visit 10-8-2024------------------------------  New patient referred by Dr. Wong for Left BODY , RIGHT cerebral hemisphere cerebrovascular accident happened 5/15/24. POST NKA MRI WAS NORMAL   NO MIGRAINE ON DAY OF THE STROKE.   Patient states she was at work on a phone call and started to feel weird and she started to stumble,   she then had her blood sugar checked but it was normal she states she then went to [facility] and could walk about time she  arrived there.      Patient states her short term memory and headaches is a residual effect from stroke,   she states headaches are constant she states she gets headaches all over no certain areas   she states she does have a h/o migraines and currently taking emgality.   She states her last headache was 2 days ago and lasted all day     PRIOR TO STROKE IN MAY HAD INFREQUENT MIGRAINE RELIEVED WITH TYLENOL, OTHER DAYS NO HEADACHE  EVER SINCE STROKE HAS HAD INFREQUENT MIGRAINE, BUT NOW HAS DAILY HEADACHE  DAILY HEADACHE: ENTIRE HEAD, ACHE, ABOUT THE SAME ALL DAY LONG, IF MIGRAINE IS A 5 ON A SCALE OF 0-5, DAILY GARZA IS A 3  EMGAILITY FOR THREE MONTHS, HAS NOT HELPED THE DAILY HEADACHE    Assessment and Plan    Diagnoses and all orders for this visit:     1. Obstructive sleep apnea syndrome (Primary)     2. Intractable migraine with aura without status migrainosus     3. Restless legs     CONTINUE CPAP     CONTINUE REQUIP FOR RLS     CHANGE TO QULIPTA, AS EMGAILITY HAS NOT HELPED THE HEADACHES.   CONTINUE INDERAL AND ELAVIL AS THESE MEDICATIONS DUE HELP PREVENT MIGRAINE     Current Outpatient Medications:     albuterol sulfate  (90 Base) MCG/ACT inhaler, Inhale 2 puffs Every 4 (Four) Hours As Needed., Disp: , Rfl:     Atogepant (Qulipta) 60 MG tablet, Take 1 tablet by mouth Daily., Disp: 30 tablet, Rfl: 11    B-D UF III MINI PEN NEEDLES 31G X 5 MM misc, USE AS DIRECTED TWICE DAILY FOR INSULIN INJECTIONS, Disp: , Rfl:     busPIRone (BUSPAR) 15 MG tablet, Take 1 tablet by mouth As Needed (anxiety)., Disp: , Rfl:     Cariprazine HCl (VRAYLAR) 1.5 MG capsule capsule, Take 1 capsule by mouth Every Night., Disp: , Rfl:     Continuous Blood Gluc Sensor (Dexcom G6 Sensor), Every 10 (Ten) Days., Disp: , Rfl:     Continuous Blood Gluc Sensor (FreeStyle Sushma 14 Day Sensor) misc, , Disp: , Rfl:     Dapagliflozin Propanediol (Farxiga) 10 MG tablet, Take 10 mg by mouth Every Night., Disp: , Rfl:     doxepin (SINEquan) 25 MG  capsule, Take 1 capsule by mouth Every Night., Disp: , Rfl:     Evolocumab (REPATHA) solution auto-injector SureClick injection, Inject 1 mL under the skin into the appropriate area as directed Every 14 (Fourteen) Days., Disp: 1 mL, Rfl: 3    fenofibrate (TRICOR) 145 MG tablet, Take 1 tablet by mouth Daily., Disp: , Rfl:     Fluticasone-Umeclidin-Vilant (Trelegy Ellipta) 100-62.5-25 MCG/INH inhaler, Trelegy Ellipta 100 mcg-62.5 mcg-25 mcg powder for inhalation  INHALE 1 PUFF BY MOUTH EVERY DAY, Disp: , Rfl:     gabapentin (NEURONTIN) 600 MG tablet, Take 1 tablet by mouth 3 (Three) Times a Day As Needed., Disp: , Rfl:     glucose blood test strip, Accu-Chek Guide test strips, Disp: , Rfl:     HYDROcodone-acetaminophen (NORCO)  MG per tablet, Take 1 tablet by mouth Every 8 (Eight) Hours As Needed (chronic back pain)., Disp: , Rfl:     Insulin Pen Needle (GNP UltiCare Pen Needles) 32G X 4 MM misc, 32g 5/32, Disp: , Rfl:     Insulin Regular Human (HUMULIN R U-500 [facility]), 150 Units. 75 am and  75 pm, Disp: , Rfl:     losartan (COZAAR) 25 MG tablet, Take 4 tablets by mouth Daily., Disp: , Rfl:     meclizine (ANTIVERT) 25 MG tablet, Take 1 tablet by mouth 3 (Three) Times a Day., Disp: , Rfl:     nystatin (MYCOSTATIN) 271872 UNIT/GM powder, APPLY TO THE AFFECTED AREA(S) BY TOPICAL ROUTE 2 TIMES PER DAY, Disp: , Rfl:     ondansetron ODT (ZOFRAN-ODT) 8 MG disintegrating tablet, DISSOLVE 1 TABLET IN MOUTH EVERY EIGHT HOURS AS NEEDED FOR NAUSEA AND VOMITING, Disp: 30 tablet, Rfl: 0    pantoprazole (PROTONIX) 40 MG EC tablet, Take 1 tablet by mouth twice a day for 30 days, Disp: , Rfl:     propranolol (INDERAL) 80 MG tablet, Take 1 tablet by mouth Daily., Disp: , Rfl:     Rimegepant Sulfate (Nurtec) 75 MG tablet dispersible tablet, Take 1 tablet by mouth As Needed (migraine)., Disp: 8 tablet, Rfl: 5    rOPINIRole (REQUIP) 2 MG tablet, Take 1 tablet by mouth Every Night., Disp: , Rfl:     sertraline (ZOLOFT) 100 MG  "tablet, Take 1 tablet by mouth 2 (Two) Times a Day., Disp: , Rfl:     Tirzepatide (Mounjaro) 10 MG/0.5ML solution pen-injector pen, Inject 10 mg every week by subcutaneous route., Disp: , Rfl:     TiZANidine (ZANAFLEX) 4 MG capsule, Take 1 capsule by mouth 3 (Three) Times a Day As Needed for Muscle Spasms., Disp: , Rfl:     vitamin D (ERGOCALCIFEROL) 1.25 MG (40669 UT) capsule capsule, Take 1 capsule by mouth 1 (One) Time Per Week. Wednesdays, Disp: , Rfl:     Review of Systems   Constitutional:  Positive for appetite change.   Respiratory:  Positive for apnea.    Endocrine: Positive for cold intolerance.   Neurological:  Positive for dizziness, light-headedness and headaches. Negative for numbness.   Psychiatric/Behavioral:  Positive for decreased concentration and sleep disturbance.           Objective:    Vital Signs:   /84   Pulse 72   Ht 157.5 cm (62\")   Wt 102 kg (225 lb)   BMI 41.15 kg/m²     Physical Exam  Vitals reviewed.   Constitutional:       Appearance: She is well-developed. She is morbidly obese.   HENT:      Head: Normocephalic and atraumatic.      Comments: MMP 3  Eyes:      General: Lids are normal.      Extraocular Movements: Extraocular movements intact.      Pupils: Pupils are equal, round, and reactive to light.   Neck:      Vascular: No carotid bruit.   Cardiovascular:      Rate and Rhythm: Normal rate.      Heart sounds: No murmur heard.  Pulmonary:      Effort: Pulmonary effort is normal.   Musculoskeletal:      Cervical back: Normal range of motion and neck supple.   Skin:     General: Skin is warm and dry.   Neurological:      Mental Status: She is oriented to person, place, and time.      Cranial Nerves: Cranial nerves 2-12 are intact. No cranial nerve deficit.      Sensory: No sensory deficit.      Motor: Motor function is intact. No tremor.      Coordination: Finger-Nose-Finger Test normal. Rapid alternating movements normal.      Gait: Gait normal.   Psychiatric:         " Attention and Perception: Attention normal.         Mood and Affect: Mood normal.         Speech: Speech normal.         Behavior: Behavior normal.         Cognition and Memory: Cognition and memory normal.         Judgment: Judgment normal.        Result Review :                Neurological Exam  Mental Status  Awake, alert and oriented to person, place and time. Oriented to person, place and time. Oriented to person, place, and time. Memory is normal. Recent and remote memory are intact. Speech is normal. Language is fluent with no aphasia. Attention and concentration are normal. Fund of knowledge is appropriate for level of education.    Cranial Nerves  CN II: Visual acuity is normal. Visual fields full to confrontation.  CN III, IV, VI: Extraocular movements intact bilaterally. Normal lids and orbits bilaterally. Pupils equal round and reactive to light bilaterally.  CN V: Facial sensation is normal.  CN VII: Full and symmetric facial movement.  CN IX, X: Palate elevates symmetrically. Normal gag reflex.  CN XI: Shoulder shrug strength is normal.  CN XII: Tongue midline without atrophy or fasciculations.    Motor   No abnormal involuntary movements. No pronator drift.    Sensory  Light touch is normal in upper and lower extremities.     Coordination  No tremorRight: Finger-to-nose normal. Rapid alternating movement normal.Left: Finger-to-nose normal. Rapid alternating movement normal.    Gait   Normal gait.Casual gait is normal including stance, stride, and arm swing. Able to rise from chair without using arms.         Assessment and Plan    Diagnoses and all orders for this visit:    1. Migraine without aura and without status migrainosus, not intractable (Primary)  -     Vitamin B12 & Folate  -     Magnesium; Future  -     CBC Auto Differential  -     Comprehensive Metabolic Panel  -     Atogepant (Qulipta) 60 MG tablet; Take 1 tablet by mouth Daily.  Dispense: 30 tablet; Refill: 11    2. Obstructive sleep  apnea syndrome  -     PAP Therapy      Gissel Amaro on the seen today for migraine without aura.  She has seen Dr. Seipel once and is currently taking Qulipta 60 mg daily, Nurtec as needed, amitriptyline 25 mg nightly, and 80 mg propranolol daily.  She feels migraine is fairly well-controlled and having 3/months.  She did have an episode in April with a severe migraine that lasted approximately 2 days and required ER visit for migraine cocktail.    She will continue Qulipta 60 mg daily    Continue Nurtec 75 mg ODT as needed as needed    I would like for her to try Ubrelvy 100 mg at the onset of her next migraine.  She may repeat dose 2 hours later if needed.  I provided the patient with 2 samples of Ubrelvy 100 mg (LOT [id number], exp 12/26).    Will check additional labs today including magnesium and B12.  The patient had a gastric sleeve and she may have a deficiency that could contribute to increased migraine.    I was able to pull her CPAP compliance report from [employer].  Patient is 99% compliant over 4 hours over the last 90 days and it is effective in treating her sleep apnea with an overall AHI of 1.6.  She will continue to wear CPAP over 4 hours every night and I encouraged 7 to 8 hours of sleep each night.    She will follow-up in 6 months or sooner if needed       Follow Up   Return in about 6 months (around 11/6/2025) for HX stroke, Jaqueline Corbin or Seipel if patient chooses.  Patient was given instructions and counseling regarding her condition or for health maintenance advice. Please see specific information pulled into the AVS if appropriate.     This document has been electronically signed by KAYLAH Ravi  on May 6, 2025 10:42 EDT

## 2025-05-06 ENCOUNTER — OFFICE VISIT (OUTPATIENT)
Dept: NEUROLOGY | Facility: CLINIC | Age: 55
End: 2025-05-06
Payer: OTHER GOVERNMENT

## 2025-05-06 VITALS
DIASTOLIC BLOOD PRESSURE: 84 MMHG | SYSTOLIC BLOOD PRESSURE: 134 MMHG | WEIGHT: 225 LBS | BODY MASS INDEX: 41.41 KG/M2 | HEIGHT: 62 IN | HEART RATE: 72 BPM

## 2025-05-06 DIAGNOSIS — G47.33 OBSTRUCTIVE SLEEP APNEA SYNDROME: ICD-10-CM

## 2025-05-06 DIAGNOSIS — G43.009 MIGRAINE WITHOUT AURA AND WITHOUT STATUS MIGRAINOSUS, NOT INTRACTABLE: Primary | ICD-10-CM

## 2025-05-06 RX ORDER — SUMATRIPTAN SUCCINATE 100 MG/1
TABLET ORAL
COMMUNITY
End: 2025-05-06

## 2025-05-06 RX ORDER — ONDANSETRON 4 MG/1
4 TABLET, FILM COATED ORAL
COMMUNITY
Start: 2025-04-10 | End: 2025-05-06

## 2025-05-06 RX ORDER — SULFAMETHOXAZOLE AND TRIMETHOPRIM 800; 160 MG/1; MG/1
TABLET ORAL
COMMUNITY
Start: 2024-12-19 | End: 2025-05-06

## 2025-05-06 RX ORDER — MECLIZINE HYDROCHLORIDE 25 MG/1
25 TABLET ORAL 3 TIMES DAILY
COMMUNITY
Start: 2025-02-28

## 2025-05-06 RX ORDER — ATOGEPANT 60 MG/1
60 TABLET ORAL DAILY
Qty: 30 TABLET | Refills: 11 | Status: SHIPPED | OUTPATIENT
Start: 2025-05-06

## 2025-05-06 RX ORDER — DEXTROMETHORPHAN HYDROBROMIDE AND PROMETHAZINE HYDROCHLORIDE 15; 6.25 MG/5ML; MG/5ML
SYRUP ORAL
COMMUNITY
Start: 2024-12-13 | End: 2025-05-06

## 2025-05-09 ENCOUNTER — OFFICE VISIT (OUTPATIENT)
Dept: BARIATRICS/WEIGHT MGMT | Facility: CLINIC | Age: 55
End: 2025-05-09
Payer: OTHER GOVERNMENT

## 2025-05-09 VITALS
WEIGHT: 227.6 LBS | OXYGEN SATURATION: 97 % | SYSTOLIC BLOOD PRESSURE: 121 MMHG | HEIGHT: 62 IN | HEART RATE: 73 BPM | BODY MASS INDEX: 41.88 KG/M2 | DIASTOLIC BLOOD PRESSURE: 82 MMHG

## 2025-05-09 DIAGNOSIS — Z90.3 H/O GASTRIC SLEEVE: ICD-10-CM

## 2025-05-09 DIAGNOSIS — E66.01 OBESITY, CLASS III, BMI 40-49.9 (MORBID OBESITY): Primary | ICD-10-CM

## 2025-05-09 NOTE — PROGRESS NOTES
[facility]  [address] [place] [address]  [address] [place] [address]  Dept: [phone]  5/9/2025      Gissel Amaro.  [id number]  [id number]  1970  female    Date of last surgery: 10/19/2022Gastric Sleeve Laparoscopic With Davinci Robot      Chief Complaint: BH Post-Op Bariatric Surgery:   Gissel Amaro is status post procedure listed above  HPI:     Wt Readings from Last 10 Encounters:   05/09/25 103 kg (227 lb 9.6 oz)   05/06/25 102 kg (225 lb)   02/10/25 105 kg (231 lb 4.8 oz)   01/27/25 106 kg (233 lb)   01/21/25 107 kg (235 lb)   12/16/24 107 kg (234 lb 12.8 oz)   11/12/24 107 kg (235 lb 6.4 oz)   10/21/24 109 kg (239 lb 8 oz)   10/08/24 106 kg (234 lb)   07/02/24 109 kg (241 lb)        Today's weight is 103 kg (227 lb 9.6 oz) pounds,BMI 41.63 has a  loss of 4 pounds since the last visit and  weight loss since surgery is 54 pounds. The patient reports a decreased portion size and loss of appetite.      Gissel Amaro denies nausea and vomiting, having some GERD - controlled mostly with ppi      Diet and Exercise: Diet history reviewed and discussed with the patient. Weight loss/gains to date discussed with the patient.     She reports eating 3 meals per day, a typical portion size of 1/2-1 cup, eating 1 snacks per day, drinking 8 or more 8-oz. glasses of water per day, no carbonated beverage consumption and exercising regularly.       The patient states they are eating 50 grams of protein per day.     Breakfast: egg and duncan biscuit ( 1/2 biscuit)   Lunch: varies- chicken , [place] food  Dinner: cooking more at home- chicken- grilled , veggies   Snacks: none usually, beef snacks prn, crackers   Drinks:water, water with flavor packets, skinny syrups , no carbonation       Mounjaro prescribed by PCP - 12.5 mg weekly   Exercise: walking some, yard work , bowflex , inverter     Not able to tolerate protein  terrie post op    Multi vitamin       Review of Systems   Constitutional:  Positive for activity change, appetite change and fatigue.   Respiratory:          Sleep apnea   Cardiovascular:         CHF,  TIA hx, CAD   Gastrointestinal: Negative.    Endocrine:        DMII   Musculoskeletal:  Positive for back pain.         Patient Active Problem List   Diagnosis    Benign hypertension    Small vessel coronary artery disease    Diabetes mellitus    Edema    Mixed hyperlipidemia    CHF (congestive heart failure)    Morbid obesity    Obstructive sleep apnea syndrome    BMI 45.0-49.9, adult    Pre-operative examination    Obesity, Class III, BMI 40-49.9 (morbid obesity)    Vitamin D deficiency    Vitamin B12 deficiency (non anemic)    Thrombocytopenia    Stage 4 chronic kidney disease    Restless legs    Personal history of thrombophlebitis    Migraine headache    History of methicillin resistant Staphylococcus aureus infection    History of colonic polyps    Generalized anxiety disorder    Gastroesophageal reflux disease without esophagitis    Gastric polyp    Family history of malignant neoplasm of breast    Carpal tunnel syndrome       Past Medical History:   Diagnosis Date    Anxiety     Asthma     CAD (coronary artery disease)     Chronic pain disorder 2016    CTS (carpal tunnel syndrome)     Deep vein thrombosis 3/2012    Depression     Diabetes     MAX (dyspnea on exertion)     Fibromyalgia     GERD (gastroesophageal reflux disease)     Headache, tension-type     HL (hearing loss)     HTN (hypertension)     Hyperlipemia     IBS (irritable bowel syndrome)     Memory loss     Migraine     Neuropathy in diabetes     Panic disorder 2015    Plantar fasciitis     Sleep apnea     cpap    TIA (transient ischemic attack)      Past Surgical History:   Procedure Laterality Date    TONSILLECTOMY  1976    LAPAROSCOPIC TUBAL LIGATION  2000    GALLBLADDER SURGERY  2010    HYSTERECTOMY  2013    KNEE SURGERY Right 2017     COLONOSCOPY  2017    ENDOSCOPY  2021    COLONOSCOPY W/ BIOPSIES AND POLYPECTOMY  06/15/2022    5 polyps removed    GASTRIC SLEEVE LAPAROSCOPIC N/A 10/19/2022    Procedure: GASTRIC SLEEVE LAPAROSCOPIC WITH DAVINCI ROBOT;  Surgeon: Gloria Marinelli MD;  Location: [facility] MAIN OR;  Service: Robotics - DaVinci;  Laterality: N/A;    BREAST SURGERY Bilateral     2015, 2020    CARDIAC CATHETERIZATION        The following portions of the patient's history were reviewed and updated as appropriate: allergies, current medications, past medical history, past social history, past surgical history, and problem list.    Vitals:    05/09/25 1321   BP: 121/82   Pulse: 73   SpO2: 97%       Physical Exam  Constitutional:       Appearance: Normal appearance. She is obese.   Pulmonary:      Effort: Pulmonary effort is normal.   Abdominal:      General: Abdomen is flat.   Neurological:      General: No focal deficit present.      Mental Status: She is alert and oriented to person, place, and time.   Psychiatric:         Mood and Affect: Mood normal.         Behavior: Behavior normal.         Thought Content: Thought content normal.         Judgment: Judgment normal.             Assessment:   BMI 41.63, class 3 obesity 2.5 yr gastric sleeve     Post-op, the patient is doing well overall. She is trying to focus on protein intake and exercise. Encourage 20-30 minutes of exercise 2-3 days a week including strength training. Also encourage 60+ grams of protein in her diet a day. She is unable to tolerate protein shakes since surgery. She is on mounjaro prescribed by her PCP. States her hgb A1c is down to around 6 and she is taking about 1/3 the amount of insulin she was taking before surgery. Current dose 12.5 mg weekly. Denies side effects with the medication.  Plan to check yearly labs and follow up in Oct for 3 yr gastric sleeve.     Plan:     Encouraged patient to be sure to get plenty of lean protein per day through small frequent meals all  with a protein source.   Activity restrictions: none.   Recommended patient be sure to get at least 70 grams of protein per day by eating small, frequent meals all with high lean protein choices. Be sure to limit/cut back on daily carbohydrate intake. Discussed with the patient the recommended amount of water per day to intake- half of body weight in ounces. Reviewed vitamin requirements. Be sure to do routine exercise, 150 minutes per week minimum, including both cardio and strength training.     Instructions / Recommendations: dietary counseling recommended, recommended a daily protein intake of  grams, vitamin supplement(s) recommended, recommended exercising at least 150 minutes per week, behavior modifications recommended and instructed to call the office for concerns, questions, or problems.     The patient was instructed to follow up in 5 months.     The patient was counseled regarding diet and exercise/ mounjaro. Total time spent face to face was 20 minutes and 15 minutes was spent counseling.     KAYLAH Parry  [facility]

## 2025-06-17 ENCOUNTER — TELEPHONE (OUTPATIENT)
Dept: ONCOLOGY | Facility: CLINIC | Age: 55
End: 2025-06-17
Payer: OTHER GOVERNMENT

## 2025-06-19 ENCOUNTER — TELEPHONE (OUTPATIENT)
Dept: ONCOLOGY | Facility: CLINIC | Age: 55
End: 2025-06-19

## 2025-06-19 NOTE — TELEPHONE ENCOUNTER
Caller: Gissel Amaro    Relationship: Self    Best call back number:   Telephone Information:   Mobile [phone]     What is the best time to reach you: ANYTIME       What was the call regarding: PT WAS CALLING TO R/S 6/16 APPT THAT SHE MISSED WHILE HOSPITALIZED ALSO ADV SHE HAD MULT LABS DONE AND WANTED TO KNOW IF ANY OF THOSE COULD BE USED INSTEAD OF COMING IN FOR THE LAB PRIOR. PLEASE CB TO ADVISE.

## 2025-06-24 NOTE — PROGRESS NOTES
"  HEMATOLOGY ONCOLOGY OUTPATIENT FOLLOW UP       Patient name: Gissel Amaro  : 1970  MRN: [id number]  Primary Care Physician: Sony Wong MD  Referring Physician: No ref. provider found  Reason For Consult: thrombocytopenia    History of Present Illness:  Patient is a 54 y.o. male with CAD, hypertension, hyperlipidemia, asthma, anxiety/depression, diabetes with history of gastroparesis, fibromyalgia, GERD, irritable bowel syndrome, AMOS (on CPAP), CKD3, also with history of pancreatitis (Trulicity), and gastric sleeve in  who presented for intermittent mild thrombocytopenia.    -2024 admitted for CVA (left hemiplegia): got it all back, was at [facility]; got tPA, put on ASA/plavix  -6/10/2024 ([facility] consultants labs) CBC showed WBC 6.1 (normal differential), hemoglobin 13.9/hematocrit 42.6 with normal indices, plts 136K (normal for that lab 140-400), vitamin B12 746 pg/mL, TSH 3.09 free T3 =3.1 Free T4 =1.1 (thyroid test within normal limits) vitamin D 42 (optimal) hemoglobin A1c 6.0    -2024 Dr Dougherty repeat EGD showed: empiric dilation, and a single 25 mm nonbleeding gastric polyp piece-mealed out    -2024 CBC (per GI lab results) showed WBC 8.3 (normal differential), hemoglobin 13.6/hematocrit 41.7 with normal indices and platelets of 147K  -2024 patient was admitted to [facility] for GI bleed and abdominal pain suspected from ischemic colitis, bowel purge was given recommend patient have outpatient colonoscopy.  -2024 CT stone protocol showed normal liver, \"normal\" spleen, no mesenteric or retroperitoneal lymphadenopathy, no renal stones or hydronephrosis, no acute findings.    -2024 follows up with gastroenterology [facility] and has had repeat CT abdomen pelvis is (mild splenomegaly since at least 2019), and EGDs from Dr. Neff, Dr. Ho, Dr. Whiting in, and Dr. Stewart which is shown gastric polyps in , , 2023, " gastric biopsies negative for H. pylori, empiric dilations    -11/11/2024 Patient presents for initial consultation reporting bleeding/bruising. She denies fatigue, unintentional weight loss, fevers, chills, drenching night sweats, lymphadenopathy.  -had a gastric sleeve 2022 - lost 60 pounds, now stable the last 8 mo. Recently put on QULIPTA for headaches by Neurologist 10/2024.   No longer on plavix from the CVA, just ASA (but off for the last month 2/2 carpal tunnel and trigger finger surgery.    -11/12/2024 CBC showed WBC 7.57 (normal differential) with hemoglobin 13.8/hematocrit 43.1 with normal indices and platelets of 152. Tablets with peripheral blood smear showing unremarkable peripheral blood smear with no blasts identified    -12/9/2024 ultrasound of the abdomen showed: Liver measures up to 18 cm in size and is increased in echogenicity. No focal lesion or intraparotid biliary ductal dilation, s/p cecily. Spleen is mildly enlarged measuring 14.0 x 5.9 x 14.2 cm. Spleen volume 620 mL. Spleen demonstrates no acute abnormality on limited imaging. No evidence of ascites.   IMPRESSION: Increased echogenicity liver compatible diffuse hepatocellular disease, hepatic steatosis. Please correlate with liver function tests. Mild splenomegaly    -12/16/2024 Patient presents for follow up reporting bleeding/bruising-no bleeding or bruising. She continues to deny fatigue, unintentional weight loss, fevers, chills, drenching night sweats, lymphadenopathy.    Subjective:    6/25/2025: Patient presents today for routine follow-up.  She reports overall feeling well.  She was recently hospitalized and underwent appendectomy.  She states she is recovering but still has fatigue.  She had no bleeding complications with surgery.  She has upcoming appointment with her surgeon.  Otherwise she has no new complaints.  She has not had any bleeding or bruising concerns.    Past Medical History:   Diagnosis Date    Anxiety     Asthma      CAD (coronary artery disease)     Chronic pain disorder 2016    CTS (carpal tunnel syndrome)     Deep vein thrombosis 3/2012    Depression     Diabetes     MAX (dyspnea on exertion)     Fibromyalgia     GERD (gastroesophageal reflux disease)     Headache, tension-type     HL (hearing loss)     HTN (hypertension)     Hyperlipemia     IBS (irritable bowel syndrome)     Memory loss     Migraine     Neuropathy in diabetes     Panic disorder 2015    Plantar fasciitis     Sleep apnea     cpap    TIA (transient ischemic attack)        Past Surgical History:   Procedure Laterality Date    BREAST SURGERY Bilateral     2015, 2020    CARDIAC CATHETERIZATION      COLONOSCOPY  2017    COLONOSCOPY W/ BIOPSIES AND POLYPECTOMY  06/15/2022    5 polyps removed    ENDOSCOPY  2021    GALLBLADDER SURGERY  2010    GASTRIC SLEEVE LAPAROSCOPIC N/A 10/19/2022    Procedure: GASTRIC SLEEVE LAPAROSCOPIC WITH [employer] ROBOT;  Surgeon: Gloria Marinelli MD;  Location: [facility] MAIN OR;  Service: Robotics - [employer];  Laterality: N/A;    HYSTERECTOMY  2013    KNEE SURGERY Right 2017    LAPAROSCOPIC TUBAL LIGATION  2000    TONSILLECTOMY  1976         Current Outpatient Medications:     albuterol sulfate  (90 Base) MCG/ACT inhaler, Inhale 2 puffs Every 4 (Four) Hours As Needed., Disp: , Rfl:     Atogepant (Qulipta) 60 MG tablet, Take 1 tablet by mouth Daily., Disp: 30 tablet, Rfl: 11    B-D UF III MINI PEN NEEDLES 31G X 5 MM misc, USE AS DIRECTED TWICE DAILY FOR INSULIN INJECTIONS, Disp: , Rfl:     busPIRone (BUSPAR) 15 MG tablet, Take 1 tablet by mouth As Needed (anxiety)., Disp: , Rfl:     Cariprazine HCl (VRAYLAR) 1.5 MG capsule capsule, Take 1 capsule by mouth Every Night., Disp: , Rfl:     Continuous Blood Gluc Sensor (Dexcom G6 Sensor), Every 10 (Ten) Days., Disp: , Rfl:     Continuous Blood Gluc Sensor (FreeStyle Sushma 14 Day Sensor) misc, , Disp: , Rfl:     Dapagliflozin Propanediol (Farxiga) 10 MG tablet, Take 10 mg by mouth Every Night.,  Disp: , Rfl:     doxepin (SINEquan) 25 MG capsule, Take 1 capsule by mouth Every Night., Disp: , Rfl:     Evolocumab (REPATHA) solution auto-injector SureClick injection, Inject 1 mL under the skin into the appropriate area as directed Every 14 (Fourteen) Days., Disp: 1 mL, Rfl: 3    fenofibrate (TRICOR) 145 MG tablet, Take 1 tablet by mouth Daily., Disp: , Rfl:     Fluticasone-Umeclidin-Vilant (Trelegy Ellipta) 100-62.5-25 MCG/INH inhaler, Trelegy Ellipta 100 mcg-62.5 mcg-25 mcg powder for inhalation  INHALE 1 PUFF BY MOUTH EVERY DAY, Disp: , Rfl:     gabapentin (NEURONTIN) 600 MG tablet, Take 1 tablet by mouth 3 (Three) Times a Day As Needed., Disp: , Rfl:     glucose blood test strip, Accu-Chek Guide test strips, Disp: , Rfl:     HYDROcodone-acetaminophen (NORCO)  MG per tablet, Take 1 tablet by mouth Every 8 (Eight) Hours As Needed (chronic back pain)., Disp: , Rfl:     Insulin Pen Needle (GNP UltiCare Pen Needles) 32G X 4 MM misc, 32g 5/32, Disp: , Rfl:     Insulin Regular Human (HUMULIN R U-500 [facility]), 150 Units. 75 am and  75 pm, Disp: , Rfl:     losartan (COZAAR) 25 MG tablet, Take 4 tablets by mouth Daily., Disp: , Rfl:     meclizine (ANTIVERT) 25 MG tablet, Take 1 tablet by mouth 3 (Three) Times a Day., Disp: , Rfl:     nystatin (MYCOSTATIN) 981952 UNIT/GM powder, APPLY TO THE AFFECTED AREA(S) BY TOPICAL ROUTE 2 TIMES PER DAY, Disp: , Rfl:     ondansetron ODT (ZOFRAN-ODT) 8 MG disintegrating tablet, DISSOLVE 1 TABLET IN MOUTH EVERY EIGHT HOURS AS NEEDED FOR NAUSEA AND VOMITING, Disp: 30 tablet, Rfl: 0    pantoprazole (PROTONIX) 40 MG EC tablet, Take 1 tablet by mouth twice a day for 30 days, Disp: , Rfl:     propranolol (INDERAL) 80 MG tablet, Take 1 tablet by mouth Daily., Disp: , Rfl:     Rimegepant Sulfate (Nurtec) 75 MG tablet dispersible tablet, Take 1 tablet by mouth As Needed (migraine)., Disp: 8 tablet, Rfl: 5    rOPINIRole (REQUIP) 2 MG tablet, Take 1 tablet by mouth Every Night., Disp:  , Rfl:     sertraline (ZOLOFT) 100 MG tablet, Take 1 tablet by mouth 2 (Two) Times a Day., Disp: , Rfl:     Tirzepatide (Mounjaro) 10 MG/0.5ML solution pen-injector pen, Inject 10 mg every week by subcutaneous route., Disp: , Rfl:     TiZANidine (ZANAFLEX) 4 MG capsule, Take 1 capsule by mouth 3 (Three) Times a Day As Needed for Muscle Spasms., Disp: , Rfl:     vitamin D (ERGOCALCIFEROL) 1.25 MG (69379 UT) capsule capsule, Take 1 capsule by mouth 1 (One) Time Per Week. Wednesdays, Disp: , Rfl:     Allergies   Allergen Reactions    Trulicity [Dulaglutide] Other (See Comments)     Caused pancreatitis    Oxycodone Itching    Penicillins Rash and Unknown - High Severity     Childhood Rx       Family History   Problem Relation Age of Onset    Cancer Mother         Breast    Migraines Mother     Asthma Mother     Depression Mother     COPD Father 64    Heart disease Father     Obesity Maternal Grandmother     Dementia Maternal Grandfather     Dementia Maternal Aunt     Stroke Maternal Aunt        Cancer-related family history includes Cancer in her mother.    Social History     Tobacco Use    Smoking status: Never    Smokeless tobacco: Never   Vaping Use    Vaping status: Never Used   Substance Use Topics    Alcohol use: Never    Drug use: Never     Social History     Social History Narrative    Not on file      Review of Systems:  Review of Systems   Constitutional:  Positive for fatigue. Negative for chills and fever.   HENT:  Negative for ear pain, mouth sores, nosebleeds and sore throat.    Eyes:  Negative for photophobia and visual disturbance.   Respiratory:  Negative for wheezing and stridor.    Cardiovascular:  Negative for chest pain and palpitations.   Gastrointestinal:  Negative for abdominal pain, diarrhea, nausea and vomiting.        She still has some residual abdominal discomfort following her appendectomy but states that continues to improve   Endocrine: Negative for cold intolerance and heat intolerance.  "  Genitourinary:  Negative for dysuria and hematuria.   Musculoskeletal:  Negative for joint swelling and neck stiffness.   Skin:  Negative for color change, pallor, rash and wound.   Neurological:  Negative for seizures and syncope.   Hematological:  Negative for adenopathy. Does not bruise/bleed easily.        No obvious bleeding   Psychiatric/Behavioral:  Negative for agitation, confusion and hallucinations.          Objective:  Vital signs:  Vitals:    06/25/25 0914   BP: 120/80   Pulse: 57   Temp: 97.5 °F (36.4 °C)   TempSrc: Temporal   SpO2: 98%   Weight: 105 kg (230 lb 6.4 oz)   Height: 157.5 cm (62\")   PainSc: 0-No pain       Body mass index is 42.14 kg/m².      Physical Exam:   (0) Fully active, able to carry on all predisease performance without restriction    Physical Exam  Vitals reviewed.   Constitutional:       General: She is not in acute distress.     Appearance: Normal appearance. She is not diaphoretic.   HENT:      Head: Normocephalic and atraumatic.   Eyes:      General: No scleral icterus.        Right eye: No discharge.         Left eye: No discharge.      Conjunctiva/sclera: Conjunctivae normal.   Neck:      Thyroid: No thyromegaly.   Cardiovascular:      Rate and Rhythm: Normal rate and regular rhythm.      Pulses: Normal pulses.      Heart sounds: Normal heart sounds. No murmur heard.     No friction rub. No gallop.   Pulmonary:      Effort: Pulmonary effort is normal. No respiratory distress.      Breath sounds: Normal breath sounds. No stridor. No wheezing.   Abdominal:      Palpations: Abdomen is soft.   Musculoskeletal:         General: No swelling or tenderness. Normal range of motion.      Cervical back: Normal range of motion and neck supple.   Lymphadenopathy:      Cervical: No cervical adenopathy.   Skin:     General: Skin is warm.      Coloration: Skin is not jaundiced or pale.      Findings: No erythema or rash.   Neurological:      General: No focal deficit present.      Mental " "Status: She is alert and oriented to person, place, and time.      Motor: No abnormal muscle tone.   Psychiatric:         Mood and Affect: Mood normal.         Behavior: Behavior normal.         Thought Content: Thought content normal.         Judgment: Judgment normal.             Lab Results - Last 18 Months   Lab Units 06/25/25  0916 01/21/25  2250 11/12/24  1611   WBC 10*3/mm3 4.92 7.35 7.57   HEMOGLOBIN g/dL 13.0 13.2 13.8   HEMATOCRIT % 41.5 42.1 43.1   PLATELETS 10*3/mm3 173 155 152   MCV fL 93.0 92.1 90.4       Lab Results   Component Value Date    GLUCOSE 175 (H) 01/21/2025    BUN 28 (H) 01/21/2025    CREATININE 2.25 (H) 01/21/2025    BCR 12.4 01/21/2025    K 4.4 01/21/2025    CO2 26.2 01/21/2025    CALCIUM 9.8 01/21/2025    ALBUMIN 4.3 01/21/2025    AST 14 01/21/2025    ALT 15 01/21/2025       Lab Results - Last 18 Months   Lab Units 05/15/24  1143   INR  0.91   APTT Second 32.5       Lab Results   Component Value Date    IRON 73 12/26/2022       Lab Results   Component Value Date    PTT 32.5 05/15/2024    INR 0.91 05/15/2024     No results found for: \"SEDRATE\"    Assessment & Plan   54-year-old white female with history of diabetes complicated by gastroparesis, hypertension, hyperlipidemia, obesity status post gastric sleeve with GI bleed (ischemic colitis 8/2024) presents for chronic intermittent mild thrombocytopenia.    1. chronic intermittent mild thrombocytopenia: noted in charts back to at least 5/2017. Initial differential diagnosis production issues (decreased production of bone marrow, if liver damage causing decreased TPO production, may be a lack of TPO), consumption (due to a slow bleed or chronic infection, myelodysplasia), or sequestration (splenomegaly), pseudothrombocytopenia.    -Unlikely to be drug induced thrombocytopenia or acute thrombocytopenic events such as TTP/HUS/ITP as those thrombocytopenias are abrupt and severe, usually below 20K.    Med that can cause thrombocytopenia that " she has been on: antiplatelet medications (was on plavix for 30 days), over-the-counter medications (acetaminophen, -on [employer] for years).    Previous CBC differential was within normal limits, peripheral smear negative for platelet clumping   -ultrasound of abdomen for evaluation showed enlarged liver with hepatosteatosis and enlarged spleen.    CBC today within normal limits.  She has not had any bleeding concerns.    Patient will return in about 6 months with repeat blood counts.  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  Sooner follow-up if condition indicates      Thank you very much for providing the opportunity to participate in this patient’s care. Please do not hesitate to call if there are any other questions.    Time spent on encounter including record review, history taking, exam, discussion, counseling and documentation at: 30 minutes

## 2025-06-25 ENCOUNTER — OFFICE VISIT (OUTPATIENT)
Dept: ONCOLOGY | Facility: CLINIC | Age: 55
End: 2025-06-25
Payer: OTHER GOVERNMENT

## 2025-06-25 ENCOUNTER — LAB (OUTPATIENT)
Dept: LAB | Facility: HOSPITAL | Age: 55
End: 2025-06-25
Payer: OTHER GOVERNMENT

## 2025-06-25 VITALS
WEIGHT: 230.4 LBS | TEMPERATURE: 97.5 F | SYSTOLIC BLOOD PRESSURE: 120 MMHG | OXYGEN SATURATION: 98 % | DIASTOLIC BLOOD PRESSURE: 80 MMHG | HEIGHT: 62 IN | BODY MASS INDEX: 42.4 KG/M2 | HEART RATE: 57 BPM

## 2025-06-25 DIAGNOSIS — D69.6 THROMBOCYTOPENIA: Primary | ICD-10-CM

## 2025-06-25 LAB
BASOPHILS # BLD AUTO: 0.02 10*3/MM3 (ref 0–0.2)
BASOPHILS NFR BLD AUTO: 0.4 % (ref 0–1.5)
DEPRECATED RDW RBC AUTO: 44 FL (ref 37–54)
EOSINOPHIL # BLD AUTO: 0.15 10*3/MM3 (ref 0–0.4)
EOSINOPHIL NFR BLD AUTO: 3 % (ref 0.3–6.2)
ERYTHROCYTE [DISTWIDTH] IN BLOOD BY AUTOMATED COUNT: 13.4 % (ref 12.3–15.4)
HCT VFR BLD AUTO: 41.5 % (ref 34–46.6)
HGB BLD-MCNC: 13 G/DL (ref 12–15.9)
HOLD SPECIMEN: NORMAL
LYMPHOCYTES # BLD AUTO: 2.22 10*3/MM3 (ref 0.7–3.1)
LYMPHOCYTES NFR BLD AUTO: 45.1 % (ref 19.6–45.3)
MCH RBC QN AUTO: 29.1 PG (ref 26.6–33)
MCHC RBC AUTO-ENTMCNC: 31.3 G/DL (ref 31.5–35.7)
MCV RBC AUTO: 93 FL (ref 79–97)
MONOCYTES # BLD AUTO: 0.38 10*3/MM3 (ref 0.1–0.9)
MONOCYTES NFR BLD AUTO: 7.7 % (ref 5–12)
NEUTROPHILS NFR BLD AUTO: 2.15 10*3/MM3 (ref 1.7–7)
NEUTROPHILS NFR BLD AUTO: 43.8 % (ref 42.7–76)
PLATELET # BLD AUTO: 173 10*3/MM3 (ref 140–450)
PMV BLD AUTO: 9.3 FL (ref 6–12)
RBC # BLD AUTO: 4.46 10*6/MM3 (ref 3.77–5.28)
WBC NRBC COR # BLD AUTO: 4.92 10*3/MM3 (ref 3.4–10.8)

## 2025-06-25 PROCEDURE — 99214 OFFICE O/P EST MOD 30 MIN: CPT | Performed by: PHYSICIAN ASSISTANT

## 2025-06-25 PROCEDURE — 85025 COMPLETE CBC W/AUTO DIFF WBC: CPT

## 2025-06-25 PROCEDURE — 36415 COLL VENOUS BLD VENIPUNCTURE: CPT

## 2025-07-28 ENCOUNTER — ON CAMPUS - OUTPATIENT (AMBULATORY)
Dept: URBAN - METROPOLITAN AREA HOSPITAL 77 | Facility: HOSPITAL | Age: 55
End: 2025-07-28
Payer: OTHER GOVERNMENT

## 2025-07-28 DIAGNOSIS — K29.50 UNSPECIFIED CHRONIC GASTRITIS WITHOUT BLEEDING: ICD-10-CM

## 2025-07-28 DIAGNOSIS — K31.89 OTHER DISEASES OF STOMACH AND DUODENUM: ICD-10-CM

## 2025-07-28 DIAGNOSIS — K59.1 FUNCTIONAL DIARRHEA: ICD-10-CM

## 2025-07-28 DIAGNOSIS — K31.7 POLYP OF STOMACH AND DUODENUM: ICD-10-CM

## 2025-07-28 DIAGNOSIS — Z98.84 BARIATRIC SURGERY STATUS: ICD-10-CM

## 2025-07-28 DIAGNOSIS — R13.10 DYSPHAGIA, UNSPECIFIED: ICD-10-CM

## 2025-07-28 PROCEDURE — 43239 EGD BIOPSY SINGLE/MULTIPLE: CPT | Performed by: INTERNAL MEDICINE

## 2025-07-28 PROCEDURE — 43450 DILATE ESOPHAGUS 1/MULT PASS: CPT | Performed by: INTERNAL MEDICINE

## 2025-07-28 PROCEDURE — 45380 COLONOSCOPY AND BIOPSY: CPT | Performed by: INTERNAL MEDICINE

## 2025-07-29 ENCOUNTER — OFFICE VISIT (OUTPATIENT)
Dept: CARDIOLOGY | Facility: CLINIC | Age: 55
End: 2025-07-29
Payer: OTHER GOVERNMENT

## 2025-07-29 VITALS
SYSTOLIC BLOOD PRESSURE: 126 MMHG | HEART RATE: 64 BPM | WEIGHT: 228 LBS | HEIGHT: 62 IN | OXYGEN SATURATION: 98 % | BODY MASS INDEX: 41.96 KG/M2 | DIASTOLIC BLOOD PRESSURE: 84 MMHG

## 2025-07-29 DIAGNOSIS — I63.9 CRYPTOGENIC STROKE: ICD-10-CM

## 2025-07-29 DIAGNOSIS — I47.10 PAROXYSMAL SVT (SUPRAVENTRICULAR TACHYCARDIA): Primary | ICD-10-CM

## 2025-07-29 NOTE — PROGRESS NOTES
[facility] CARDIOLOGY      REASON FOR FOLLOW-UP:  Coronary artery disease  Hypertension  Hypertensive cardiovascular disease          Chief Complaint   Patient presents with    Heart Problem         Dear Sony Wong MD        Heart Problem     It was my pleasure to see Gissel in the office today.  She is a 54-year-old female with known history of coronary artery disease per heart catheterization performed 12/12/2018 in which she was found to have small vessel disease in the distal LAD with elevated LVEDP.  Left ventricular ejection fraction low normal at 50%.  She has additional history that includes diabetes mellitus 2, hypertension with hypertensive cardiovascular disease, dyslipidemia, history of lower extremity DVT on anticoagulation prior, history of heart failure with preserved ejection fraction.  2D echo performed 9/9/2020 with normal LV systolic function with EF 60-65%.  Trace TR/AR. Normal pulmonary artery pressure.  Gissel was hospitalized at [facility] 4/2/2024 for symptoms of chest pain. Records were reviewed-she ruled out for acute coronary syndrome and underwent nuclear stress testing that showed no evidence of ischemia. 2D echocardiogram was also performed with normal LV function and wall motion with no valvular pathology reported. Due to her ongoing symptoms and concern for underlying disease as well as false negative stress testing, left heart catheterization was performed that showed normal coronaries.       Gissel was admitted May 15, 2024 for stroke symptoms. She reported onset while at work with speech difficulty followed by left upper and lower extremity weakness. She was transferred to [facility]. CT/CTA head and neck were negative and patient was treated with thrombolytic therapy-tPA.  Plavix and aspirin were added to her medication regimen. JOANNE at [facility]: EF 55-60%, grade 1 DD, normal RV structure and function, no evidence of thrombus  within the LA or LEDY, bubble study with no shunting noted across the intra-atrial septum, mild aortic plaquing.    30-day mobile cardiac telemetry was completed with baseline normal sinus rhythm, maximum heart rate 111 bpm/minimum 55 bpm.  Rare PACs, no PVCs.  No sustained atrial or ventricular arrhythmias.  1 brief 3 beat atrial run with rate 158 bpm.  Gissel presents today in follow-up for the above cardiac conditions.    Gissel underwent appendectomy on 6/16/2025 and stated she is doing well postoperative    She reports today occasional palpitations and associated elevated blood pressure.  When she experiences the palpitations and checks her blood pressure, it is routinely elevated and heart rates is also elevated as high as 110 that she has noted.  She denies any dizziness, lightheadedness, near syncopal or syncopal episodes.  She denies any chest pain, pressure or tightness, no shortness of breath or lower extremity edema.      ASSESSMENT:  Cryptogenic stroke with left hemiparesis treated with thrombolytic therapy  Coronary artery disease- small vessel.  No evidence of coronary disease on heart cath April 2024  History of heart failure with preserved ejection fraction  Hypertension with hypertensive cardiovascular disease  Dyslipidemia  Chronic kidney disease  History of lower extremity DVT  Morbid obesity with BMI 51.76 status post gastric sleeve surgery 10/19/2022  Trivial valvular heart disease    PLAN:  No evidence of atrial fibrillation noted on 30-day monitor.  She did have very short run of atrial tachycardia lasting only 3 beats.  At this point, I would recommend proceeding with loop recorder implant.  Procedure and indications were reviewed with the patient, she wishes to proceed.  Continue current CV plan of care to include Repatha, fenofibrate, losartan, propranolol.  Risk factor modification including heart healthy diet, routine activity as tolerated, healthy weight.  Surveillance labs per primary  care.  Follow-up after procedure.        CHF Guideline Directed Medical Therapy  Beta Blocker:   ARNI/ACE/ARB:   SGLT 2 inhibitors:   Diuretics:   Aldosterone Antagonist:   Vasodilators & Nitrates:       Diagnoses and all orders for this visit:    1. Cryptogenic stroke (Primary)  -     Loop Recorder Implant; Future    2. Paroxysmal SVT (supraventricular tachycardia)    Other orders  -     ECG 12 Lead          The following portions of the patient's history were reviewed and updated as appropriate: allergies, current medications, past family history, past medical history, past social history, past surgical history, and problem list.    REVIEW OF SYSTEMS:    Review of Systems   Cardiovascular:  Positive for palpitations.   All other systems reviewed and are negative.      Vitals:    07/29/25 0816   BP: 126/84   Pulse: 64   SpO2: 98%         PHYSICAL EXAM:    General: Alert, cooperative, no distress, appears stated age  Head:  Normocephalic, atraumatic, mucous membranes moist  Eyes:  Conjunctiva/corneas clear, EOM's intact     Neck:  Supple,  no JVD or bruit     Lungs: Clear to auscultation bilaterally, no wheezes rhonchi rales are noted  Chest wall: No tenderness  Musculoskeletal:   Ambulates freely without assistance  Heart::  Regular rate and rhythm, S1 and S2 normal, no murmur, rub or gallop  Abdomen: Soft, non-tender, nondistended, bowel sounds active, no abdominal bruit  Extremities: No cyanosis, clubbing, or edema   Pulses: 2+ and symmetric all extremities  Skin:  No rashes or lesions  Neuro/psych: A&O x3. CN II through XII are grossly intact with appropriate affect        Past Medical History:   Diagnosis Date    Anxiety     Asthma     CAD (coronary artery disease)     Chronic pain disorder 2016    CTS (carpal tunnel syndrome)     Deep vein thrombosis 3/2012    Depression     Diabetes     MAX (dyspnea on exertion)     Fibromyalgia     GERD (gastroesophageal reflux disease)     Headache, tension-type     HL  (hearing loss)     HTN (hypertension)     Hyperlipemia     IBS (irritable bowel syndrome)     Memory loss     Migraine     Neuropathy in diabetes     Panic disorder 2015    Plantar fasciitis     Sleep apnea     cpap    TIA (transient ischemic attack)        Past Surgical History:   Procedure Laterality Date    BREAST SURGERY Bilateral     2015, 2020    CARDIAC CATHETERIZATION      COLONOSCOPY  2017    COLONOSCOPY W/ BIOPSIES AND POLYPECTOMY  06/15/2022    5 polyps removed    ENDOSCOPY  2021    GALLBLADDER SURGERY  2010    GASTRIC SLEEVE LAPAROSCOPIC N/A 10/19/2022    Procedure: GASTRIC SLEEVE LAPAROSCOPIC WITH MaxPrepsINCI ROBOT;  Surgeon: Gloria Marinelli MD;  Location: [facility] MAIN OR;  Service: Robotics - DaVinci;  Laterality: N/A;    HYSTERECTOMY  2013    KNEE SURGERY Right 2017    LAPAROSCOPIC TUBAL LIGATION  2000    TONSILLECTOMY  1976         Current Outpatient Medications:     albuterol sulfate  (90 Base) MCG/ACT inhaler, Inhale 2 puffs Every 4 (Four) Hours As Needed., Disp: , Rfl:     Atogepant (Qulipta) 60 MG tablet, Take 1 tablet by mouth Daily., Disp: 30 tablet, Rfl: 11    B-D UF III MINI PEN NEEDLES 31G X 5 MM misc, USE AS DIRECTED TWICE DAILY FOR INSULIN INJECTIONS, Disp: , Rfl:     busPIRone (BUSPAR) 15 MG tablet, Take 1 tablet by mouth As Needed (anxiety)., Disp: , Rfl:     Cariprazine HCl (VRAYLAR) 1.5 MG capsule capsule, Take 1 capsule by mouth Every Night., Disp: , Rfl:     Continuous Blood Gluc Sensor (Dexcom G6 Sensor), Every 10 (Ten) Days., Disp: , Rfl:     Continuous Blood Gluc Sensor (FreeStyle Sushma 14 Day Sensor) misc, , Disp: , Rfl:     Dapagliflozin Propanediol (Farxiga) 10 MG tablet, Take 10 mg by mouth Every Night., Disp: , Rfl:     doxepin (SINEquan) 25 MG capsule, Take 1 capsule by mouth Every Night., Disp: , Rfl:     Evolocumab (REPATHA) solution auto-injector SureClick injection, Inject 1 mL under the skin into the appropriate area as directed Every 14 (Fourteen) Days., Disp: 1 mL,  Rfl: 3    fenofibrate (TRICOR) 145 MG tablet, Take 1 tablet by mouth Daily., Disp: , Rfl:     Fluticasone-Umeclidin-Vilant (Trelegy Ellipta) 100-62.5-25 MCG/INH inhaler, Trelegy Ellipta 100 mcg-62.5 mcg-25 mcg powder for inhalation  INHALE 1 PUFF BY MOUTH EVERY DAY, Disp: , Rfl:     gabapentin (NEURONTIN) 600 MG tablet, Take 1 tablet by mouth 3 (Three) Times a Day As Needed., Disp: , Rfl:     glucose blood test strip, Accu-Chek Guide test strips, Disp: , Rfl:     HYDROcodone-acetaminophen (NORCO)  MG per tablet, Take 1 tablet by mouth Every 8 (Eight) Hours As Needed (chronic back pain)., Disp: , Rfl:     Insulin Pen Needle (GNP UltiCare Pen Needles) 32G X 4 MM misc, 32g 5/32, Disp: , Rfl:     Insulin Regular Human (HUMULIN R U-500 KWIKPEN SC), 150 Units. 75 am and  75 pm, Disp: , Rfl:     losartan (COZAAR) 25 MG tablet, Take 4 tablets by mouth Daily., Disp: , Rfl:     meclizine (ANTIVERT) 25 MG tablet, Take 1 tablet by mouth 3 (Three) Times a Day., Disp: , Rfl:     nystatin (MYCOSTATIN) 231741 UNIT/GM powder, APPLY TO THE AFFECTED AREA(S) BY TOPICAL ROUTE 2 TIMES PER DAY, Disp: , Rfl:     ondansetron ODT (ZOFRAN-ODT) 8 MG disintegrating tablet, DISSOLVE 1 TABLET IN MOUTH EVERY EIGHT HOURS AS NEEDED FOR NAUSEA AND VOMITING, Disp: 30 tablet, Rfl: 0    pantoprazole (PROTONIX) 40 MG EC tablet, Take 1 tablet by mouth twice a day for 30 days, Disp: , Rfl:     propranolol (INDERAL) 80 MG tablet, Take 1 tablet by mouth Daily., Disp: , Rfl:     Rimegepant Sulfate (Nurtec) 75 MG tablet dispersible tablet, Take 1 tablet by mouth As Needed (migraine)., Disp: 8 tablet, Rfl: 5    rOPINIRole (REQUIP) 2 MG tablet, Take 1 tablet by mouth Every Night., Disp: , Rfl:     sertraline (ZOLOFT) 100 MG tablet, Take 1 tablet by mouth 2 (Two) Times a Day., Disp: , Rfl:     Tirzepatide (Mounjaro) 10 MG/0.5ML solution pen-injector pen, Inject 10 mg every week by subcutaneous route., Disp: , Rfl:     TiZANidine (ZANAFLEX) 4 MG capsule,  "Take 1 capsule by mouth 3 (Three) Times a Day As Needed for Muscle Spasms., Disp: , Rfl:     vitamin D (ERGOCALCIFEROL) 1.25 MG (50081 UT) capsule capsule, Take 1 capsule by mouth 1 (One) Time Per Week. Wednesdays, Disp: , Rfl:     Allergies   Allergen Reactions    Trulicity [Dulaglutide] Other (See Comments)     Caused pancreatitis    Oxycodone Itching    Penicillins Rash and Unknown - High Severity     Childhood Rx       Family History   Problem Relation Age of Onset    Cancer Mother         Breast    Migraines Mother     Asthma Mother     Depression Mother     COPD Father 64    Heart disease Father     Obesity Maternal Grandmother     Dementia Maternal Grandfather     Dementia Maternal Aunt     Stroke Maternal Aunt        Social History     Tobacco Use    Smoking status: Never    Smokeless tobacco: Never   Substance Use Topics    Alcohol use: Never           Current Electrocardiogram:    ECG 12 Lead    Date/Time: 7/29/2025 10:03 AM  Performed by: Fidelina Cronin APRN    Authorized by: Fidelina Cronin APRN  Comparison: compared with previous ECG from 1/28/2025  Similar to previous ECG  Rhythm: sinus rhythm  BPM: 64              EMR Dragon/Transcription:   \"Dictated utilizing Dragon dictation\".     Copied text in this note has been reviewed by me and is accurate as of 07/29/25.    "

## 2025-08-15 RX ORDER — VIT C/E/ZN/COPPR/LUTEIN/ZEAXAN 60 MG-6 MG
1 CAPSULE ORAL DAILY
COMMUNITY

## 2025-08-18 ENCOUNTER — HOSPITAL ENCOUNTER (OUTPATIENT)
Dept: CARDIOLOGY | Facility: HOSPITAL | Age: 55
Setting detail: HOSPITAL OUTPATIENT SURGERY
Discharge: HOME | End: 2025-08-18
Admitting: INTERNAL MEDICINE
Payer: OTHER GOVERNMENT

## 2025-08-18 VITALS
TEMPERATURE: 98.7 F | HEIGHT: 62 IN | WEIGHT: 230.16 LBS | OXYGEN SATURATION: 94 % | RESPIRATION RATE: 16 BRPM | SYSTOLIC BLOOD PRESSURE: 113 MMHG | DIASTOLIC BLOOD PRESSURE: 70 MMHG | HEART RATE: 79 BPM | BODY MASS INDEX: 42.35 KG/M2

## 2025-08-18 DIAGNOSIS — I63.9 CRYPTOGENIC STROKE: ICD-10-CM

## 2025-08-18 PROCEDURE — 25010000002 LIDOCAINE-EPINEPHRINE 2 %-1:100000 SOLUTION: Performed by: INTERNAL MEDICINE

## 2025-08-18 PROCEDURE — 33285 INSJ SUBQ CAR RHYTHM MNTR: CPT

## 2025-08-18 PROCEDURE — C1764 EVENT RECORDER, CARDIAC: HCPCS

## 2025-08-18 RX ORDER — LIDOCAINE HYDROCHLORIDE AND EPINEPHRINE BITARTRATE 20; .01 MG/ML; MG/ML
INJECTION, SOLUTION SUBCUTANEOUS
Status: COMPLETED | OUTPATIENT
Start: 2025-08-18 | End: 2025-08-18

## 2025-08-18 RX ADMIN — LIDOCAINE HYDROCHLORIDE AND EPINEPHRINE 20 ML: 20; 10 INJECTION, SOLUTION INFILTRATION; PERINEURAL at 09:33

## 2025-08-26 ENCOUNTER — OFFICE VISIT (OUTPATIENT)
Dept: CARDIOLOGY | Facility: CLINIC | Age: 55
End: 2025-08-26
Payer: OTHER GOVERNMENT

## 2025-08-26 ENCOUNTER — CLINICAL SUPPORT NO REQUIREMENTS (OUTPATIENT)
Dept: CARDIOLOGY | Facility: CLINIC | Age: 55
End: 2025-08-26
Payer: OTHER GOVERNMENT

## 2025-08-26 DIAGNOSIS — I63.9 CRYPTOGENIC STROKE: Primary | ICD-10-CM

## 2025-08-27 LAB
MDC_IDC_MSMT_BATTERY_STATUS: NORMAL
MDC_IDC_PG_IMPLANT_DTM: NORMAL
MDC_IDC_PG_MFG: NORMAL
MDC_IDC_PG_MODEL: NORMAL
MDC_IDC_PG_SERIAL: NORMAL
MDC_IDC_PG_TYPE: NORMAL
MDC_IDC_SESS_DTM: NORMAL

## (undated) DEVICE — NEEDLE, QUINCKE, 20GX3.5": Brand: MEDLINE

## (undated) DEVICE — LAPAROSCOPIC GAS CONDITIONING DEVICE.: Brand: INSUFLOW

## (undated) DEVICE — TUBING, SUCTION, 1/4" X 12', STRAIGHT: Brand: MEDLINE

## (undated) DEVICE — LAPAROVUE VISIBILITY SYSTEM LAPAROSCOPIC SOLUTIONS: Brand: LAPAROVUE

## (undated) DEVICE — COLUMN DRAPE

## (undated) DEVICE — PASS SUT PRO BARIATRIC XL W/TROC SWABS

## (undated) DEVICE — SEAL

## (undated) DEVICE — SLV SCD CALF HEMOFORCE DVT THERP REPROC MD

## (undated) DEVICE — SYRINGE,TOOMEY,IRRIGATION,70CC,STERILE: Brand: MEDLINE

## (undated) DEVICE — VESSEL SEALER EXTEND: Brand: ENDOWRIST

## (undated) DEVICE — VISIGI 3D®  CALIBRATION SYSTEM  SIZE 40FR STD W/ BULB: Brand: BOEHRINGER® VISIGI 3D™ SLEEVE GASTRECTOMY CALIBRATION SYSTEM, SIZE 40FR W/BULB

## (undated) DEVICE — SYR LUERLOK 50ML

## (undated) DEVICE — GLV SURG SIGNATURE ESSENTIAL PF LTX SZ7.5

## (undated) DEVICE — 9165 UNIVERSAL PATIENT PLATE: Brand: 3M™

## (undated) DEVICE — BLADELESS OBTURATOR: Brand: WECK VISTA

## (undated) DEVICE — TP SXN YANKR BULB STRL

## (undated) DEVICE — KT SURG TURNOVER 050

## (undated) DEVICE — ARM DRAPE

## (undated) DEVICE — STAPLER 60: Brand: SUREFORM

## (undated) DEVICE — PK BARIATRIC 50

## (undated) DEVICE — GOWN,REINF,POLY,ECL,PP SLV,XL,XLONG: Brand: MEDLINE

## (undated) DEVICE — APL DUPLOSPRAYER MIS 40CM

## (undated) DEVICE — ENDOPATH XCEL WITH OPTIVIEW TECHNOLOGY BLADELESS TROCARS WITH STABILITY SLEEVES: Brand: ENDOPATH XCEL OPTIVIEW

## (undated) DEVICE — SOL IRRIG NACL 1000ML

## (undated) DEVICE — REDUCER: Brand: ENDOWRIST

## (undated) DEVICE — ORG INST STRIP/TS ADHS 2X10IN YEL STRL

## (undated) DEVICE — MAT PREVALON MOBL TRANSFR AIR W/PAD REPROC 39X81IN

## (undated) DEVICE — ABDOMINAL BINDER: Brand: DEROYAL

## (undated) DEVICE — CANNULA SEAL